# Patient Record
Sex: FEMALE | Race: WHITE | NOT HISPANIC OR LATINO | Employment: OTHER | URBAN - NONMETROPOLITAN AREA
[De-identification: names, ages, dates, MRNs, and addresses within clinical notes are randomized per-mention and may not be internally consistent; named-entity substitution may affect disease eponyms.]

---

## 2017-02-27 ENCOUNTER — OFFICE VISIT (OUTPATIENT)
Dept: PAIN MEDICINE | Facility: CLINIC | Age: 57
End: 2017-02-27

## 2017-02-27 VITALS
BODY MASS INDEX: 30.46 KG/M2 | WEIGHT: 171.9 LBS | HEIGHT: 63 IN | DIASTOLIC BLOOD PRESSURE: 92 MMHG | SYSTOLIC BLOOD PRESSURE: 162 MMHG

## 2017-02-27 DIAGNOSIS — M79.2 NEUROPATHIC PAIN: Primary | ICD-10-CM

## 2017-02-27 DIAGNOSIS — Z79.899 HIGH RISK MEDICATIONS (NOT ANTICOAGULANTS) LONG-TERM USE: ICD-10-CM

## 2017-02-27 PROCEDURE — 99213 OFFICE O/P EST LOW 20 MIN: CPT | Performed by: PAIN MEDICINE

## 2017-02-27 RX ORDER — BUPRENORPHINE 20 UG/H
1 PATCH TRANSDERMAL WEEKLY
Qty: 4 PATCH | Refills: 2 | Status: SHIPPED | OUTPATIENT
Start: 2017-02-27 | End: 2017-04-25 | Stop reason: SDUPTHER

## 2017-02-27 RX ORDER — PREGABALIN 150 MG/1
150 CAPSULE ORAL 2 TIMES DAILY
Qty: 60 CAPSULE | Refills: 2 | Status: SHIPPED | OUTPATIENT
Start: 2017-02-27 | End: 2017-05-23 | Stop reason: SDUPTHER

## 2017-02-27 NOTE — PROGRESS NOTES
Ga Mendoza is a 56 y.o. female.   1960    HPI:   Location: bilateral foot  Quality: aching and tingling  Severity: 5/10  Timing: constant  Alleviating: pain medication  Aggravating: increased activity     Missed an appt.  Has been wearing the patches longer than normal to stretch them out.   Pain is not as well controlled.      The following portions of the patient's history were reviewed by me and updated as appropriate: allergies, current medications, past family history, past medical history, past social history, past surgical history and problem list.    Past Medical History   Diagnosis Date   • Anxiety      Anxiety state      • Bipolar affective disorder, current episode depressed    • Bipolar disorder    • Drug therapy      Long-term drug therapy      • Essential hypertension    • Headache    • Hyperlipidemia    • Influenza    • Liver function test abnormality      Abnormal liver test   • Neuropathic pain    • Obesity    • Pain of breast      No evidence of cancer      • Urinary tract infectious disease        Social History     Social History   • Marital status:      Spouse name: N/A   • Number of children: N/A   • Years of education: N/A     Occupational History   • Not on file.     Social History Main Topics   • Smoking status: Current Every Day Smoker   • Smokeless tobacco: Not on file   • Alcohol use Yes      Comment: seldom   • Drug use: No   • Sexual activity: Defer     Other Topics Concern   • Not on file     Social History Narrative       Family History   Problem Relation Age of Onset   • Depression Other    • Diabetes Other    • Heart disease Other    • Hypertension Other    • Stroke Other    • Lung disease Other    • Other Other      Colon problems         Current Outpatient Prescriptions:   •  ACLIDINIUM BROMIDE IN, Inhale., Disp: , Rfl:   •  ALBUTEROL SULFATE HFA IN, Inhale., Disp: , Rfl:   •  Buprenorphine 20 MCG/HR patch weekly, Place 1 patch on the skin 1 (One) Time Per  Week., Disp: 4 patch, Rfl: 2  •  Calcium Citrate-Vitamin D (CALCIUM CITRATE + D PO), Take 1 tablet by mouth Daily., Disp: , Rfl:   •  clonazePAM (KlonoPIN) 1 MG tablet, Take 1 mg by mouth., Disp: , Rfl:   •  doxepin (SINEquan) 100 MG capsule, Take 100 mg by mouth., Disp: , Rfl:   •  Flaxseed, Linseed, (FLAXSEED OIL) 1000 MG capsule, Take  by mouth., Disp: , Rfl:   •  lamoTRIgine (LaMICtal) 200 MG tablet, Take 200 mg by mouth Daily., Disp: , Rfl:   •  magnesium oxide (MAGOX) 400 (241.3 MG) MG tablet tablet, Take 400 mg by mouth Daily., Disp: , Rfl:   •  Melatonin 10 MG capsule, Take  by mouth., Disp: , Rfl:   •  melatonin 5 MG tablet tablet, Take 5 mg by mouth., Disp: , Rfl:   •  metoprolol tartrate (LOPRESSOR) 25 MG tablet, Take 25 mg by mouth 2 (Two) Times a Day., Disp: , Rfl:   •  Milk Thistle 1000 MG capsule, Take  by mouth., Disp: , Rfl:   •  Multiple Vitamin (MULTIVITAMIN+ PO), Take 1 tablet by mouth Daily., Disp: , Rfl:   •  Omega-3 Fatty Acids (OMEGA-3 FISH OIL) 1000 MG capsule, Take 1,000 mg by mouth Daily., Disp: , Rfl:   •  pregabalin (LYRICA) 150 MG capsule, Take 1 capsule by mouth 2 (Two) Times a Day., Disp: 60 capsule, Rfl: 2  •  rosuvastatin (CRESTOR) 20 MG tablet, Take 20 mg by mouth., Disp: , Rfl:   •  topiramate (TOPAMAX) 100 MG tablet, Take 50 mg by mouth 2 (Two) Times a Day. 1/2 tablet in am and 1 tablet pm for headaches., Disp: , Rfl:   •  vitamin E 400 UNIT capsule, Take 400 Units by mouth Daily., Disp: , Rfl:     No Known Allergies      Review of Systems   Musculoskeletal:        B.foot pain       10 system review of systems was reviewed and negative except for above.    Physical Exam   Constitutional: She appears well-developed and well-nourished. No distress.   Neurological: She is alert. A sensory deficit (from toes to mid-calf) is present.   Psychiatric: She has a normal mood and affect. Her behavior is normal. Judgment normal.       Ga was seen today for pain.    Diagnoses and all  orders for this visit:    Neuropathic pain    High risk medications (not anticoagulants) long-term use    Other orders  -     Buprenorphine 20 MCG/HR patch weekly; Place 1 patch on the skin 1 (One) Time Per Week.  -     pregabalin (LYRICA) 150 MG capsule; Take 1 capsule by mouth 2 (Two) Times a Day.        Medication: Patient reports no negative side effects, Patient reports appropriate usage and storage habits, Patient's opioid provides enough reflief to be more active and perform activities of daily living with less discomfort. and Refill opioid medication as above.  Refill lyrica as above.    Interventional: none at this time    Rehab: none at this time    Behavioral: No aberrant behavior noted. JESSIKA Report #[id number]  was reviewed and is consistent with stated history    Urine drug screen Reviewed from last visit and is appropriate          This document has been electronically signed by Carlos Mchugh MD on February 27, 2017 8:54 AM

## 2017-04-25 ENCOUNTER — OFFICE VISIT (OUTPATIENT)
Dept: PAIN MEDICINE | Facility: CLINIC | Age: 57
End: 2017-04-25

## 2017-04-25 ENCOUNTER — APPOINTMENT (OUTPATIENT)
Dept: LAB | Facility: HOSPITAL | Age: 57
End: 2017-04-25

## 2017-04-25 VITALS
SYSTOLIC BLOOD PRESSURE: 120 MMHG | BODY MASS INDEX: 29.55 KG/M2 | DIASTOLIC BLOOD PRESSURE: 70 MMHG | WEIGHT: 166.8 LBS | HEIGHT: 63 IN

## 2017-04-25 DIAGNOSIS — M79.2 NEUROPATHIC PAIN: Primary | ICD-10-CM

## 2017-04-25 DIAGNOSIS — Z79.899 HIGH RISK MEDICATIONS (NOT ANTICOAGULANTS) LONG-TERM USE: ICD-10-CM

## 2017-04-25 PROCEDURE — 80307 DRUG TEST PRSMV CHEM ANLYZR: CPT | Performed by: PAIN MEDICINE

## 2017-04-25 PROCEDURE — 99214 OFFICE O/P EST MOD 30 MIN: CPT | Performed by: PAIN MEDICINE

## 2017-04-25 PROCEDURE — G0481 DRUG TEST DEF 8-14 CLASSES: HCPCS | Performed by: PAIN MEDICINE

## 2017-04-25 RX ORDER — TEMAZEPAM 7.5 MG/1
CAPSULE ORAL
Refills: 0 | COMMUNITY
Start: 2017-04-13 | End: 2018-12-07

## 2017-04-25 RX ORDER — BUPRENORPHINE 20 UG/H
1 PATCH TRANSDERMAL WEEKLY
Qty: 4 PATCH | Refills: 2 | Status: SHIPPED | OUTPATIENT
Start: 2017-04-25 | End: 2017-08-23 | Stop reason: SDUPTHER

## 2017-04-25 NOTE — PROGRESS NOTES
Ga Mendoza is a 56 y.o. female.   1960    HPI:   Location: bilateral foot  Quality: aching and tingling  Severity: 3/10  Timing: constant  Alleviating: pain medication  Aggravating: increased activity    Patient denies side effects, she reports that her opioid medication still provides significant relief and allows her to be more active.  Pt doing well on butrans patch.        The following portions of the patient's history were reviewed by me and updated as appropriate: allergies, current medications, past family history, past medical history, past social history, past surgical history and problem list.    Past Medical History:   Diagnosis Date   • Anxiety     Anxiety state      • Bipolar affective disorder, current episode depressed    • Bipolar disorder    • Drug therapy     Long-term drug therapy      • Essential hypertension    • Headache    • Hyperlipidemia    • Influenza    • Liver function test abnormality     Abnormal liver test   • Neuropathic pain    • Obesity    • Pain of breast     No evidence of cancer      • Urinary tract infectious disease        Social History     Social History   • Marital status:      Spouse name: N/A   • Number of children: N/A   • Years of education: N/A     Occupational History   • Not on file.     Social History Main Topics   • Smoking status: Current Every Day Smoker   • Smokeless tobacco: Not on file   • Alcohol use Yes      Comment: seldom   • Drug use: No   • Sexual activity: Defer     Other Topics Concern   • Not on file     Social History Narrative       Family History   Problem Relation Age of Onset   • Depression Other    • Diabetes Other    • Heart disease Other    • Hypertension Other    • Stroke Other    • Lung disease Other    • Other Other      Colon problems         Current Outpatient Prescriptions:   •  ACLIDINIUM BROMIDE IN, Inhale., Disp: , Rfl:   •  ALBUTEROL SULFATE HFA IN, Inhale., Disp: , Rfl:   •  Buprenorphine 20 MCG/HR patch  weekly, Place 1 patch on the skin 1 (One) Time Per Week., Disp: 4 patch, Rfl: 2  •  Calcium Citrate-Vitamin D (CALCIUM CITRATE + D PO), Take 1 tablet by mouth Daily., Disp: , Rfl:   •  clonazePAM (KlonoPIN) 1 MG tablet, Take 1 mg by mouth., Disp: , Rfl:   •  doxepin (SINEquan) 100 MG capsule, Take 100 mg by mouth., Disp: , Rfl:   •  Flaxseed, Linseed, (FLAXSEED OIL) 1000 MG capsule, Take  by mouth., Disp: , Rfl:   •  lamoTRIgine (LaMICtal) 200 MG tablet, Take 200 mg by mouth Daily., Disp: , Rfl:   •  magnesium oxide (MAGOX) 400 (241.3 MG) MG tablet tablet, Take 400 mg by mouth Daily., Disp: , Rfl:   •  Melatonin 10 MG capsule, Take  by mouth., Disp: , Rfl:   •  melatonin 5 MG tablet tablet, Take 5 mg by mouth., Disp: , Rfl:   •  metoprolol tartrate (LOPRESSOR) 25 MG tablet, Take 25 mg by mouth 2 (Two) Times a Day., Disp: , Rfl:   •  Milk Thistle 1000 MG capsule, Take  by mouth., Disp: , Rfl:   •  Multiple Vitamin (MULTIVITAMIN+ PO), Take 1 tablet by mouth Daily., Disp: , Rfl:   •  Omega-3 Fatty Acids (OMEGA-3 FISH OIL) 1000 MG capsule, Take 1,000 mg by mouth Daily., Disp: , Rfl:   •  pregabalin (LYRICA) 150 MG capsule, Take 1 capsule by mouth 2 (Two) Times a Day., Disp: 60 capsule, Rfl: 2  •  rosuvastatin (CRESTOR) 20 MG tablet, Take 20 mg by mouth., Disp: , Rfl:   •  topiramate (TOPAMAX) 100 MG tablet, Take 50 mg by mouth 2 (Two) Times a Day. 1/2 tablet in am and 1 tablet pm for headaches., Disp: , Rfl:   •  vitamin E 400 UNIT capsule, Take 400 Units by mouth Daily., Disp: , Rfl:   •  temazepam (RESTORIL) 7.5 MG capsule, TK 1 C PO QHS PRF SLEEP, Disp: , Rfl: 0    No Known Allergies      Review of Systems   Musculoskeletal:        B.foot pain       10 system review of systems was reviewed and negative except for above.    Physical Exam   Constitutional: She appears well-developed and well-nourished. No distress.   Neurological: She is alert. A sensory deficit (from toes to mid-calf) is present.   Psychiatric: She  has a normal mood and affect. Her behavior is normal. Judgment normal.       Ga was seen today for pain.    Diagnoses and all orders for this visit:    Neuropathic pain  -     ToxASSURE Select 13 (MW)    High risk medications (not anticoagulants) long-term use  -     ToxASSURE Select 13 (MW)    Other orders  -     Buprenorphine 20 MCG/HR patch weekly; Place 1 patch on the skin 1 (One) Time Per Week.        Medication: Patient reports no negative side effects, Patient reports appropriate usage and storage habits, Patient's opioid provides enough reflief to be more active and perform activities of daily living with less discomfort. and Refill opioid medication as above.    Interventional: none at this time    Rehab: none at this time    Behavioral: No aberrant behavior noted. JESSIKA Report #[id number]  was reviewed and is consistent with stated history    Urine drug screen Ordered today to test for drugs of abuse and prescribed medications          This document has been electronically signed by Carlos Mchugh MD on April 25, 2017 4:35 PM

## 2017-04-30 LAB
CONV REPORT SUMMARY: NORMAL
Lab: NORMAL

## 2017-05-22 ENCOUNTER — TELEPHONE (OUTPATIENT)
Dept: PAIN MEDICINE | Facility: CLINIC | Age: 57
End: 2017-05-22

## 2017-05-23 RX ORDER — PREGABALIN 150 MG/1
150 CAPSULE ORAL 2 TIMES DAILY
Qty: 60 CAPSULE | Refills: 2 | OUTPATIENT
Start: 2017-05-23 | End: 2017-08-23 | Stop reason: SDUPTHER

## 2017-08-10 NOTE — TELEPHONE ENCOUNTER
Pt called in stating that she would be out of lyrica before her next appt. MD notified and approved the calling in of enough to get her to her next appt date. Pt will run out 6 days prior to appt therefore lyrica 150mg bid qty 12 with no refilss called into [facility] pharmacy # [id number]. Pt aware.

## 2017-08-23 ENCOUNTER — OFFICE VISIT (OUTPATIENT)
Dept: PAIN MEDICINE | Facility: CLINIC | Age: 57
End: 2017-08-23

## 2017-08-23 VITALS
SYSTOLIC BLOOD PRESSURE: 124 MMHG | WEIGHT: 160.7 LBS | HEIGHT: 63 IN | BODY MASS INDEX: 28.47 KG/M2 | DIASTOLIC BLOOD PRESSURE: 78 MMHG

## 2017-08-23 DIAGNOSIS — M79.2 NEUROPATHIC PAIN: Primary | ICD-10-CM

## 2017-08-23 DIAGNOSIS — Z79.899 HIGH RISK MEDICATIONS (NOT ANTICOAGULANTS) LONG-TERM USE: ICD-10-CM

## 2017-08-23 PROCEDURE — 99214 OFFICE O/P EST MOD 30 MIN: CPT | Performed by: PAIN MEDICINE

## 2017-08-23 RX ORDER — PREGABALIN 150 MG/1
150 CAPSULE ORAL 2 TIMES DAILY
Qty: 60 CAPSULE | Refills: 4 | OUTPATIENT
Start: 2017-08-23 | End: 2017-08-23 | Stop reason: SDUPTHER

## 2017-08-23 RX ORDER — BUPRENORPHINE 20 UG/H
1 PATCH TRANSDERMAL WEEKLY
Qty: 4 PATCH | Refills: 4 | Status: SHIPPED | OUTPATIENT
Start: 2017-08-23 | End: 2022-09-30

## 2017-08-23 RX ORDER — PREGABALIN 150 MG/1
150 CAPSULE ORAL 2 TIMES DAILY
Qty: 60 CAPSULE | Refills: 4 | Status: SHIPPED | OUTPATIENT
Start: 2017-08-23 | End: 2020-04-24 | Stop reason: SDUPTHER

## 2017-08-23 NOTE — PROGRESS NOTES
Ga Mendoza is a 56 y.o. female.   1960    HPI:   Location: bilateral foot  Quality: aching and tingling  Severity: 3/10  Timing: constant  Alleviating: pain medication  Aggravating: increased activity    Patient reports that the opioid medication still provides her good relief and allows increased activity than she would have without the opioid medication.  She denies side effects.      The following portions of the patient's history were reviewed by me and updated as appropriate: allergies, current medications, past family history, past medical history, past social history, past surgical history and problem list.    Past Medical History:   Diagnosis Date   • Anxiety     Anxiety state      • Bipolar affective disorder, current episode depressed    • Bipolar disorder    • Drug therapy     Long-term drug therapy      • Essential hypertension    • Headache    • Hyperlipidemia    • Influenza    • Liver function test abnormality     Abnormal liver test   • Neuropathic pain    • Obesity    • Pain of breast     No evidence of cancer      • Urinary tract infectious disease        Social History     Social History   • Marital status:      Spouse name: N/A   • Number of children: N/A   • Years of education: N/A     Occupational History   • Not on file.     Social History Main Topics   • Smoking status: Current Every Day Smoker   • Smokeless tobacco: Never Used   • Alcohol use Yes      Comment: seldom   • Drug use: No   • Sexual activity: Defer     Other Topics Concern   • Not on file     Social History Narrative       Family History   Problem Relation Age of Onset   • Depression Other    • Diabetes Other    • Heart disease Other    • Hypertension Other    • Stroke Other    • Lung disease Other    • Other Other      Colon problems         Current Outpatient Prescriptions:   •  ACLIDINIUM BROMIDE IN, Inhale., Disp: , Rfl:   •  ALBUTEROL SULFATE HFA IN, Inhale., Disp: , Rfl:   •  Buprenorphine 20 MCG/HR  patch weekly, Place 1 patch on the skin 1 (One) Time Per Week., Disp: 4 patch, Rfl: 4  •  Calcium Citrate-Vitamin D (CALCIUM CITRATE + D PO), Take 1 tablet by mouth Daily., Disp: , Rfl:   •  clonazePAM (KlonoPIN) 1 MG tablet, Take 1 mg by mouth., Disp: , Rfl:   •  doxepin (SINEquan) 100 MG capsule, Take 100 mg by mouth., Disp: , Rfl:   •  Flaxseed, Linseed, (FLAXSEED OIL) 1000 MG capsule, Take  by mouth., Disp: , Rfl:   •  lamoTRIgine (LaMICtal) 200 MG tablet, Take 200 mg by mouth Daily., Disp: , Rfl:   •  magnesium oxide (MAGOX) 400 (241.3 MG) MG tablet tablet, Take 400 mg by mouth Daily., Disp: , Rfl:   •  Melatonin 10 MG capsule, Take  by mouth., Disp: , Rfl:   •  melatonin 5 MG tablet tablet, Take 5 mg by mouth., Disp: , Rfl:   •  metoprolol tartrate (LOPRESSOR) 25 MG tablet, Take 25 mg by mouth 2 (Two) Times a Day., Disp: , Rfl:   •  Milk Thistle 1000 MG capsule, Take  by mouth., Disp: , Rfl:   •  Multiple Vitamin (MULTIVITAMIN+ PO), Take 1 tablet by mouth Daily., Disp: , Rfl:   •  Omega-3 Fatty Acids (OMEGA-3 FISH OIL) 1000 MG capsule, Take 1,000 mg by mouth Daily., Disp: , Rfl:   •  pregabalin (LYRICA) 150 MG capsule, Take 1 capsule by mouth 2 (Two) Times a Day., Disp: 60 capsule, Rfl: 4  •  rosuvastatin (CRESTOR) 20 MG tablet, Take 20 mg by mouth., Disp: , Rfl:   •  temazepam (RESTORIL) 7.5 MG capsule, TK 1 C PO QHS PRF SLEEP, Disp: , Rfl: 0  •  topiramate (TOPAMAX) 100 MG tablet, Take 50 mg by mouth 2 (Two) Times a Day. 1/2 tablet in am and 1 tablet pm for headaches., Disp: , Rfl:   •  vitamin E 400 UNIT capsule, Take 400 Units by mouth Daily., Disp: , Rfl:     No Known Allergies    Review of Systems   Musculoskeletal:        B.foot     All other systems reviewed and are negative.    All systems reviewed and negative except for above.    Physical Exam   Constitutional: She appears well-developed and well-nourished. No distress.   Neurological: She is alert. A sensory deficit (from toes to mid-calf) is  present.   Psychiatric: She has a normal mood and affect. Her behavior is normal. Judgment normal.       Ga was seen today for pain.    Diagnoses and all orders for this visit:    Neuropathic pain    High risk medications (not anticoagulants) long-term use    Other orders  -     Buprenorphine 20 MCG/HR patch weekly; Place 1 patch on the skin 1 (One) Time Per Week.  -     pregabalin (LYRICA) 150 MG capsule; Take 1 capsule by mouth 2 (Two) Times a Day.        Medication: Patient reports no negative side effects, Patient reports appropriate usage and storage habits, Patient's opioid provides enough reflief to be more active and perform activities of daily living with less discomfort. and Refill opioid medication as above.  Discussed new cdc guidelines regarding prescription of benzo and opioids simultaneously.  Pt states she will discuss with her pcp before next visit.     Interventional: none at this time    Rehab: none at this time    Behavioral: No aberrant behavior noted. JESSIKA Report #[id number] was reviewed and is consistent with stated history    Urine drug screen Reviewed from last visit and is appropriate          This document has been electronically signed by Carlos Mchugh MD on August 23, 2017 2:23 PM

## 2018-12-07 ENCOUNTER — OFFICE VISIT (OUTPATIENT)
Dept: FAMILY MEDICINE CLINIC | Facility: CLINIC | Age: 58
End: 2018-12-07

## 2018-12-07 VITALS
HEIGHT: 63 IN | TEMPERATURE: 98.4 F | RESPIRATION RATE: 18 BRPM | BODY MASS INDEX: 25.82 KG/M2 | WEIGHT: 145.7 LBS | DIASTOLIC BLOOD PRESSURE: 74 MMHG | SYSTOLIC BLOOD PRESSURE: 110 MMHG | HEART RATE: 62 BPM | OXYGEN SATURATION: 98 %

## 2018-12-07 DIAGNOSIS — Z76.89 ENCOUNTER TO ESTABLISH CARE: Primary | ICD-10-CM

## 2018-12-07 DIAGNOSIS — R05.9 COUGH: ICD-10-CM

## 2018-12-07 DIAGNOSIS — K21.9 GASTROESOPHAGEAL REFLUX DISEASE, ESOPHAGITIS PRESENCE NOT SPECIFIED: ICD-10-CM

## 2018-12-07 DIAGNOSIS — I10 HYPERTENSION, UNSPECIFIED TYPE: ICD-10-CM

## 2018-12-07 DIAGNOSIS — G47.00 INSOMNIA, UNSPECIFIED TYPE: ICD-10-CM

## 2018-12-07 DIAGNOSIS — F17.200 SMOKER: ICD-10-CM

## 2018-12-07 DIAGNOSIS — G43.909 MIGRAINE WITHOUT STATUS MIGRAINOSUS, NOT INTRACTABLE, UNSPECIFIED MIGRAINE TYPE: ICD-10-CM

## 2018-12-07 DIAGNOSIS — Z00.00 ENCOUNTER FOR PREVENTATIVE ADULT HEALTH CARE EXAMINATION: ICD-10-CM

## 2018-12-07 DIAGNOSIS — J01.00 ACUTE NON-RECURRENT MAXILLARY SINUSITIS: ICD-10-CM

## 2018-12-07 DIAGNOSIS — F31.9 BIPOLAR DEPRESSION (HCC): ICD-10-CM

## 2018-12-07 PROCEDURE — 99214 OFFICE O/P EST MOD 30 MIN: CPT | Performed by: NURSE PRACTITIONER

## 2018-12-07 RX ORDER — FAMOTIDINE 40 MG/1
40 TABLET, FILM COATED ORAL DAILY
COMMUNITY
End: 2018-12-07 | Stop reason: SDUPTHER

## 2018-12-07 RX ORDER — PREGABALIN 150 MG/1
150 CAPSULE ORAL 2 TIMES DAILY
COMMUNITY
Start: 2018-12-24 | End: 2018-12-07 | Stop reason: SDUPTHER

## 2018-12-07 RX ORDER — CHOLECALCIFEROL (VITAMIN D3) 1250 MCG
CAPSULE ORAL
COMMUNITY
End: 2018-12-07 | Stop reason: SDUPTHER

## 2018-12-07 RX ORDER — BIOTIN 1 MG
1000 TABLET ORAL
COMMUNITY

## 2018-12-07 RX ORDER — ALBUTEROL SULFATE 1.25 MG/3ML
1 SOLUTION RESPIRATORY (INHALATION) EVERY 6 HOURS PRN
Qty: 30 VIAL | Refills: 2 | Status: SHIPPED | OUTPATIENT
Start: 2018-12-07 | End: 2020-04-06 | Stop reason: SDUPTHER

## 2018-12-07 RX ORDER — TRAZODONE HYDROCHLORIDE 150 MG/1
150 TABLET ORAL NIGHTLY PRN
Qty: 90 TABLET | Refills: 2 | Status: SHIPPED | OUTPATIENT
Start: 2018-12-07 | End: 2019-06-04 | Stop reason: ALTCHOICE

## 2018-12-07 RX ORDER — AZELASTINE 1 MG/ML
2 SPRAY, METERED NASAL AS NEEDED
COMMUNITY

## 2018-12-07 RX ORDER — TOPIRAMATE 50 MG/1
50 TABLET, FILM COATED ORAL DAILY
Qty: 90 TABLET | Refills: 2 | Status: SHIPPED | OUTPATIENT
Start: 2018-12-07 | End: 2019-06-07

## 2018-12-07 RX ORDER — CEFUROXIME AXETIL 500 MG/1
500 TABLET ORAL 2 TIMES DAILY
Qty: 20 TABLET | Refills: 0 | Status: SHIPPED | OUTPATIENT
Start: 2018-12-07 | End: 2018-12-17

## 2018-12-07 RX ORDER — PHENTERMINE HYDROCHLORIDE 37.5 MG/1
37.5 CAPSULE ORAL
COMMUNITY
End: 2018-12-07

## 2018-12-07 RX ORDER — CHOLECALCIFEROL (VITAMIN D3) 1250 MCG
50000 CAPSULE ORAL
Qty: 12 CAPSULE | Refills: 3 | Status: SHIPPED | OUTPATIENT
Start: 2018-12-07 | End: 2019-02-11

## 2018-12-07 RX ORDER — ERGOCALCIFEROL 1.25 MG/1
50000 CAPSULE ORAL
COMMUNITY
End: 2018-12-07 | Stop reason: SDUPTHER

## 2018-12-07 RX ORDER — HYDROXYZINE PAMOATE 25 MG/1
25 CAPSULE ORAL
COMMUNITY
End: 2019-01-23 | Stop reason: SDUPTHER

## 2018-12-07 RX ORDER — ALBUTEROL SULFATE 90 UG/1
2 AEROSOL, METERED RESPIRATORY (INHALATION) EVERY 4 HOURS PRN
Qty: 1 INHALER | Refills: 3 | Status: SHIPPED | OUTPATIENT
Start: 2018-12-07 | End: 2020-04-06 | Stop reason: SDUPTHER

## 2018-12-07 RX ORDER — TOPIRAMATE 50 MG/1
50 TABLET, FILM COATED ORAL DAILY
COMMUNITY
End: 2018-12-07 | Stop reason: SDUPTHER

## 2018-12-07 RX ORDER — FAMOTIDINE 40 MG/1
40 TABLET, FILM COATED ORAL DAILY
Qty: 90 TABLET | Refills: 3 | Status: SHIPPED | OUTPATIENT
Start: 2018-12-07 | End: 2019-12-14 | Stop reason: SDUPTHER

## 2018-12-07 RX ORDER — LORATADINE 10 MG/1
10 TABLET ORAL
COMMUNITY
End: 2022-04-15 | Stop reason: ALTCHOICE

## 2018-12-07 RX ORDER — BUSPIRONE HYDROCHLORIDE 7.5 MG/1
7.5 TABLET ORAL
COMMUNITY
End: 2019-07-08 | Stop reason: SDUPTHER

## 2018-12-07 RX ORDER — LISINOPRIL 40 MG/1
40 TABLET ORAL
COMMUNITY
End: 2019-06-07 | Stop reason: SDUPTHER

## 2018-12-07 RX ORDER — ESOMEPRAZOLE MAGNESIUM 40 MG/1
40 CAPSULE, DELAYED RELEASE ORAL
COMMUNITY
End: 2018-12-07

## 2018-12-07 RX ORDER — CYCLOBENZAPRINE HCL 5 MG
5 TABLET ORAL
COMMUNITY
Start: 2018-12-03 | End: 2019-02-02

## 2018-12-07 RX ORDER — NIACIN 500 MG/1
TABLET ORAL
COMMUNITY

## 2018-12-07 RX ORDER — MULTIVIT WITH MINERALS/LUTEIN
500 TABLET ORAL
COMMUNITY
End: 2019-06-05 | Stop reason: SDUPTHER

## 2018-12-07 RX ORDER — TRAZODONE HYDROCHLORIDE 150 MG/1
150 TABLET ORAL
COMMUNITY
End: 2018-12-07 | Stop reason: SDUPTHER

## 2018-12-07 NOTE — PROGRESS NOTES
Subjective   Ga Mendoza is a 58 y.o. female.     Ms Mendoza is a 58-year-old female who presents today to establish care for the management of anxiety, depression, hypertension, GERD, insomnia, hyperlipidemia, migraine. Patient sees Dr. Eliu Marquez for chronic pain management. Acutely, patient states she is having sinus congestion, cough, green nasal discharge times 1 half weeks. Symptoms started out mild with clear nasal drainage and a mild scratchy throat. However, symptoms have progressively worsened into increased sinus pressure, colored nasal discharge and increased cough. Patient denies fevers, chills, nausea, vomiting, diarrhea.         The following portions of the patient's history were reviewed and updated as appropriate: allergies, current medications, past family history, past medical history, past social history, past surgical history and problem list.    Review of Systems   Constitutional: Negative for activity change, appetite change, chills, diaphoresis, fatigue, fever, unexpected weight gain and unexpected weight loss.   HENT: Positive for congestion, ear pain, rhinorrhea, sinus pressure and sore throat. Negative for ear discharge, trouble swallowing and voice change.    Eyes: Negative for blurred vision, double vision, photophobia, pain and visual disturbance.   Respiratory: Positive for cough. Negative for chest tightness, shortness of breath and wheezing.    Cardiovascular: Negative for chest pain, palpitations and leg swelling.   Gastrointestinal: Negative for abdominal distention, abdominal pain, anal bleeding, blood in stool, constipation, diarrhea, nausea, vomiting, GERD and indigestion.   Endocrine: Negative for cold intolerance, heat intolerance, polydipsia, polyphagia and polyuria.   Genitourinary: Negative for dysuria, frequency, hematuria and urgency.   Musculoskeletal: Negative for arthralgias and myalgias.   Skin: Negative for rash.   Allergic/Immunologic: Negative.     Neurological: Negative for dizziness, syncope, weakness, light-headedness and headache.   Hematological: Negative.    Psychiatric/Behavioral: The patient is not nervous/anxious.        Objective   Physical Exam   Constitutional: She is oriented to person, place, and time. She appears well-developed and well-nourished. No distress.   HENT:   Head: Normocephalic and atraumatic.   Right Ear: External ear normal.   Left Ear: External ear normal.   Nose: Mucosal edema, rhinorrhea and congestion present. Right sinus exhibits maxillary sinus tenderness. Left sinus exhibits maxillary sinus tenderness.   Mouth/Throat: Oropharynx is clear and moist.   Eyes: Conjunctivae and EOM are normal. Pupils are equal, round, and reactive to light.   Neck: Normal range of motion. Neck supple. No tracheal deviation present. No thyromegaly present.   Cardiovascular: Normal rate, regular rhythm, normal heart sounds and intact distal pulses. Exam reveals no gallop and no friction rub.   No murmur heard.  Pulmonary/Chest: Effort normal and breath sounds normal. No stridor. No respiratory distress. She has no wheezes. She has no rales.   Abdominal: Soft. Bowel sounds are normal. She exhibits no distension and no mass. There is no tenderness. There is no rebound and no guarding. No hernia.   Musculoskeletal: Normal range of motion. She exhibits no edema or tenderness.   Lymphadenopathy:     She has no cervical adenopathy.   Neurological: She is alert and oriented to person, place, and time. No cranial nerve deficit. Coordination normal.   Skin: Skin is warm and dry. No rash noted. She is not diaphoretic. No erythema. No pallor.   Psychiatric: She has a normal mood and affect. Her behavior is normal. Judgment and thought content normal.   Nursing note and vitals reviewed.        Assessment/Plan   Ga was seen today for establish care, cough and med refill.    Diagnoses and all orders for this visit:    Encounter to establish  care    Encounter for preventative adult health care examination  -     Hemoglobin A1c; Future  -     Comprehensive Metabolic Panel; Future  -     CBC & Differential; Future  -     TSH; Future  -     Lipid Panel; Future  -     Ambulatory Referral to Gastroenterology  -     Mammo Screening Digital Tomosynthesis Bilateral With CAD    Hypertension, unspecified type    Insomnia, unspecified type    Smoker    Cough    Gastroesophageal reflux disease, esophagitis presence not specified    Bipolar depression (CMS/HCC)    Migraine without status migrainosus, not intractable, unspecified migraine type    Acute non-recurrent maxillary sinusitis    Other orders  -     traZODone (DESYREL) 150 MG tablet; Take 1 tablet by mouth At Night As Needed for Sleep.  -     Cholecalciferol (VITAMIN D3) 14340 units capsule; Take 1 capsule by mouth Every 7 (Seven) Days.  -     topiramate (TOPAMAX) 50 MG tablet; Take 1 tablet by mouth Daily.  -     albuterol (PROAIR HFA) 108 (90 Base) MCG/ACT inhaler; Inhale 2 puffs Every 4 (Four) Hours As Needed for Wheezing or Shortness of Air.  -     albuterol (ACCUNEB) 1.25 MG/3ML nebulizer solution; Take 3 mL by nebulization Every 6 (Six) Hours As Needed for Wheezing or Shortness of Air.  -     famotidine (PEPCID) 40 MG tablet; Take 1 tablet by mouth Daily.  -     cefuroxime (CEFTIN) 500 MG tablet; Take 1 tablet by mouth 2 (Two) Times a Day for 10 days.    1. Maxillary sinusitis, cough- ceftin 500 mg po twice daily x10 days.     2. Hypertension- controlled. No change in therapy.     3. Insomnia- refill trazodone 150mg po nightly.    4. GERD-Stable. pepcid 40 mg po daily.     5. Bipolar depression-stable, controlled.     6. Migraine- refill topamax 50 mg po daily.     7. Health Maintenance- routine labs ordered. Will call with results. Referral for screening mammogram and colonoscopy.     8. Follow up in 3 months or sooner if needed.             This document has been electronically signed by Nikunj MOISE  ROSEMARY Lipscomb on December 8, 2018 8:35 AM

## 2018-12-14 ENCOUNTER — OFFICE VISIT (OUTPATIENT)
Dept: GASTROENTEROLOGY | Facility: CLINIC | Age: 58
End: 2018-12-14

## 2018-12-14 VITALS
DIASTOLIC BLOOD PRESSURE: 92 MMHG | OXYGEN SATURATION: 98 % | WEIGHT: 147.2 LBS | SYSTOLIC BLOOD PRESSURE: 134 MMHG | BODY MASS INDEX: 26.08 KG/M2 | HEIGHT: 63 IN | HEART RATE: 67 BPM

## 2018-12-14 DIAGNOSIS — K59.00 CONSTIPATION, UNSPECIFIED CONSTIPATION TYPE: ICD-10-CM

## 2018-12-14 DIAGNOSIS — Z12.11 ENCOUNTER FOR SCREENING FOR MALIGNANT NEOPLASM OF COLON: Primary | ICD-10-CM

## 2018-12-14 PROCEDURE — S0260 H&P FOR SURGERY: HCPCS | Performed by: INTERNAL MEDICINE

## 2018-12-14 RX ORDER — PEG-3350, SODIUM SULFATE, SODIUM CHLORIDE, POTASSIUM CHLORIDE, SODIUM ASCORBATE AND ASCORBIC ACID 7.5-2.691G
2000 KIT ORAL ONCE
Qty: 1 EACH | Refills: 0 | Status: SHIPPED | OUTPATIENT
Start: 2018-12-14 | End: 2018-12-14

## 2018-12-14 RX ORDER — DEXTROSE AND SODIUM CHLORIDE 5; .45 G/100ML; G/100ML
30 INJECTION, SOLUTION INTRAVENOUS CONTINUOUS PRN
Status: CANCELLED | OUTPATIENT
Start: 2019-02-14

## 2018-12-14 NOTE — PATIENT INSTRUCTIONS

## 2018-12-14 NOTE — PROGRESS NOTES
"[facility]      Chief Complaint:   Chief Complaint   Patient presents with   • Colon Cancer Screening       Subjective     HPI:   Colon cancer screening.  Patient denies complaint. No abdominal pain or rectal bleeding. Patient says that she is trying to lose weight intentionally. Denies family history of colon cancer.    Constipation.  Reports history of constipation for which she takes a \"prescribed medicine\" she does not remember the name, but she says the medicine helps to keep her bowels regular.    PLAN:  Colonoscopy. Extended bowel prep.     The risks, benefits, and alternatives of this procedure have been discussed with the patient. The risks including but not limited to bleeding which may require blood transfusion, perforation which may require surgical management, infection, aspiration, or anesthesia related complication. The patient verbalized understanding. All the patient's questions were answered. The patient agrees to proceed.      Past History:   Past Medical History:   Diagnosis Date   • Anxiety     Anxiety state      • Bipolar affective disorder, current episode depressed (CMS/HCC)    • Bipolar disorder (CMS/HCC)    • Breast cyst    • Drug therapy     Long-term drug therapy      • Essential hypertension    • Headache    • Hyperlipidemia    • Influenza    • Liver function test abnormality     Abnormal liver test   • Neuropathic pain    • Obesity    • Pain of breast     No evidence of cancer      • Urinary tract infectious disease      Past Surgical History:   Procedure Laterality Date   • APPENDECTOMY      Appendectomy (1)      • BREAST BIOPSY     • DIAGNOSTIC LAPAROSCOPY  03/04/2003    Laparosc diagnostic (1)      • ENDOSCOPY W/ PEG TUBE PLACEMENT  03/21/2003    EGD w/ tube 56316 (1)      • LAPAROSCOPY ESOPHAGOGASTRIC FUNDOPLASTY HYBRID  02/10/2005    Fundoplasty, laparoscopic (1)      • PAP SMEAR  08/18/2009    PAP SMEAR (1)      • TOTAL ABDOMINAL HYSTERECTOMY WITH SALPINGO " OOPHORECTOMY  03/25/2003    JOSE/BSO (1)          Family History:  Family History   Problem Relation Age of Onset   • Depression Other    • Diabetes Other    • Heart disease Other    • Hypertension Other    • Stroke Other    • Lung disease Other    • Other Other         Colon problems   • COPD Mother    • Heart disease Father    • Lung disease Father    • Hypertension Sister    • Hypertension Brother    • Fibromyalgia Daughter    • Anxiety disorder Son    • Diabetes Brother    • No Known Problems Daughter        Social History:   reports that she has been smoking.  she has never used smokeless tobacco. She reports that she drinks alcohol. She reports that she does not use drugs.    Medications:   Prior to Admission medications    Medication Sig Start Date End Date Taking? Authorizing Provider   albuterol (ACCUNEB) 1.25 MG/3ML nebulizer solution Take 3 mL by nebulization Every 6 (Six) Hours As Needed for Wheezing or Shortness of Air. 12/7/18  Yes Nikunj Lipscomb APRN   albuterol (PROAIR HFA) 108 (90 Base) MCG/ACT inhaler Inhale 2 puffs Every 4 (Four) Hours As Needed for Wheezing or Shortness of Air. 12/7/18  Yes Nikunj Lipscomb APRN   aspirin 81 MG tablet Take 81 mg by mouth.   Yes Gilma Cruz MD   azelastine (ASTELIN) 0.1 % nasal spray 2 sprays into the nostril(s) as directed by provider As Needed for Rhinitis. Use in each nostril as directed   Yes Gilma Cruz MD   Biotin 1000 MCG tablet Take 1,000 mcg by mouth.   Yes Gilma Cruz MD   Biotin w/ Vitamins C & E (HAIR/SKIN/NAILS PO) Take  by mouth.   Yes Gilma Cruz MD   Buprenorphine 20 MCG/HR patch weekly Place 1 patch on the skin 1 (One) Time Per Week. 8/23/17  Yes Carlos Mchugh MD   busPIRone (BUSPAR) 7.5 MG tablet Take 7.5 mg by mouth.   Yes Gilma Cruz MD   Calcium Citrate-Vitamin D (CALCIUM CITRATE + D PO) Take 1 tablet by mouth Daily.   Yes Gilma Cruz MD   cefuroxime (CEFTIN) 500 MG tablet  Take 1 tablet by mouth 2 (Two) Times a Day for 10 days. 12/7/18 12/17/18 Yes Nikunj Lipscomb APRN   Cholecalciferol (VITAMIN D3) 59600 units capsule Take 1 capsule by mouth Every 7 (Seven) Days. 12/7/18  Yes Nikunj Lipscomb APRN   COLLAGEN PO Take 1,000 mg by mouth Daily.   Yes Gilma Cruz MD   cyclobenzaprine (FLEXERIL) 5 MG tablet Take 5 mg by mouth. 12/3/18 2/2/19 Yes Gilma Cruz MD   famotidine (PEPCID) 40 MG tablet Take 1 tablet by mouth Daily. 12/7/18  Yes Nikunj Lipscomb APRN   hydrOXYzine (VISTARIL) 25 MG capsule Take 25 mg by mouth.   Yes ProviderGilma MD   lamoTRIgine (LaMICtal) 200 MG tablet Take 200 mg by mouth Daily.   Yes ProviderGilma MD   lisinopril (PRINIVIL,ZESTRIL) 40 MG tablet Take 40 mg by mouth.   Yes ProviderGilma MD   loratadine (CLARITIN) 10 MG tablet Take 10 mg by mouth.   Yes Provider, MD Gilma   magnesium oxide (MAGOX) 400 (241.3 MG) MG tablet tablet Take 400 mg by mouth Daily.   Yes ProviderGilma MD   Melatonin 10 MG capsule Take  by mouth.   Yes ProviderGilma MD   melatonin 5 MG tablet tablet Take 5 mg by mouth.   Yes ProviderGilma MD   metoprolol tartrate (LOPRESSOR) 25 MG tablet Take 25 mg by mouth 2 (Two) Times a Day.   Yes ProviderGilma MD   Milk Thistle 1000 MG capsule Take  by mouth.   Yes ProviderGilma MD   Multiple Vitamin (MULTIVITAMIN+ PO) Take 1 tablet by mouth Daily.   Yes ProviderGilma MD   Naloxegol Oxalate (MOVANTIK) 25 MG tablet Take 25 mg by mouth. 8/20/18  Yes Gilma Cruz MD   Niacin, Antihyperlipidemic, 500 MG tablet Take  by mouth.   Yes ProviderGilma MD   Omega-3 Fatty Acids (OMEGA-3 FISH OIL) 1000 MG capsule Take 1,000 mg by mouth Daily.   Yes Gilma Cruz MD   pregabalin (LYRICA) 150 MG capsule Take 1 capsule by mouth 2 (Two) Times a Day. 8/23/17  Yes Newcom, Carlos C., MD   rosuvastatin (CRESTOR) 20 MG tablet Take 20 mg by mouth.    Yes Gilma Cruz MD   topiramate (TOPAMAX) 100 MG tablet Take 50 mg by mouth 2 (Two) Times a Day. 1/2 tablet in am and 1 tablet pm for headaches.   Yes Gilma Cruz MD   topiramate (TOPAMAX) 50 MG tablet Take 1 tablet by mouth Daily. 12/7/18  Yes Nikunj Lipscomb APRN   traZODone (DESYREL) 150 MG tablet Take 1 tablet by mouth At Night As Needed for Sleep. 12/7/18  Yes Nikunj Lipscomb APRN   vitamin A 51644 UNIT capsule Take 10,000 Units by mouth Daily.   Yes Gilma Cruz MD   vitamin C (ASCORBIC ACID) 250 MG tablet Take 500 mg by mouth.   Yes Gilma Cruz MD   vitamin E 400 UNIT capsule Take 400 Units by mouth Daily.   Yes Gilma Cruz MD   Vortioxetine HBr (TRINTELLIX) 20 MG tablet Take 20 mg by mouth Daily.   Yes Gilma Cruz MD   PEG-KCl-NaCl-NaSulf-Na Asc-C (MOVIPREP) 100 g reconstituted solution powder Take 2,000 mL by mouth 1 (One) Time for 1 dose. Take as per instruction sheet for colonoscopy prep. 12/14/18 12/14/18  Jaiyeola, Aderemi, MD       Allergies:  Patient has no known allergies.    ROS:    Review of Systems   Constitutional: Negative for chills, diaphoresis and fever.   HENT: Negative for ear discharge, ear pain and tinnitus.    Eyes: Negative for pain and discharge.   Respiratory: Negative for apnea and choking.    Cardiovascular: Negative for chest pain, palpitations and leg swelling.   Gastrointestinal: Positive for constipation. Negative for abdominal distention, abdominal pain, anal bleeding, blood in stool, diarrhea, nausea, rectal pain and vomiting.   Endocrine: Negative for heat intolerance, polydipsia and polyuria.   Genitourinary: Negative for dysuria and hematuria.   Musculoskeletal: Negative for joint swelling and neck stiffness.   Skin: Negative for color change and pallor.   Neurological: Negative for seizures and headaches.   Hematological: Negative for adenopathy. Does not bruise/bleed easily.   Psychiatric/Behavioral:  "Negative for hallucinations and suicidal ideas.     Objective     Blood pressure 134/92, pulse 67, height 160 cm (63\"), weight 66.8 kg (147 lb 3.2 oz), SpO2 98 %.    Physical Exam   Constitutional: She is oriented to person, place, and time. She appears well-developed.   HENT:   Head: Normocephalic.   Right Ear: External ear normal.   Left Ear: External ear normal.   Eyes: Conjunctivae are normal. Right eye exhibits no discharge. Left eye exhibits no discharge. No scleral icterus.   Neck: Neck supple. No thyromegaly present.   Cardiovascular: Normal rate and regular rhythm.   Pulmonary/Chest: Effort normal. She has no wheezes.   Abdominal: Soft. She exhibits no distension and no mass. There is no tenderness. There is no rebound and no guarding.   Musculoskeletal: She exhibits no edema or deformity.   Neurological: She is alert and oriented to person, place, and time.   Skin: Skin is warm.   Psychiatric: Her behavior is normal.        Laboratory Data:   No results for input(s): GLUCOSE, BUN, CREATININE, NA, K, CL, CO2, CALCIUM, PROTEINTOT, ALBUMIN, ALT, AST, ALKPHOS, BILITOT, EGFRIFNONA, GLOB, AGRATIO, BCR, ANIONGAP, BILIDIR, INDBILI in the last 94353 hours.  No results for input(s): WBC, RBC, HGB, HCT, MCV, MCH, MCHC, RDW, RDWSD, MPV, PLT in the last 60985 hours.  No results for input(s): INR in the last 20264 hours.  No results for input(s): CRP in the last 53203 hours.  No results for input(s): LIPASE in the last 29187 hours.  No results for input(s): AMYLASE in the last 81199 hours.    No radiology results for the last 30 days.    Assessment/Plan   Ga was seen today for colon cancer screening.    Diagnoses and all orders for this visit:    Encounter for screening for malignant neoplasm of colon  -     Case Request; Standing  -     dextrose 5 % and sodium chloride 0.45 % infusion; Infuse 30 mL/hr into a venous catheter Continuous As Needed (Start Prior to Procedure).  -     Case Request  -     " PEG-KCl-NaCl-NaSulf-Na Asc-C (MOVIPREP) 100 g reconstituted solution powder; Take 2,000 mL by mouth 1 (One) Time for 1 dose. Take as per instruction sheet for colonoscopy prep.    Other orders  -     Implement Anesthesia Orders Day of Procedure; Standing  -     Obtain Informed Consent; Standing  -     Verify Bowel Prep Was Successful; Standing  -     Give Tap Water Enema If Bowel Prep Insufficient; Standing  -     Hold Medication patient not on anticoagulation; Standing  -     POC Glucose Once; Standing        COLONOSCOPY (N/A)     Diagnosis Plan   1. Encounter for screening for malignant neoplasm of colon  Case Request    dextrose 5 % and sodium chloride 0.45 % infusion    Case Request    PEG-KCl-NaCl-NaSulf-Na Asc-C (MOVIPREP) 100 g reconstituted solution powder       Anticipated Surgical Procedure:  No orders of the defined types were placed in this encounter.          I discussed the assessment and recommendations with the patient. She verbalized understanding. All questions were answered. The patient denies any further question.    Aderemi Jaiyeola, MD  12/14/18  2:46 PM    EMR Dragon/Transcription disclaimer:   Some of this note may be an electronic transcription/translation of spoken language to printed text. The electronic translation of spoken language may permit erroneous, or at times, nonsensical words or phrases to be inadvertently transcribed; Although I have reviewed the note for such errors, some may still exist.

## 2019-01-02 ENCOUNTER — LAB (OUTPATIENT)
Dept: LAB | Facility: HOSPITAL | Age: 59
End: 2019-01-02

## 2019-01-02 DIAGNOSIS — Z00.00 ENCOUNTER FOR PREVENTATIVE ADULT HEALTH CARE EXAMINATION: ICD-10-CM

## 2019-01-02 LAB
ALBUMIN SERPL-MCNC: 4.3 G/DL (ref 3.4–4.8)
ALBUMIN/GLOB SERPL: 1.8 G/DL (ref 1.1–1.8)
ALP SERPL-CCNC: 84 U/L (ref 38–126)
ALT SERPL W P-5'-P-CCNC: 14 U/L (ref 9–52)
ANION GAP SERPL CALCULATED.3IONS-SCNC: 8 MMOL/L (ref 5–15)
ARTICHOKE IGE QN: 91 MG/DL (ref 1–129)
AST SERPL-CCNC: 22 U/L (ref 14–36)
BASOPHILS # BLD AUTO: 0.07 10*3/MM3 (ref 0–0.2)
BASOPHILS NFR BLD AUTO: 0.7 % (ref 0–2)
BILIRUB SERPL-MCNC: 0.1 MG/DL (ref 0.2–1.3)
BUN BLD-MCNC: 16 MG/DL (ref 7–21)
BUN/CREAT SERPL: 15.8 (ref 7–25)
CALCIUM SPEC-SCNC: 9 MG/DL (ref 8.4–10.2)
CHLORIDE SERPL-SCNC: 105 MMOL/L (ref 95–110)
CHOLEST SERPL-MCNC: 156 MG/DL (ref 0–199)
CO2 SERPL-SCNC: 25 MMOL/L (ref 22–31)
CREAT BLD-MCNC: 1.01 MG/DL (ref 0.5–1)
DEPRECATED RDW RBC AUTO: 47.3 FL (ref 36.4–46.3)
EOSINOPHIL # BLD AUTO: 0.42 10*3/MM3 (ref 0–0.7)
EOSINOPHIL NFR BLD AUTO: 4.1 % (ref 0–7)
ERYTHROCYTE [DISTWIDTH] IN BLOOD BY AUTOMATED COUNT: 14.7 % (ref 11.5–14.5)
GFR SERPL CREATININE-BSD FRML MDRD: 56 ML/MIN/1.73 (ref 51–120)
GLOBULIN UR ELPH-MCNC: 2.4 GM/DL (ref 2.3–3.5)
GLUCOSE BLD-MCNC: 94 MG/DL (ref 60–100)
HBA1C MFR BLD: 5.4 % (ref 4–5.6)
HCT VFR BLD AUTO: 39.2 % (ref 35–45)
HDLC SERPL-MCNC: 42 MG/DL (ref 60–200)
HGB BLD-MCNC: 13 G/DL (ref 12–15.5)
IMM GRANULOCYTES # BLD AUTO: 0.04 10*3/MM3 (ref 0–0.02)
IMM GRANULOCYTES NFR BLD AUTO: 0.4 % (ref 0–0.5)
LDLC/HDLC SERPL: 2.02 {RATIO} (ref 0–3.22)
LYMPHOCYTES # BLD AUTO: 2.96 10*3/MM3 (ref 0.6–4.2)
LYMPHOCYTES NFR BLD AUTO: 29.2 % (ref 10–50)
MCH RBC QN AUTO: 29 PG (ref 26.5–34)
MCHC RBC AUTO-ENTMCNC: 33.2 G/DL (ref 31.4–36)
MCV RBC AUTO: 87.5 FL (ref 80–98)
MONOCYTES # BLD AUTO: 0.63 10*3/MM3 (ref 0–0.9)
MONOCYTES NFR BLD AUTO: 6.2 % (ref 0–12)
NEUTROPHILS # BLD AUTO: 6.03 10*3/MM3 (ref 2–8.6)
NEUTROPHILS NFR BLD AUTO: 59.4 % (ref 37–80)
PLATELET # BLD AUTO: 331 10*3/MM3 (ref 150–450)
PMV BLD AUTO: 9.3 FL (ref 8–12)
POTASSIUM BLD-SCNC: 4.1 MMOL/L (ref 3.5–5.1)
PROT SERPL-MCNC: 6.7 G/DL (ref 6.3–8.6)
RBC # BLD AUTO: 4.48 10*6/MM3 (ref 3.77–5.16)
SODIUM BLD-SCNC: 138 MMOL/L (ref 137–145)
TRIGL SERPL-MCNC: 146 MG/DL (ref 20–199)
TSH SERPL DL<=0.05 MIU/L-ACNC: 0.55 MIU/ML (ref 0.46–4.68)
WBC NRBC COR # BLD: 10.15 10*3/MM3 (ref 3.2–9.8)

## 2019-01-02 PROCEDURE — 80053 COMPREHEN METABOLIC PANEL: CPT

## 2019-01-02 PROCEDURE — 84443 ASSAY THYROID STIM HORMONE: CPT

## 2019-01-02 PROCEDURE — 80061 LIPID PANEL: CPT

## 2019-01-02 PROCEDURE — 36415 COLL VENOUS BLD VENIPUNCTURE: CPT

## 2019-01-02 PROCEDURE — 83036 HEMOGLOBIN GLYCOSYLATED A1C: CPT

## 2019-01-02 PROCEDURE — 85025 COMPLETE CBC W/AUTO DIFF WBC: CPT

## 2019-01-03 DIAGNOSIS — R79.89 ELEVATED SERUM CREATININE: Primary | ICD-10-CM

## 2019-01-03 NOTE — PROGRESS NOTES
Slight increase in WBC count. If she having signs of infection or has she recently been on steroids?, Creatinine slightly elevated. Have her come back in one month to the lab and have her labs rechecked collected. Follow-up as scheduled or sooner if needed.

## 2019-01-23 ENCOUNTER — TELEPHONE (OUTPATIENT)
Dept: FAMILY MEDICINE CLINIC | Facility: CLINIC | Age: 59
End: 2019-01-23

## 2019-01-23 RX ORDER — HYDROXYZINE PAMOATE 25 MG/1
25 CAPSULE ORAL
Qty: 30 CAPSULE | Refills: 1 | Status: SHIPPED | OUTPATIENT
Start: 2019-01-23 | End: 2020-04-06 | Stop reason: SDUPTHER

## 2019-01-23 NOTE — TELEPHONE ENCOUNTER
Patient needs refills on her hydrOXYzine (VISTARIL) 25 MG capsule. Send to [facility]. Call her at [phone]

## 2019-02-14 ENCOUNTER — HOSPITAL ENCOUNTER (OUTPATIENT)
Facility: HOSPITAL | Age: 59
Setting detail: HOSPITAL OUTPATIENT SURGERY
Discharge: HOME | End: 2019-02-14
Attending: INTERNAL MEDICINE | Admitting: INTERNAL MEDICINE

## 2019-02-14 ENCOUNTER — ANESTHESIA EVENT (OUTPATIENT)
Dept: GASTROENTEROLOGY | Facility: HOSPITAL | Age: 59
End: 2019-02-14

## 2019-02-14 ENCOUNTER — ANESTHESIA (OUTPATIENT)
Dept: GASTROENTEROLOGY | Facility: HOSPITAL | Age: 59
End: 2019-02-14

## 2019-02-14 VITALS
OXYGEN SATURATION: 97 % | SYSTOLIC BLOOD PRESSURE: 107 MMHG | RESPIRATION RATE: 19 BRPM | WEIGHT: 142.38 LBS | BODY MASS INDEX: 25.23 KG/M2 | HEART RATE: 60 BPM | DIASTOLIC BLOOD PRESSURE: 63 MMHG | TEMPERATURE: 97.2 F | HEIGHT: 63 IN

## 2019-02-14 DIAGNOSIS — Z12.11 ENCOUNTER FOR SCREENING FOR MALIGNANT NEOPLASM OF COLON: ICD-10-CM

## 2019-02-14 PROCEDURE — 88305 TISSUE EXAM BY PATHOLOGIST: CPT | Performed by: PATHOLOGY

## 2019-02-14 PROCEDURE — 88305 TISSUE EXAM BY PATHOLOGIST: CPT | Performed by: INTERNAL MEDICINE

## 2019-02-14 PROCEDURE — 45385 COLONOSCOPY W/LESION REMOVAL: CPT | Performed by: INTERNAL MEDICINE

## 2019-02-14 PROCEDURE — 25010000002 PROPOFOL 10 MG/ML EMULSION: Performed by: NURSE ANESTHETIST, CERTIFIED REGISTERED

## 2019-02-14 RX ORDER — LIDOCAINE HYDROCHLORIDE 10 MG/ML
INJECTION, SOLUTION INFILTRATION; PERINEURAL AS NEEDED
Status: DISCONTINUED | OUTPATIENT
Start: 2019-02-14 | End: 2019-02-14 | Stop reason: SURG

## 2019-02-14 RX ORDER — PROPOFOL 10 MG/ML
VIAL (ML) INTRAVENOUS AS NEEDED
Status: DISCONTINUED | OUTPATIENT
Start: 2019-02-14 | End: 2019-02-14 | Stop reason: SURG

## 2019-02-14 RX ORDER — DEXTROSE AND SODIUM CHLORIDE 5; .45 G/100ML; G/100ML
30 INJECTION, SOLUTION INTRAVENOUS CONTINUOUS PRN
Status: DISCONTINUED | OUTPATIENT
Start: 2019-02-14 | End: 2019-02-14 | Stop reason: HOSPADM

## 2019-02-14 RX ADMIN — DEXTROSE AND SODIUM CHLORIDE 30 ML/HR: 5; 450 INJECTION, SOLUTION INTRAVENOUS at 08:36

## 2019-02-14 RX ADMIN — PROPOFOL 20 MG: 10 INJECTION, EMULSION INTRAVENOUS at 08:55

## 2019-02-14 RX ADMIN — PROPOFOL 20 MG: 10 INJECTION, EMULSION INTRAVENOUS at 09:03

## 2019-02-14 RX ADMIN — PROPOFOL 20 MG: 10 INJECTION, EMULSION INTRAVENOUS at 08:58

## 2019-02-14 RX ADMIN — PROPOFOL 20 MG: 10 INJECTION, EMULSION INTRAVENOUS at 08:49

## 2019-02-14 RX ADMIN — PROPOFOL 20 MG: 10 INJECTION, EMULSION INTRAVENOUS at 09:09

## 2019-02-14 RX ADMIN — PROPOFOL 100 MG: 10 INJECTION, EMULSION INTRAVENOUS at 08:46

## 2019-02-14 RX ADMIN — DEXTROSE AND SODIUM CHLORIDE: 5; 450 INJECTION, SOLUTION INTRAVENOUS at 08:40

## 2019-02-14 RX ADMIN — PROPOFOL 20 MG: 10 INJECTION, EMULSION INTRAVENOUS at 08:52

## 2019-02-14 RX ADMIN — PROPOFOL 20 MG: 10 INJECTION, EMULSION INTRAVENOUS at 09:01

## 2019-02-14 RX ADMIN — PROPOFOL 20 MG: 10 INJECTION, EMULSION INTRAVENOUS at 08:48

## 2019-02-14 RX ADMIN — PROPOFOL 20 MG: 10 INJECTION, EMULSION INTRAVENOUS at 08:54

## 2019-02-14 RX ADMIN — PROPOFOL 20 MG: 10 INJECTION, EMULSION INTRAVENOUS at 08:53

## 2019-02-14 RX ADMIN — PROPOFOL 10 MG: 10 INJECTION, EMULSION INTRAVENOUS at 09:05

## 2019-02-14 RX ADMIN — LIDOCAINE HYDROCHLORIDE 80 MG: 10 INJECTION, SOLUTION INFILTRATION; PERINEURAL at 08:46

## 2019-02-14 NOTE — H&P (VIEW-ONLY)
"[facility]      Chief Complaint:   No chief complaint on file.      Subjective     HPI:   Colon cancer screening.  Patient denies complaint. No abdominal pain or rectal bleeding. Patient says that she is trying to lose weight intentionally. Denies family history of colon cancer.    Constipation.  Reports history of constipation for which she takes a \"prescribed medicine\" she does not remember the name, but she says the medicine helps to keep her bowels regular.    PLAN:  Colonoscopy. Extended bowel prep.     The risks, benefits, and alternatives of this procedure have been discussed with the patient. The risks including but not limited to bleeding which may require blood transfusion, perforation which may require surgical management, infection, aspiration, or anesthesia related complication. The patient verbalized understanding. All the patient's questions were answered. The patient agrees to proceed.      Past History:   Past Medical History:   Diagnosis Date   • Anxiety     Anxiety state      • Bipolar affective disorder, current episode depressed (CMS/HCC)    • Bipolar disorder (CMS/HCC)    • Breast cyst    • Drug therapy     Long-term drug therapy      • Essential hypertension    • Headache    • Hyperlipidemia    • Influenza    • Liver function test abnormality     Abnormal liver test   • Neuropathic pain    • Obesity    • Pain of breast     No evidence of cancer      • Urinary tract infectious disease      Past Surgical History:   Procedure Laterality Date   • APPENDECTOMY      Appendectomy (1)      • BREAST BIOPSY     • DIAGNOSTIC LAPAROSCOPY  03/04/2003    Laparosc diagnostic (1)      • ENDOSCOPY W/ PEG TUBE PLACEMENT  03/21/2003    EGD w/ tube 78101 (1)      • LAPAROSCOPY ESOPHAGOGASTRIC FUNDOPLASTY HYBRID  02/10/2005    Fundoplasty, laparoscopic (1)      • PAP SMEAR  08/18/2009    PAP SMEAR (1)      • TOTAL ABDOMINAL HYSTERECTOMY WITH SALPINGO OOPHORECTOMY  03/25/2003    JOSE/BSO (1) "          Family History:  Family History   Problem Relation Age of Onset   • Depression Other    • Diabetes Other    • Heart disease Other    • Hypertension Other    • Stroke Other    • Lung disease Other    • Other Other         Colon problems   • COPD Mother    • Heart disease Father    • Lung disease Father    • Hypertension Sister    • Hypertension Brother    • Fibromyalgia Daughter    • Anxiety disorder Son    • Diabetes Brother    • No Known Problems Daughter        Social History:   reports that she has been smoking cigarettes.  She has a 25.00 pack-year smoking history. she has never used smokeless tobacco. She reports that she drinks alcohol. She reports that she does not use drugs.    Medications:   Prior to Admission medications    Medication Sig Start Date End Date Taking? Authorizing Provider   albuterol (ACCUNEB) 1.25 MG/3ML nebulizer solution Take 3 mL by nebulization Every 6 (Six) Hours As Needed for Wheezing or Shortness of Air. 12/7/18  Yes Nikunj Lipscomb APRN   albuterol (PROAIR HFA) 108 (90 Base) MCG/ACT inhaler Inhale 2 puffs Every 4 (Four) Hours As Needed for Wheezing or Shortness of Air. 12/7/18  Yes Nikunj Lipscomb APRN   aspirin 81 MG tablet Take 81 mg by mouth.   Yes Gilma Cruz MD   azelastine (ASTELIN) 0.1 % nasal spray 2 sprays into the nostril(s) as directed by provider As Needed for Rhinitis. Use in each nostril as directed   Yes Gilma Cruz MD   Biotin 1000 MCG tablet Take 1,000 mcg by mouth.   Yes Gilma Cruz MD   Biotin w/ Vitamins C & E (HAIR/SKIN/NAILS PO) Take  by mouth.   Yes Gilma Cruz MD   Buprenorphine 20 MCG/HR patch weekly Place 1 patch on the skin 1 (One) Time Per Week. 8/23/17  Yes Carlos Mchugh MD   busPIRone (BUSPAR) 7.5 MG tablet Take 7.5 mg by mouth.   Yes Gilma Cruz MD   Calcium Citrate-Vitamin D (CALCIUM CITRATE + D PO) Take 1 tablet by mouth Daily.   Yes Gilma Cruz MD   cefuroxime (CEFTIN)  500 MG tablet Take 1 tablet by mouth 2 (Two) Times a Day for 10 days. 12/7/18 12/17/18 Yes Nikunj Lipscomb APRN   Cholecalciferol (VITAMIN D3) 57148 units capsule Take 1 capsule by mouth Every 7 (Seven) Days. 12/7/18  Yes Nikunj Lipscomb APRN   COLLAGEN PO Take 1,000 mg by mouth Daily.   Yes Gilma Cruz MD   cyclobenzaprine (FLEXERIL) 5 MG tablet Take 5 mg by mouth. 12/3/18 2/2/19 Yes Gilma Cruz MD   famotidine (PEPCID) 40 MG tablet Take 1 tablet by mouth Daily. 12/7/18  Yes Nikunj Lipscomb APRN   hydrOXYzine (VISTARIL) 25 MG capsule Take 25 mg by mouth.   Yes ProviderGilma MD   lamoTRIgine (LaMICtal) 200 MG tablet Take 200 mg by mouth Daily.   Yes Gilma Cruz MD   lisinopril (PRINIVIL,ZESTRIL) 40 MG tablet Take 40 mg by mouth.   Yes ProviderGilma MD   loratadine (CLARITIN) 10 MG tablet Take 10 mg by mouth.   Yes ProviderGilma MD   magnesium oxide (MAGOX) 400 (241.3 MG) MG tablet tablet Take 400 mg by mouth Daily.   Yes ProviderGilma MD   Melatonin 10 MG capsule Take  by mouth.   Yes ProviderGilma MD   melatonin 5 MG tablet tablet Take 5 mg by mouth.   Yes ProviderGilma MD   metoprolol tartrate (LOPRESSOR) 25 MG tablet Take 25 mg by mouth 2 (Two) Times a Day.   Yes ProviderGilma MD   Milk Thistle 1000 MG capsule Take  by mouth.   Yes ProviderGilma MD   Multiple Vitamin (MULTIVITAMIN+ PO) Take 1 tablet by mouth Daily.   Yes ProviderGilma MD   Naloxegol Oxalate (MOVANTIK) 25 MG tablet Take 25 mg by mouth. 8/20/18  Yes Gilma Cruz MD   Niacin, Antihyperlipidemic, 500 MG tablet Take  by mouth.   Yes ProviderGilma MD   Omega-3 Fatty Acids (OMEGA-3 FISH OIL) 1000 MG capsule Take 1,000 mg by mouth Daily.   Yes Gilma Cruz MD   pregabalin (LYRICA) 150 MG capsule Take 1 capsule by mouth 2 (Two) Times a Day. 8/23/17  Yes Carlos Mchugh MD   rosuvastatin (CRESTOR) 20 MG tablet Take 20  mg by mouth.   Yes Gilma Cruz MD   topiramate (TOPAMAX) 100 MG tablet Take 50 mg by mouth 2 (Two) Times a Day. 1/2 tablet in am and 1 tablet pm for headaches.   Yes Gilma Cruz MD   topiramate (TOPAMAX) 50 MG tablet Take 1 tablet by mouth Daily. 12/7/18  Yes Nikunj Lipscomb APRN   traZODone (DESYREL) 150 MG tablet Take 1 tablet by mouth At Night As Needed for Sleep. 12/7/18  Yes Nikunj Lipscomb APRN   vitamin A 38845 UNIT capsule Take 10,000 Units by mouth Daily.   Yes Gilma Cruz MD   vitamin C (ASCORBIC ACID) 250 MG tablet Take 500 mg by mouth.   Yes Gilma Cruz MD   vitamin E 400 UNIT capsule Take 400 Units by mouth Daily.   Yes Gilma Cruz MD   Vortioxetine HBr (TRINTELLIX) 20 MG tablet Take 20 mg by mouth Daily.   Yes Gilma Cruz MD   PEG-KCl-NaCl-NaSulf-Na Asc-C (MOVIPREP) 100 g reconstituted solution powder Take 2,000 mL by mouth 1 (One) Time for 1 dose. Take as per instruction sheet for colonoscopy prep. 12/14/18 12/14/18  Jaiyeola, Aderemi, MD       Allergies:  Patient has no known allergies.    ROS:    Review of Systems   Constitutional: Negative for chills, diaphoresis and fever.   HENT: Negative for ear discharge, ear pain and tinnitus.    Eyes: Negative for pain and discharge.   Respiratory: Negative for apnea and choking.    Cardiovascular: Negative for chest pain, palpitations and leg swelling.   Gastrointestinal: Positive for constipation. Negative for abdominal distention, abdominal pain, anal bleeding, blood in stool, diarrhea, nausea, rectal pain and vomiting.   Endocrine: Negative for heat intolerance, polydipsia and polyuria.   Genitourinary: Negative for dysuria and hematuria.   Musculoskeletal: Negative for joint swelling and neck stiffness.   Skin: Negative for color change and pallor.   Neurological: Negative for seizures and headaches.   Hematological: Negative for adenopathy. Does not bruise/bleed easily.  "  Psychiatric/Behavioral: Negative for hallucinations and suicidal ideas.     Objective     Blood pressure 167/74, pulse 78, temperature 97.8 °F (36.6 °C), resp. rate 16, height 160 cm (62.99\"), weight 64.6 kg (142 lb 6 oz), SpO2 98 %.    Physical Exam   Constitutional: She is oriented to person, place, and time. She appears well-developed.   HENT:   Head: Normocephalic.   Right Ear: External ear normal.   Left Ear: External ear normal.   Eyes: Conjunctivae are normal. Right eye exhibits no discharge. Left eye exhibits no discharge. No scleral icterus.   Neck: Neck supple. No thyromegaly present.   Cardiovascular: Normal rate and regular rhythm.   Pulmonary/Chest: Effort normal. She has no wheezes.   Abdominal: Soft. She exhibits no distension and no mass. There is no tenderness. There is no rebound and no guarding.   Musculoskeletal: She exhibits no edema or deformity.   Neurological: She is alert and oriented to person, place, and time.   Skin: Skin is warm.   Psychiatric: Her behavior is normal.        Laboratory Data:   No results for input(s): GLUCOSE, BUN, CREATININE, NA, K, CL, CO2, CALCIUM, PROTEINTOT, ALBUMIN, ALT, AST, ALKPHOS, BILITOT, EGFRIFNONA, GLOB, AGRATIO, BCR, ANIONGAP, BILIDIR, INDBILI in the last 64313 hours.  No results for input(s): WBC, RBC, HGB, HCT, MCV, MCH, MCHC, RDW, RDWSD, MPV, PLT in the last 00823 hours.  No results for input(s): INR in the last 15903 hours.  No results for input(s): CRP in the last 92125 hours.  No results for input(s): LIPASE in the last 94046 hours.  No results for input(s): AMYLASE in the last 05688 hours.    No radiology results for the last 30 days.    Assessment/Plan   Ga was seen today for colon cancer screening.    Diagnoses and all orders for this visit:    Encounter for screening for malignant neoplasm of colon  -     Case Request; Standing  -     dextrose 5 % and sodium chloride 0.45 % infusion; Infuse 30 mL/hr into a venous catheter Continuous As " Needed (Start Prior to Procedure).  -     Case Request  -     PEG-KCl-NaCl-NaSulf-Na Asc-C (MOVIPREP) 100 g reconstituted solution powder; Take 2,000 mL by mouth 1 (One) Time for 1 dose. Take as per instruction sheet for colonoscopy prep.    Other orders  -     Implement Anesthesia Orders Day of Procedure; Standing  -     Obtain Informed Consent; Standing  -     Verify Bowel Prep Was Successful; Standing  -     Give Tap Water Enema If Bowel Prep Insufficient; Standing  -     Hold Medication patient not on anticoagulation; Standing  -     POC Glucose Once; Standing        COLONOSCOPY (N/A)    No diagnosis found.    Anticipated Surgical Procedure:  Orders Placed This Encounter   Procedures   • Vital Signs Every 5 Minutes for 15 Minutes, Every 15 Minutes Thereafter.     Standing Status:   Standing     Number of Occurrences:   1   • Notify Anesthesia of Any Acute Changes in Patient Condition     Standing Status:   Standing     Number of Occurrences:   1     Order Specific Question:   Other     Answer:   Any Acute Changes in Patient Condition   • Notify Anesthesia for Unrelieved Pain     Standing Status:   Standing     Number of Occurrences:   1     Order Specific Question:   Other     Answer:   Unrelieved Pain   • Pulse Oximetry, Continuous     Standing Status:   Standing     Number of Occurrences:   1   • Once Discharge Criteria to Floor Met, Follow Surgeon Orders     Standing Status:   Standing     Number of Occurrences:   1   • Discharge Patient From PACU When Discharge Criteria Met     Standing Status:   Standing     Number of Occurrences:   1   • Oxygen Therapy- Blow by - Humidified; Titrate for SPO2: equal to or greater than, 96%, per policy     Standing Status:   Standing     Number of Occurrences:   1     Order Specific Question:   Device     Answer:   Blow by - Humidified     Order Specific Question:   Titrate for SPO2     Answer:   equal to or greater than     Order Specific Question:   Titrate for SPO2      Answer:   96%     Order Specific Question:   Titrate for SPO2     Answer:   per policy     Order Specific Question:   Indications for Oxygen Therapy     Answer:   Immediate Post-Op Period           I discussed the assessment and recommendations with the patient. She verbalized understanding. All questions were answered. The patient denies any further question.    Aderemi Jaiyeola, MD  02/14/19  8:34 AM    EMR Dragon/Transcription disclaimer:   Some of this note may be an electronic transcription/translation of spoken language to printed text. The electronic translation of spoken language may permit erroneous, or at times, nonsensical words or phrases to be inadvertently transcribed; Although I have reviewed the note for such errors, some may still exist.

## 2019-02-14 NOTE — ANESTHESIA PREPROCEDURE EVALUATION
Anesthesia Evaluation     Patient summary reviewed and Nursing notes reviewed   NPO Solid Status: > 8 hours  NPO Liquid Status: > 2 hours           Airway   Mallampati: II  TM distance: >3 FB  Neck ROM: full  No difficulty expected  Dental    (+) implants and upper dentures        Pulmonary - normal exam   (+) a smoker Current Smoked day of surgery,   Cardiovascular - normal exam    Patient on routine beta blocker    (+) hypertension well controlled 2 medications or greater,       Neuro/Psych  (+) headaches, psychiatric history Anxiety and Depression,     GI/Hepatic/Renal/Endo - negative ROS   (-)  obesity, morbid obesity    Musculoskeletal (-) negative ROS    Abdominal  - normal exam   Substance History - negative use     OB/GYN negative ob/gyn ROS         Other                        Anesthesia Plan    ASA 2     MAC     intravenous induction   Anesthetic plan, all risks, benefits, and alternatives have been provided, discussed and informed consent has been obtained with: patient.

## 2019-02-14 NOTE — PROGRESS NOTES
"[facility]      Chief Complaint:   No chief complaint on file.      Subjective     HPI:   Colon cancer screening.  Patient denies complaint. No abdominal pain or rectal bleeding. Patient says that she is trying to lose weight intentionally. Denies family history of colon cancer.    Constipation.  Reports history of constipation for which she takes a \"prescribed medicine\" she does not remember the name, but she says the medicine helps to keep her bowels regular.    PLAN:  Colonoscopy. Extended bowel prep.     The risks, benefits, and alternatives of this procedure have been discussed with the patient. The risks including but not limited to bleeding which may require blood transfusion, perforation which may require surgical management, infection, aspiration, or anesthesia related complication. The patient verbalized understanding. All the patient's questions were answered. The patient agrees to proceed.      Past History:   Past Medical History:   Diagnosis Date   • Anxiety     Anxiety state      • Bipolar affective disorder, current episode depressed (CMS/HCC)    • Bipolar disorder (CMS/HCC)    • Breast cyst    • Drug therapy     Long-term drug therapy      • Essential hypertension    • Headache    • Hyperlipidemia    • Influenza    • Liver function test abnormality     Abnormal liver test   • Neuropathic pain    • Obesity    • Pain of breast     No evidence of cancer      • Urinary tract infectious disease      Past Surgical History:   Procedure Laterality Date   • APPENDECTOMY      Appendectomy (1)      • BREAST BIOPSY     • DIAGNOSTIC LAPAROSCOPY  03/04/2003    Laparosc diagnostic (1)      • ENDOSCOPY W/ PEG TUBE PLACEMENT  03/21/2003    EGD w/ tube 50845 (1)      • LAPAROSCOPY ESOPHAGOGASTRIC FUNDOPLASTY HYBRID  02/10/2005    Fundoplasty, laparoscopic (1)      • PAP SMEAR  08/18/2009    PAP SMEAR (1)      • TOTAL ABDOMINAL HYSTERECTOMY WITH SALPINGO OOPHORECTOMY  03/25/2003    JOSE/BSO (1) "          Family History:  Family History   Problem Relation Age of Onset   • Depression Other    • Diabetes Other    • Heart disease Other    • Hypertension Other    • Stroke Other    • Lung disease Other    • Other Other         Colon problems   • COPD Mother    • Heart disease Father    • Lung disease Father    • Hypertension Sister    • Hypertension Brother    • Fibromyalgia Daughter    • Anxiety disorder Son    • Diabetes Brother    • No Known Problems Daughter        Social History:   reports that she has been smoking cigarettes.  She has a 25.00 pack-year smoking history. she has never used smokeless tobacco. She reports that she drinks alcohol. She reports that she does not use drugs.    Medications:   Prior to Admission medications    Medication Sig Start Date End Date Taking? Authorizing Provider   albuterol (ACCUNEB) 1.25 MG/3ML nebulizer solution Take 3 mL by nebulization Every 6 (Six) Hours As Needed for Wheezing or Shortness of Air. 12/7/18  Yes Nikunj Lipscomb APRN   albuterol (PROAIR HFA) 108 (90 Base) MCG/ACT inhaler Inhale 2 puffs Every 4 (Four) Hours As Needed for Wheezing or Shortness of Air. 12/7/18  Yes Nikunj Lipscomb APRN   aspirin 81 MG tablet Take 81 mg by mouth.   Yes Gilma Cruz MD   azelastine (ASTELIN) 0.1 % nasal spray 2 sprays into the nostril(s) as directed by provider As Needed for Rhinitis. Use in each nostril as directed   Yes Gilma Cruz MD   Biotin 1000 MCG tablet Take 1,000 mcg by mouth.   Yes Gilma Cruz MD   Biotin w/ Vitamins C & E (HAIR/SKIN/NAILS PO) Take  by mouth.   Yes Gilma Cruz MD   Buprenorphine 20 MCG/HR patch weekly Place 1 patch on the skin 1 (One) Time Per Week. 8/23/17  Yes Carlos Mchugh MD   busPIRone (BUSPAR) 7.5 MG tablet Take 7.5 mg by mouth.   Yes Gilma Cruz MD   Calcium Citrate-Vitamin D (CALCIUM CITRATE + D PO) Take 1 tablet by mouth Daily.   Yes Gilma Cruz MD   cefuroxime (CEFTIN)  500 MG tablet Take 1 tablet by mouth 2 (Two) Times a Day for 10 days. 12/7/18 12/17/18 Yes Nikunj Lipscomb APRN   Cholecalciferol (VITAMIN D3) 13679 units capsule Take 1 capsule by mouth Every 7 (Seven) Days. 12/7/18  Yes Nikunj Lipscomb APRN   COLLAGEN PO Take 1,000 mg by mouth Daily.   Yes Gilma Cruz MD   cyclobenzaprine (FLEXERIL) 5 MG tablet Take 5 mg by mouth. 12/3/18 2/2/19 Yes Gilma Cruz MD   famotidine (PEPCID) 40 MG tablet Take 1 tablet by mouth Daily. 12/7/18  Yes Nikunj Lipscomb APRN   hydrOXYzine (VISTARIL) 25 MG capsule Take 25 mg by mouth.   Yes ProviderGilma MD   lamoTRIgine (LaMICtal) 200 MG tablet Take 200 mg by mouth Daily.   Yes Gilma Cruz MD   lisinopril (PRINIVIL,ZESTRIL) 40 MG tablet Take 40 mg by mouth.   Yes ProviderGilma MD   loratadine (CLARITIN) 10 MG tablet Take 10 mg by mouth.   Yes ProviderGilma MD   magnesium oxide (MAGOX) 400 (241.3 MG) MG tablet tablet Take 400 mg by mouth Daily.   Yes ProviderGilma MD   Melatonin 10 MG capsule Take  by mouth.   Yes ProviderGilma MD   melatonin 5 MG tablet tablet Take 5 mg by mouth.   Yes ProviderGilma MD   metoprolol tartrate (LOPRESSOR) 25 MG tablet Take 25 mg by mouth 2 (Two) Times a Day.   Yes ProviderGilma MD   Milk Thistle 1000 MG capsule Take  by mouth.   Yes ProviderGilma MD   Multiple Vitamin (MULTIVITAMIN+ PO) Take 1 tablet by mouth Daily.   Yes ProviderGilma MD   Naloxegol Oxalate (MOVANTIK) 25 MG tablet Take 25 mg by mouth. 8/20/18  Yes Gilma Cruz MD   Niacin, Antihyperlipidemic, 500 MG tablet Take  by mouth.   Yes ProviderGilma MD   Omega-3 Fatty Acids (OMEGA-3 FISH OIL) 1000 MG capsule Take 1,000 mg by mouth Daily.   Yes Gilma Cruz MD   pregabalin (LYRICA) 150 MG capsule Take 1 capsule by mouth 2 (Two) Times a Day. 8/23/17  Yes Carlos Mchugh MD   rosuvastatin (CRESTOR) 20 MG tablet Take 20  mg by mouth.   Yes Gilma Cruz MD   topiramate (TOPAMAX) 100 MG tablet Take 50 mg by mouth 2 (Two) Times a Day. 1/2 tablet in am and 1 tablet pm for headaches.   Yes Gilma Cruz MD   topiramate (TOPAMAX) 50 MG tablet Take 1 tablet by mouth Daily. 12/7/18  Yes Nikunj Lipscomb APRN   traZODone (DESYREL) 150 MG tablet Take 1 tablet by mouth At Night As Needed for Sleep. 12/7/18  Yes Nikunj Lipscomb APRN   vitamin A 28379 UNIT capsule Take 10,000 Units by mouth Daily.   Yes Gilma Cruz MD   vitamin C (ASCORBIC ACID) 250 MG tablet Take 500 mg by mouth.   Yes Gilma Cruz MD   vitamin E 400 UNIT capsule Take 400 Units by mouth Daily.   Yes Gilma Cruz MD   Vortioxetine HBr (TRINTELLIX) 20 MG tablet Take 20 mg by mouth Daily.   Yes Gilma Cruz MD   PEG-KCl-NaCl-NaSulf-Na Asc-C (MOVIPREP) 100 g reconstituted solution powder Take 2,000 mL by mouth 1 (One) Time for 1 dose. Take as per instruction sheet for colonoscopy prep. 12/14/18 12/14/18  Jaiyeola, Aderemi, MD       Allergies:  Patient has no known allergies.    ROS:    Review of Systems   Constitutional: Negative for chills, diaphoresis and fever.   HENT: Negative for ear discharge, ear pain and tinnitus.    Eyes: Negative for pain and discharge.   Respiratory: Negative for apnea and choking.    Cardiovascular: Negative for chest pain, palpitations and leg swelling.   Gastrointestinal: Positive for constipation. Negative for abdominal distention, abdominal pain, anal bleeding, blood in stool, diarrhea, nausea, rectal pain and vomiting.   Endocrine: Negative for heat intolerance, polydipsia and polyuria.   Genitourinary: Negative for dysuria and hematuria.   Musculoskeletal: Negative for joint swelling and neck stiffness.   Skin: Negative for color change and pallor.   Neurological: Negative for seizures and headaches.   Hematological: Negative for adenopathy. Does not bruise/bleed easily.  "  Psychiatric/Behavioral: Negative for hallucinations and suicidal ideas.     Objective     Blood pressure 167/74, pulse 78, temperature 97.8 °F (36.6 °C), resp. rate 16, height 160 cm (62.99\"), weight 64.6 kg (142 lb 6 oz), SpO2 98 %.    Physical Exam   Constitutional: She is oriented to person, place, and time. She appears well-developed.   HENT:   Head: Normocephalic.   Right Ear: External ear normal.   Left Ear: External ear normal.   Eyes: Conjunctivae are normal. Right eye exhibits no discharge. Left eye exhibits no discharge. No scleral icterus.   Neck: Neck supple. No thyromegaly present.   Cardiovascular: Normal rate and regular rhythm.   Pulmonary/Chest: Effort normal. She has no wheezes.   Abdominal: Soft. She exhibits no distension and no mass. There is no tenderness. There is no rebound and no guarding.   Musculoskeletal: She exhibits no edema or deformity.   Neurological: She is alert and oriented to person, place, and time.   Skin: Skin is warm.   Psychiatric: Her behavior is normal.        Laboratory Data:   No results for input(s): GLUCOSE, BUN, CREATININE, NA, K, CL, CO2, CALCIUM, PROTEINTOT, ALBUMIN, ALT, AST, ALKPHOS, BILITOT, EGFRIFNONA, GLOB, AGRATIO, BCR, ANIONGAP, BILIDIR, INDBILI in the last 33320 hours.  No results for input(s): WBC, RBC, HGB, HCT, MCV, MCH, MCHC, RDW, RDWSD, MPV, PLT in the last 52048 hours.  No results for input(s): INR in the last 30015 hours.  No results for input(s): CRP in the last 29752 hours.  No results for input(s): LIPASE in the last 42891 hours.  No results for input(s): AMYLASE in the last 23271 hours.    No radiology results for the last 30 days.    Assessment/Plan   Ga was seen today for colon cancer screening.    Diagnoses and all orders for this visit:    Encounter for screening for malignant neoplasm of colon  -     Case Request; Standing  -     dextrose 5 % and sodium chloride 0.45 % infusion; Infuse 30 mL/hr into a venous catheter Continuous As " Needed (Start Prior to Procedure).  -     Case Request  -     PEG-KCl-NaCl-NaSulf-Na Asc-C (MOVIPREP) 100 g reconstituted solution powder; Take 2,000 mL by mouth 1 (One) Time for 1 dose. Take as per instruction sheet for colonoscopy prep.    Other orders  -     Implement Anesthesia Orders Day of Procedure; Standing  -     Obtain Informed Consent; Standing  -     Verify Bowel Prep Was Successful; Standing  -     Give Tap Water Enema If Bowel Prep Insufficient; Standing  -     Hold Medication patient not on anticoagulation; Standing  -     POC Glucose Once; Standing        COLONOSCOPY (N/A)    No diagnosis found.    Anticipated Surgical Procedure:  Orders Placed This Encounter   Procedures   • Vital Signs Every 5 Minutes for 15 Minutes, Every 15 Minutes Thereafter.     Standing Status:   Standing     Number of Occurrences:   1   • Notify Anesthesia of Any Acute Changes in Patient Condition     Standing Status:   Standing     Number of Occurrences:   1     Order Specific Question:   Other     Answer:   Any Acute Changes in Patient Condition   • Notify Anesthesia for Unrelieved Pain     Standing Status:   Standing     Number of Occurrences:   1     Order Specific Question:   Other     Answer:   Unrelieved Pain   • Pulse Oximetry, Continuous     Standing Status:   Standing     Number of Occurrences:   1   • Once Discharge Criteria to Floor Met, Follow Surgeon Orders     Standing Status:   Standing     Number of Occurrences:   1   • Discharge Patient From PACU When Discharge Criteria Met     Standing Status:   Standing     Number of Occurrences:   1   • Oxygen Therapy- Blow by - Humidified; Titrate for SPO2: equal to or greater than, 96%, per policy     Standing Status:   Standing     Number of Occurrences:   1     Order Specific Question:   Device     Answer:   Blow by - Humidified     Order Specific Question:   Titrate for SPO2     Answer:   equal to or greater than     Order Specific Question:   Titrate for SPO2      Answer:   96%     Order Specific Question:   Titrate for SPO2     Answer:   per policy     Order Specific Question:   Indications for Oxygen Therapy     Answer:   Immediate Post-Op Period           I discussed the assessment and recommendations with the patient. She verbalized understanding. All questions were answered. The patient denies any further question.    Aderemi Jaiyeola, MD  02/14/19  8:34 AM    EMR Dragon/Transcription disclaimer:   Some of this note may be an electronic transcription/translation of spoken language to printed text. The electronic translation of spoken language may permit erroneous, or at times, nonsensical words or phrases to be inadvertently transcribed; Although I have reviewed the note for such errors, some may still exist.

## 2019-02-14 NOTE — ANESTHESIA POSTPROCEDURE EVALUATION
Patient: Ga Mendoza    Procedure Summary     Date:  02/14/19 Room / Location:  [facility] ENDOSCOPY 1 / [facility] ENDOSCOPY    Anesthesia Start:  0840 Anesthesia Stop:  0920    Procedure:  COLONOSCOPY (N/A ) Diagnosis:       Encounter for screening for malignant neoplasm of colon      (Encounter for screening for malignant neoplasm of colon [Z12.11])    Surgeon:  Jaiyeola, Aderemi, MD Provider:  Autumn Whyte CRNA    Anesthesia Type:  MAC ASA Status:  2          Anesthesia Type: MAC  Last vitals  BP   167/74 (02/14/19 0827)   Temp   97.8 °F (36.6 °C) (02/14/19 0827)   Pulse   78 (02/14/19 0827)   Resp   16 (02/14/19 0827)     SpO2   98 % (02/14/19 0827)     Post Anesthesia Care and Evaluation    Patient location during evaluation: bedside  Patient participation: complete - patient participated  Level of consciousness: awake  Pain score: 0  Pain management: adequate  Airway patency: patent  Anesthetic complications: No anesthetic complications  PONV Status: none  Cardiovascular status: acceptable  Respiratory status: acceptable  Hydration status: acceptable

## 2019-02-15 LAB
LAB AP CASE REPORT: NORMAL
PATH REPORT.FINAL DX SPEC: NORMAL
PATH REPORT.GROSS SPEC: NORMAL

## 2019-02-22 ENCOUNTER — OFFICE VISIT (OUTPATIENT)
Dept: GASTROENTEROLOGY | Facility: CLINIC | Age: 59
End: 2019-02-22

## 2019-02-22 VITALS
SYSTOLIC BLOOD PRESSURE: 110 MMHG | DIASTOLIC BLOOD PRESSURE: 78 MMHG | WEIGHT: 142 LBS | HEIGHT: 63 IN | HEART RATE: 57 BPM | BODY MASS INDEX: 25.16 KG/M2

## 2019-02-22 DIAGNOSIS — D12.5 BENIGN NEOPLASM OF SIGMOID COLON: ICD-10-CM

## 2019-02-22 DIAGNOSIS — K62.1 RECTAL POLYP: ICD-10-CM

## 2019-02-22 DIAGNOSIS — K59.03 DRUG-INDUCED CONSTIPATION: Primary | ICD-10-CM

## 2019-02-22 PROCEDURE — 99212 OFFICE O/P EST SF 10 MIN: CPT | Performed by: NURSE PRACTITIONER

## 2019-02-22 RX ORDER — CYCLOBENZAPRINE HCL 5 MG
TABLET ORAL
Refills: 0 | COMMUNITY
Start: 2019-02-12 | End: 2019-10-14

## 2019-02-22 NOTE — PROGRESS NOTES
Chief Complaint   Patient presents with   • Colon Polyps       Subjective    Ga Mendoza is a 58 y.o. female. she is here today for follow-up.    History of Present Illness  58 year old female presents for colonoscopy follow up. She denies any abdominal pain, nausea or vomiting. Reports chronic constipation and takes movantik per pain clinic. States she occasionally takes laxatives.  Colonoscopy was completed 2/14/19 and noted 2 polyps which were removed from sigmoid colon and 6 polyps from rectum.  Sigmoid colon and descending colon were mildly tortuous.  Nonbleeding hemorrhoids are seen and there was an area of melanosis in sigmoid colon.  Polyps were found to be adenomatous and hyperplastic.  Plan; continue Movantik for opioid-induced constipation.  Repeat colonoscopy in 3 years for surveillance of adenomatous colonic polyps.  Follow-up in GI office sooner if needed.       The following portions of the patient's history were reviewed and updated as appropriate:   Past Medical History:   Diagnosis Date   • Anxiety     Anxiety state      • Bipolar affective disorder, current episode depressed (CMS/HCC)    • Bipolar disorder (CMS/HCC)    • Breast cyst    • Drug therapy     Long-term drug therapy      • Essential hypertension    • Headache    • Hyperlipidemia    • Influenza    • Liver function test abnormality     Abnormal liver test   • Neuropathic pain    • Obesity    • Pain of breast     No evidence of cancer      • Urinary tract infectious disease      Past Surgical History:   Procedure Laterality Date   • APPENDECTOMY      Appendectomy (1)      • BREAST BIOPSY     • COLONOSCOPY N/A 2/14/2019    Procedure: COLONOSCOPY;  Surgeon: Jaiyeola, Aderemi, MD;  Location: [facility] ENDOSCOPY;  Service: General   • DIAGNOSTIC LAPAROSCOPY  03/04/2003    Laparosc diagnostic (1)      • ENDOSCOPY W/ PEG TUBE PLACEMENT  03/21/2003    EGD w/ tube 93282 (1)      • LAPAROSCOPY ESOPHAGOGASTRIC FUNDOPLASTY HYBRID  02/10/2005     Fundoplasty, laparoscopic (1)      • PAP SMEAR  2009    PAP SMEAR (1)      • TOTAL ABDOMINAL HYSTERECTOMY WITH SALPINGO OOPHORECTOMY  2003    JOSE/BSO (1)        Family History   Problem Relation Age of Onset   • Depression Other    • Diabetes Other    • Heart disease Other    • Hypertension Other    • Stroke Other    • Lung disease Other    • Other Other         Colon problems   • COPD Mother    • Heart disease Father    • Lung disease Father    • Hypertension Sister    • Hypertension Brother    • Fibromyalgia Daughter    • Anxiety disorder Son    • Diabetes Brother    • No Known Problems Daughter      OB History      Para Term  AB Living    3 3 3          SAB TAB Ectopic Molar Multiple Live Births                       Prior to Admission medications    Medication Sig Start Date End Date Taking? Authorizing Provider   albuterol (ACCUNEB) 1.25 MG/3ML nebulizer solution Take 3 mL by nebulization Every 6 (Six) Hours As Needed for Wheezing or Shortness of Air. 18  Yes Nikunj Lipscomb APRN   albuterol (PROAIR HFA) 108 (90 Base) MCG/ACT inhaler Inhale 2 puffs Every 4 (Four) Hours As Needed for Wheezing or Shortness of Air. 18  Yes Nikunj Lipscomb APRN   aspirin 81 MG tablet Take 81 mg by mouth.   Yes iGlma Cruz MD   azelastine (ASTELIN) 0.1 % nasal spray 2 sprays into the nostril(s) as directed by provider As Needed for Rhinitis. Use in each nostril as directed   Yes Gilma Cruz MD   Biotin 1000 MCG tablet Take 1,000 mcg by mouth.   Yes Gilma Cruz MD   Biotin w/ Vitamins C & E (HAIR/SKIN/NAILS PO) Take  by mouth.   Yes Gilma Cruz MD   Buprenorphine 20 MCG/HR patch weekly Place 1 patch on the skin 1 (One) Time Per Week. 17  Yes Carlos Mchugh MD   busPIRone (BUSPAR) 7.5 MG tablet Take 7.5 mg by mouth.   Yes Gilma Cruz MD   Calcium Citrate-Vitamin D (CALCIUM CITRATE + D PO) Take 1 tablet by mouth Daily.   Yes  Gilma Cruz MD   COLLAGEN PO Take 1,000 mg by mouth Daily.   Yes Gilma Cruz MD   famotidine (PEPCID) 40 MG tablet Take 1 tablet by mouth Daily. 12/7/18  Yes Nikunj Lipscomb APRN   hydrOXYzine pamoate (VISTARIL) 25 MG capsule Take 1 capsule by mouth every night at bedtime. 1/23/19  Yes Nikunj Lipscomb APRN   lamoTRIgine (LaMICtal) 200 MG tablet Take 200 mg by mouth Daily.   Yes Gilma Cruz MD   lisinopril (PRINIVIL,ZESTRIL) 40 MG tablet Take 40 mg by mouth.   Yes Gilma Cruz MD   loratadine (CLARITIN) 10 MG tablet Take 10 mg by mouth.   Yes Gilma Cruz MD   magnesium oxide (MAGOX) 400 (241.3 MG) MG tablet tablet Take 400 mg by mouth Daily.   Yes Gilma Cruz MD   Melatonin 10 MG capsule Take  by mouth.   Yes Gilma Cruz MD   melatonin 5 MG tablet tablet Take 5 mg by mouth.   Yes Gilma Cruz MD   metoprolol tartrate (LOPRESSOR) 25 MG tablet Take 25 mg by mouth 2 (Two) Times a Day.   Yes Gilma Cruz MD   Milk Thistle 1000 MG capsule Take  by mouth.   Yes Gilma Cruz MD   Multiple Vitamin (MULTIVITAMIN+ PO) Take 1 tablet by mouth Daily.   Yes Gilma Cruz MD   Naloxegol Oxalate (MOVANTIK) 25 MG tablet Take 25 mg by mouth. 8/20/18  Yes Gilma Cruz MD   Niacin, Antihyperlipidemic, 500 MG tablet Take  by mouth.   Yes Gilma Cruz MD   Omega-3 Fatty Acids (OMEGA-3 FISH OIL) 1000 MG capsule Take 1,000 mg by mouth Daily.   Yes Gilma Cruz MD   pregabalin (LYRICA) 150 MG capsule Take 1 capsule by mouth 2 (Two) Times a Day. 8/23/17  Yes Carlos Mchugh MD   psyllium (METAMUCIL SMOOTH TEXTURE) 28 % packet Take 1 packet by mouth 2 (Two) Times a Day. 2/14/19  Yes Jaiyeola, Aderemi, MD   rosuvastatin (CRESTOR) 20 MG tablet Take 20 mg by mouth.   Yes Gilma Cruz MD   topiramate (TOPAMAX) 100 MG tablet Take 50 mg by mouth 2 (Two) Times a Day. 1/2 tablet in am and 1 tablet pm for  headaches.   Yes Gilma Cruz MD   topiramate (TOPAMAX) 50 MG tablet Take 1 tablet by mouth Daily. 12/7/18  Yes Nikunj Lipscomb APRN   traZODone (DESYREL) 150 MG tablet Take 1 tablet by mouth At Night As Needed for Sleep. 12/7/18  Yes Nikunj Lipscomb APRN   vitamin A 01005 UNIT capsule Take 10,000 Units by mouth Daily.   Yes Gilma Cruz MD   vitamin C (ASCORBIC ACID) 250 MG tablet Take 500 mg by mouth.   Yes Gilma Cruz MD   vitamin E 400 UNIT capsule Take 400 Units by mouth Daily.   Yes Gilma Cruz MD   Vortioxetine HBr (TRINTELLIX) 20 MG tablet Take 20 mg by mouth Daily.   Yes Gilma Cruz MD   cyclobenzaprine (FLEXERIL) 5 MG tablet TK 1 T PO BID PRF MSP 2/12/19   Gilma Cruz MD     No Known Allergies  Social History     Socioeconomic History   • Marital status:      Spouse name: Not on file   • Number of children: Not on file   • Years of education: Not on file   • Highest education level: Not on file   Tobacco Use   • Smoking status: Current Every Day Smoker     Packs/day: 1.00     Years: 25.00     Pack years: 25.00     Types: Cigarettes   • Smokeless tobacco: Never Used   Substance and Sexual Activity   • Alcohol use: Yes     Comment: seldom   • Drug use: No   • Sexual activity: Defer       Review of Systems  Review of Systems   Constitutional: Negative for activity change, appetite change, chills, diaphoresis, fatigue, fever and unexpected weight change.   HENT: Negative for sore throat and trouble swallowing.    Respiratory: Negative for shortness of breath.    Gastrointestinal: Positive for abdominal pain (if several days between bowel movements ) and constipation. Negative for abdominal distention, anal bleeding, blood in stool, diarrhea, nausea, rectal pain and vomiting.   Musculoskeletal: Negative for arthralgias.   Skin: Negative for pallor.   Neurological: Negative for light-headedness.        /78 (BP Location: Left arm)    "Pulse 57   Ht 160 cm (63\")   Wt 64.4 kg (142 lb)   BMI 25.15 kg/m²     Objective    Physical Exam   Constitutional: She is oriented to person, place, and time. She appears well-developed and well-nourished. She is cooperative. No distress.   HENT:   Head: Normocephalic and atraumatic.   Neck: Normal range of motion. Neck supple. No thyromegaly present.   Cardiovascular: Normal rate, regular rhythm and normal heart sounds.   Pulmonary/Chest: Effort normal and breath sounds normal. She has no wheezes. She has no rhonchi. She has no rales.   Abdominal: Soft. Normal appearance and bowel sounds are normal. She exhibits no distension. There is no hepatosplenomegaly. There is no tenderness. There is no rigidity and no guarding. No hernia.   Lymphadenopathy:     She has no cervical adenopathy.   Neurological: She is alert and oriented to person, place, and time.   Skin: Skin is warm, dry and intact. No rash noted. No pallor.   Psychiatric: She has a normal mood and affect. Her speech is normal.     Admission on 02/14/2019, Discharged on 02/14/2019   Component Date Value Ref Range Status   • Case Report 02/14/2019    Final                    Value:Surgical Pathology Report                         Case: [id number]                                  Authorizing Provider:  Jaiyeola, Aderemi, MD      Collected:           02/14/2019 09:18 AM          Ordering Location:     [facility]             Received:            02/14/2019 10:36 AM                                 [facility]                                                     Pathologist:           Yinka Mcclelland MD                                                         Specimens:   1) - Large Intestine, Sigmoid Colon, polyps                                                         2) - Large Intestine, Rectum, polyps                                                      • Final Diagnosis 02/14/2019    Final                    Value:This result contains " rich text formatting which cannot be displayed here.   • Gross Description 02/14/2019    Final                    Value:This result contains rich text formatting which cannot be displayed here.     Assessment/Plan      1. Drug-induced constipation    2. Benign neoplasm of sigmoid colon    3. Rectal polyp    .       Orders placed during this encounter include:  No orders of the defined types were placed in this encounter.      * Surgery not found *    Review and/or summary of lab tests, radiology, procedures, medications. Review and summary of old records and obtaining of history. The risks and benefits of my recommendations, as well as other treatment options were discussed with the patient today. Questions were answered.    No orders of the defined types were placed in this encounter.      Follow-up: Return in about 4 weeks (around 3/22/2019).          This document has been electronically signed by ROSEMARY Braden on February 22, 2019 12:51 PM             Results for orders placed or performed during the hospital encounter of 02/14/19   Tissue Pathology Exam   Result Value Ref Range    Case Report       Surgical Pathology Report                         Case: [id number]                                  Authorizing Provider:  Jaiyeola, Aderemi, MD      Collected:           02/14/2019 09:18 AM          Ordering Location:     [facility]             Received:            02/14/2019 10:36 AM                                 [facility]                                                     Pathologist:           Yinka Mcclelland MD                                                         Specimens:   1) - Large Intestine, Sigmoid Colon, polyps                                                         2) - Large Intestine, Rectum, polyps                                                       Final Diagnosis       1.  POLYPS, SIGMOID COLON:   TUBULAR ADENOMA.     2.  POLYPS, RECTUM:   HYPERPLASTIC POLYP.        "Gross Description       1.  The first container is labeled \"polyps, sigmoid colon\" and has nodular bits of white soft tissue measuring 0.6 cc in aggregate.  The entire specimen is embedded as 1A.    2.  The second container is labeled \"polyps, rectum\" and has nodular bits of white soft tissue measuring 0.5 cc in aggregate.  The entire specimen is embedded as 2A.      Results for orders placed or performed in visit on 01/02/19   CBC Auto Differential   Result Value Ref Range    WBC 10.15 (H) 3.20 - 9.80 10*3/mm3    RBC 4.48 3.77 - 5.16 10*6/mm3    Hemoglobin 13.0 12.0 - 15.5 g/dL    Hematocrit 39.2 35.0 - 45.0 %    MCV 87.5 80.0 - 98.0 fL    MCH 29.0 26.5 - 34.0 pg    MCHC 33.2 31.4 - 36.0 g/dL    RDW 14.7 (H) 11.5 - 14.5 %    RDW-SD 47.3 (H) 36.4 - 46.3 fl    MPV 9.3 8.0 - 12.0 fL    Platelets 331 150 - 450 10*3/mm3    Neutrophil % 59.4 37.0 - 80.0 %    Lymphocyte % 29.2 10.0 - 50.0 %    Monocyte % 6.2 0.0 - 12.0 %    Eosinophil % 4.1 0.0 - 7.0 %    Basophil % 0.7 0.0 - 2.0 %    Immature Grans % 0.4 0.0 - 0.5 %    Neutrophils, Absolute 6.03 2.00 - 8.60 10*3/mm3    Lymphocytes, Absolute 2.96 0.60 - 4.20 10*3/mm3    Monocytes, Absolute 0.63 0.00 - 0.90 10*3/mm3    Eosinophils, Absolute 0.42 0.00 - 0.70 10*3/mm3    Basophils, Absolute 0.07 0.00 - 0.20 10*3/mm3    Immature Grans, Absolute 0.04 (H) 0.00 - 0.02 10*3/mm3   TSH   Result Value Ref Range    TSH 0.550 0.460 - 4.680 mIU/mL   Hemoglobin A1c   Result Value Ref Range    Hemoglobin A1C 5.4 4 - 5.6 %   Lipid Panel   Result Value Ref Range    Total Cholesterol 156 0 - 199 mg/dL    Triglycerides 146 20 - 199 mg/dL    HDL Cholesterol 42 (L) 60 - 200 mg/dL    LDL Cholesterol  91 1 - 129 mg/dL    LDL/HDL Ratio 2.02 0.00 - 3.22   Comprehensive Metabolic Panel   Result Value Ref Range    Glucose 94 60 - 100 mg/dL    BUN 16 7 - 21 mg/dL    Creatinine 1.01 (H) 0.50 - 1.00 mg/dL    Sodium 138 137 - 145 mmol/L    Potassium 4.1 3.5 - 5.1 mmol/L    Chloride 105 95 - 110 mmol/L "    CO2 25.0 22.0 - 31.0 mmol/L    Calcium 9.0 8.4 - 10.2 mg/dL    Total Protein 6.7 6.3 - 8.6 g/dL    Albumin 4.30 3.40 - 4.80 g/dL    ALT (SGPT) 14 9 - 52 U/L    AST (SGOT) 22 14 - 36 U/L    Alkaline Phosphatase 84 38 - 126 U/L    Total Bilirubin 0.1 (L) 0.2 - 1.3 mg/dL    eGFR Non  Amer 56 51 - 120 mL/min/1.73    Globulin 2.4 2.3 - 3.5 gm/dL    A/G Ratio 1.8 1.1 - 1.8 g/dL    BUN/Creatinine Ratio 15.8 7.0 - 25.0    Anion Gap 8.0 5.0 - 15.0 mmol/L   Results for orders placed or performed in visit on 04/25/17   ToxASSURE Select 13 (MW)   Result Value Ref Range    Report Summary FINAL     PDF Image .    Results for orders placed or performed in visit on 11/15/16   Pain Management Profile (13 Drugs) Urine   Result Value Ref Range    Pain Management Drug Panel See Below     Codeine, Urine Not Detected     Morphine, Urine Not Detected     6-acetylmorphine Not Detected     Oxycodone Screen, Urine Not Detected     Noroxycodone Not Detected     Oxymorphone UR Not Detected     Noroxymorphone Not Detected     Hydrocodone UR Not Detected     Norhydrocodone Not Detected     Hydromorphone Not Detected     Buprenorphine, Urine Not Detected     Norbuprenorphine Not Detected     Fentanyl, Urine Not Detected     Norfentanyl Not Detected     Normeperidine, Urine Not Detected     Tapentadol Not Detected     Tapentadol-o-Sulf Not Detected     Methadone Screen, Urine Not Detected     Propoxyphene Screen Not Detected     Tramadol Screen, Urine Not Detected     Amphetamine, Urine Qual Not Detected     Methamphetamine, Urine Not Detected     MDMA-Ecstasy Not Detected     MDA Not Detected     MDEA Not Detected     Phentermine Urine Present     Benzoylecgonine, Urine Not Detected     Alprazolam Urine, Conf Not Detected     alpha-Hydroxyalprazolam, Quant, Urine Not Detected     Clonazepam Not Detected     7-Aminoclonazepam Urine Not Detected     Diazepam Urine, Qualitative Not Detected     Nordiazepam, Urine Not Detected     Oxazepam,  urine Not Detected     Temazepam, urine Not Detected     Lorazepam Not Detected     Midazolam, Urine Not Detected     Zolpidem(Ambien) Urine Not Detected     Barbiturates Screen, Urine Not Detected     Ethyl Glucuronide Not Detected     Marijuana Metabolite Not Detected     Phencyclidine (PCP), Urine Not Detected     Carisoprodol, Serum Not Detected     Creatinine, Urine 51.4 20.0 - 400.0 mg/dL    Pain Management Drug Panel See Note     Methylphenidate Not Detected      *Note: Due to a large number of results and/or encounters for the requested time period, some results have not been displayed. A complete set of results can be found in Results Review.

## 2019-02-22 NOTE — PATIENT INSTRUCTIONS
Colon Polyps  Polyps are tissue growths inside the body. Polyps can grow in many places, including the large intestine (colon). A polyp may be a round bump or a mushroom-shaped growth. You could have one polyp or several.  Most colon polyps are noncancerous (benign). However, some colon polyps can become cancerous over time.  What are the causes?  The exact cause of colon polyps is not known.  What increases the risk?  This condition is more likely to develop in people who:  · Have a family history of colon cancer or colon polyps.  · Are older than 50 or older than 45 if they are .  · Have inflammatory bowel disease, such as ulcerative colitis or Crohn disease.  · Are overweight.  · Smoke cigarettes.  · Do not get enough exercise.  · Drink too much alcohol.  · Eat a diet that is:  ? High in fat and red meat.  ? Low in fiber.  · Had childhood cancer that was treated with abdominal radiation.    What are the signs or symptoms?  Most polyps do not cause symptoms. If you have symptoms, they may include:  · Blood coming from your rectum when having a bowel movement.  · Blood in your stool. The stool may look dark red or black.  · A change in bowel habits, such as constipation or diarrhea.    How is this diagnosed?  This condition is diagnosed with a colonoscopy. This is a procedure that uses a lighted, flexible scope to look at the inside of your colon.  How is this treated?  Treatment for this condition involves removing any polyps that are found. Those polyps will then be tested for cancer. If cancer is found, your health care provider will talk to you about options for colon cancer treatment.  Follow these instructions at home:  Diet  · Eat plenty of fiber, such as fruits, vegetables, and whole grains.  · Eat foods that are high in calcium and vitamin D, such as milk, cheese, yogurt, eggs, liver, fish, and broccoli.  · Limit foods high in fat, red meats, and processed meats, such as hot dogs, sausage,  watson, and lunch meats.  · Maintain a healthy weight, or lose weight if recommended by your health care provider.  General instructions  · Do not smoke cigarettes.  · Do not drink alcohol excessively.  · Keep all follow-up visits as told by your health care provider. This is important. This includes keeping regularly scheduled colonoscopies. Talk to your health care provider about when you need a colonoscopy.  · Exercise every day or as told by your health care provider.  Contact a health care provider if:  · You have new or worsening bleeding during a bowel movement.  · You have new or increased blood in your stool.  · You have a change in bowel habits.  · You unexpectedly lose weight.  This information is not intended to replace advice given to you by your health care provider. Make sure you discuss any questions you have with your health care provider.  Document Released: 09/13/2005 Document Revised: 05/25/2017 Document Reviewed: 11/07/2016  [employer] Interactive Patient Education © 2018 [employer]

## 2019-03-07 ENCOUNTER — LAB (OUTPATIENT)
Dept: LAB | Facility: HOSPITAL | Age: 59
End: 2019-03-07

## 2019-03-07 ENCOUNTER — OFFICE VISIT (OUTPATIENT)
Dept: FAMILY MEDICINE CLINIC | Facility: CLINIC | Age: 59
End: 2019-03-07

## 2019-03-07 VITALS
HEIGHT: 63 IN | RESPIRATION RATE: 18 BRPM | HEART RATE: 74 BPM | SYSTOLIC BLOOD PRESSURE: 110 MMHG | WEIGHT: 142.7 LBS | DIASTOLIC BLOOD PRESSURE: 70 MMHG | OXYGEN SATURATION: 98 % | BODY MASS INDEX: 25.29 KG/M2

## 2019-03-07 DIAGNOSIS — R53.83 OTHER FATIGUE: ICD-10-CM

## 2019-03-07 DIAGNOSIS — L65.9 HAIR LOSS: ICD-10-CM

## 2019-03-07 DIAGNOSIS — R53.83 OTHER FATIGUE: Primary | ICD-10-CM

## 2019-03-07 DIAGNOSIS — R79.89 ELEVATED SERUM CREATININE: ICD-10-CM

## 2019-03-07 LAB
25(OH)D3 SERPL-MCNC: 37.1 NG/ML (ref 30–100)
BASOPHILS # BLD AUTO: 0.1 10*3/MM3 (ref 0–0.2)
BASOPHILS NFR BLD AUTO: 0.8 % (ref 0–1.5)
CREAT BLD-MCNC: 1.07 MG/DL (ref 0.5–1)
DEPRECATED RDW RBC AUTO: 47.2 FL (ref 37–54)
EOSINOPHIL # BLD AUTO: 0.15 10*3/MM3 (ref 0–0.4)
EOSINOPHIL NFR BLD AUTO: 1.1 % (ref 0.3–6.2)
ERYTHROCYTE [DISTWIDTH] IN BLOOD BY AUTOMATED COUNT: 14.8 % (ref 12.3–15.4)
FERRITIN SERPL-MCNC: 8.1 NG/ML (ref 11.1–264)
GFR SERPL CREATININE-BSD FRML MDRD: 53 ML/MIN/1.73 (ref 51–120)
HCT VFR BLD AUTO: 41.1 % (ref 34–46.6)
HGB BLD-MCNC: 13.2 G/DL (ref 12–15.9)
IMM GRANULOCYTES # BLD AUTO: 0.06 10*3/MM3 (ref 0–0.05)
IMM GRANULOCYTES NFR BLD AUTO: 0.5 % (ref 0–0.5)
IRON 24H UR-MRATE: 36 MCG/DL (ref 37–170)
IRON SATN MFR SERPL: 9 % (ref 15–50)
LYMPHOCYTES # BLD AUTO: 2.96 10*3/MM3 (ref 0.7–3.1)
LYMPHOCYTES NFR BLD AUTO: 22.3 % (ref 19.6–45.3)
MCH RBC QN AUTO: 28.3 PG (ref 26.6–33)
MCHC RBC AUTO-ENTMCNC: 32.1 G/DL (ref 31.5–35.7)
MCV RBC AUTO: 88.2 FL (ref 79–97)
MONOCYTES # BLD AUTO: 0.75 10*3/MM3 (ref 0.1–0.9)
MONOCYTES NFR BLD AUTO: 5.7 % (ref 5–12)
NEUTROPHILS # BLD AUTO: 9.24 10*3/MM3 (ref 1.4–7)
NEUTROPHILS NFR BLD AUTO: 69.6 % (ref 42.7–76)
NRBC BLD AUTO-RTO: 0 /100 WBC (ref 0–0)
PLATELET # BLD AUTO: 332 10*3/MM3 (ref 140–450)
PMV BLD AUTO: 9.9 FL (ref 6–12)
RBC # BLD AUTO: 4.66 10*6/MM3 (ref 3.77–5.28)
TIBC SERPL-MCNC: 394 MCG/DL (ref 265–497)
VIT B12 BLD-MCNC: 356 PG/ML (ref 239–931)
WBC NRBC COR # BLD: 13.26 10*3/MM3 (ref 3.4–10.8)

## 2019-03-07 PROCEDURE — 82306 VITAMIN D 25 HYDROXY: CPT

## 2019-03-07 PROCEDURE — 99213 OFFICE O/P EST LOW 20 MIN: CPT | Performed by: NURSE PRACTITIONER

## 2019-03-07 PROCEDURE — 82607 VITAMIN B-12: CPT

## 2019-03-07 PROCEDURE — 85025 COMPLETE CBC W/AUTO DIFF WBC: CPT

## 2019-03-07 PROCEDURE — 83550 IRON BINDING TEST: CPT

## 2019-03-07 PROCEDURE — 36415 COLL VENOUS BLD VENIPUNCTURE: CPT

## 2019-03-07 PROCEDURE — 83540 ASSAY OF IRON: CPT

## 2019-03-07 PROCEDURE — 82728 ASSAY OF FERRITIN: CPT

## 2019-03-07 PROCEDURE — 82565 ASSAY OF CREATININE: CPT

## 2019-03-07 RX ORDER — CHOLECALCIFEROL (VITAMIN D3) 25 MCG
1000 CAPSULE ORAL DAILY
Qty: 30 CAPSULE | Refills: 5 | Status: SHIPPED | OUTPATIENT
Start: 2019-03-07 | End: 2019-04-06

## 2019-03-07 NOTE — PROGRESS NOTES
Subjective   Ga Mendoza is a 58 y.o. female.     Ms. Mendoza is a 58-year-old female who presents today with reports of fatigue and excessive hair loss.  She denies any new medications or recent illnesses.  She denies chest pain, shortness of breath, palpitations, edema.  She does report a history of B12 deficiency but currently takes no medication.  She does take biotin 1000 mcg p.o. daily and vitamin D3 1000 mg p.o. daily.  She denies any signs of bleeding such as dark stools or excessive bruising.         The following portions of the patient's history were reviewed and updated as appropriate: allergies, current medications, past family history, past medical history, past social history, past surgical history and problem list.    Review of Systems   Constitutional: Positive for fatigue. Negative for activity change, appetite change, unexpected weight gain and unexpected weight loss.   HENT: Negative for congestion, sore throat, trouble swallowing and voice change.    Eyes: Negative.    Respiratory: Negative for cough, chest tightness, shortness of breath and wheezing.    Cardiovascular: Negative for chest pain, palpitations and leg swelling.   Gastrointestinal: Negative for abdominal pain, constipation, diarrhea, nausea and vomiting.   Endocrine: Negative.    Genitourinary: Negative for dysuria.   Musculoskeletal: Negative for arthralgias and myalgias.   Skin: Negative for rash.        Reports excessive hair loss.   Allergic/Immunologic: Negative.    Neurological: Negative.    Hematological: Negative.    Psychiatric/Behavioral: Negative.        Objective   Physical Exam   Constitutional: She is oriented to person, place, and time. She appears well-developed and well-nourished. No distress.   HENT:   Head: Normocephalic and atraumatic.   Eyes: Conjunctivae are normal.   Neck: Normal range of motion. No thyromegaly present.   Cardiovascular: Normal rate, regular rhythm, normal heart sounds and intact  distal pulses.   Pulmonary/Chest: Effort normal and breath sounds normal. No stridor. No respiratory distress. She has no wheezes. She has no rales.   Musculoskeletal: Normal range of motion. She exhibits no edema or tenderness.   Neurological: She is alert and oriented to person, place, and time.   Skin: Skin is warm and dry. No rash noted. She is not diaphoretic. No erythema. No pallor.   No excessive hair loss or balding spots noted.   Psychiatric: She has a normal mood and affect. Her behavior is normal. Judgment and thought content normal.   Nursing note and vitals reviewed.        Assessment/Plan   Ga was seen today for follow-up, fatigue and other.    Diagnoses and all orders for this visit:    Other fatigue  -     Vitamin B12; Future  -     Ferritin; Future  -     Vitamin D 25 Hydroxy; Future  -     CBC & Differential; Future  -     Iron Profile; Future    Hair loss  -     Vitamin B12; Future  -     Ferritin; Future  -     Vitamin D 25 Hydroxy; Future  -     CBC & Differential; Future  -     Iron Profile; Future    Other orders  -     Cholecalciferol (VITAMIN D-3) 1000 units capsule; Take 1,000 Units by mouth Daily for 30 days.    1.  Fatigue-vitamin B12, ferritin, vitamin D, CBC, iron profile.  Will call with results.  Refill vitamin D3 1000 units 1 capsule p.o. daily.    2.  Hair loss- routine labs as stated above.  Will call with results.    3.  Follow-up in 3 months or sooner if needed.            This document has been electronically signed by ROSEMARY Mccabe on March 7, 2019 4:19 PM

## 2019-03-08 RX ORDER — FERROUS SULFATE 325(65) MG
325 TABLET ORAL 2 TIMES DAILY
Qty: 60 TABLET | Refills: 5 | Status: SHIPPED | OUTPATIENT
Start: 2019-03-08 | End: 2019-06-05 | Stop reason: SDUPTHER

## 2019-03-08 RX ORDER — ASCORBIC ACID 500 MG
500 TABLET ORAL DAILY
Qty: 30 TABLET | Refills: 3 | Status: SHIPPED | OUTPATIENT
Start: 2019-03-08 | End: 2019-04-07

## 2019-03-08 RX ORDER — LANOLIN ALCOHOL/MO/W.PET/CERES
1000 CREAM (GRAM) TOPICAL DAILY
Qty: 30 TABLET | Refills: 3 | Status: SHIPPED | OUTPATIENT
Start: 2019-03-08 | End: 2019-04-07

## 2019-03-08 NOTE — PROGRESS NOTES
Then, iron and iron saturation low.  I am starting her on an iron supplement twice a day along with daily vitamin C.  Her vitamin B12 was within normal limits but at the low end.  So I started her on a daily vitamin B12 supplement as well. Follow up 3 months.

## 2019-06-04 ENCOUNTER — LAB (OUTPATIENT)
Dept: LAB | Facility: HOSPITAL | Age: 59
End: 2019-06-04

## 2019-06-04 ENCOUNTER — OFFICE VISIT (OUTPATIENT)
Dept: FAMILY MEDICINE CLINIC | Facility: CLINIC | Age: 59
End: 2019-06-04

## 2019-06-04 VITALS
DIASTOLIC BLOOD PRESSURE: 74 MMHG | OXYGEN SATURATION: 98 % | HEIGHT: 63 IN | WEIGHT: 144.6 LBS | SYSTOLIC BLOOD PRESSURE: 120 MMHG | BODY MASS INDEX: 25.62 KG/M2 | HEART RATE: 68 BPM | RESPIRATION RATE: 18 BRPM

## 2019-06-04 DIAGNOSIS — Z87.898 HISTORY OF BRAIN TUMOR: ICD-10-CM

## 2019-06-04 DIAGNOSIS — G62.9 NEUROPATHY: ICD-10-CM

## 2019-06-04 DIAGNOSIS — E61.1 IRON DEFICIENCY: Primary | ICD-10-CM

## 2019-06-04 DIAGNOSIS — E61.1 IRON DEFICIENCY: ICD-10-CM

## 2019-06-04 DIAGNOSIS — R53.83 OTHER FATIGUE: ICD-10-CM

## 2019-06-04 DIAGNOSIS — R68.89 FORGETFULNESS: ICD-10-CM

## 2019-06-04 DIAGNOSIS — R26.89 BALANCE PROBLEM: ICD-10-CM

## 2019-06-04 PROCEDURE — 36415 COLL VENOUS BLD VENIPUNCTURE: CPT

## 2019-06-04 PROCEDURE — 83540 ASSAY OF IRON: CPT

## 2019-06-04 PROCEDURE — 82607 VITAMIN B-12: CPT

## 2019-06-04 PROCEDURE — 85025 COMPLETE CBC W/AUTO DIFF WBC: CPT

## 2019-06-04 PROCEDURE — 99214 OFFICE O/P EST MOD 30 MIN: CPT | Performed by: NURSE PRACTITIONER

## 2019-06-04 PROCEDURE — 84466 ASSAY OF TRANSFERRIN: CPT

## 2019-06-04 PROCEDURE — 82728 ASSAY OF FERRITIN: CPT

## 2019-06-04 RX ORDER — PREGABALIN 200 MG/1
200 CAPSULE ORAL 2 TIMES DAILY
COMMUNITY
Start: 2019-03-25

## 2019-06-04 RX ORDER — DOXEPIN HYDROCHLORIDE 50 MG/G
CREAM TOPICAL
Refills: 12 | COMMUNITY
Start: 2019-05-17 | End: 2020-04-24

## 2019-06-04 RX ORDER — AMITRIPTYLINE HYDROCHLORIDE 50 MG/1
50 TABLET, FILM COATED ORAL NIGHTLY
Qty: 30 TABLET | Refills: 1 | Status: SHIPPED | OUTPATIENT
Start: 2019-06-04 | End: 2019-07-08

## 2019-06-04 RX ORDER — LANOLIN ALCOHOL/MO/W.PET/CERES
CREAM (GRAM) TOPICAL
Refills: 3 | COMMUNITY
Start: 2019-04-10

## 2019-06-04 NOTE — PROGRESS NOTES
Subjective   Ga Mendoza is a 58 y.o. female.     Ms. Mcmillan 58-year-old female presents today for follow-up related to iron deficiency, neuropathy and fatigue.  She has been taking iron supplementation p.o. twice daily for 3 months now.  She is reported no major change in her fatigue symptoms.  She is also reported within the last month and onset of balance issues forgetfulness.  She reports having a benign brain tumor.  Furthermore she states is been years since she seen a neurologist or specialist.  She has no symptoms at present time.         The following portions of the patient's history were reviewed and updated as appropriate: allergies, current medications, past family history, past medical history, past social history, past surgical history and problem list.    Review of Systems   Constitutional: Positive for fatigue. Negative for activity change, appetite change, unexpected weight gain and unexpected weight loss.   HENT: Negative for congestion, sore throat, trouble swallowing and voice change.    Eyes: Negative.    Respiratory: Negative for cough, chest tightness, shortness of breath and wheezing.    Cardiovascular: Negative for chest pain, palpitations and leg swelling.   Gastrointestinal: Negative for abdominal pain, constipation, diarrhea, nausea and vomiting.   Endocrine: Negative.    Genitourinary: Negative for dysuria.   Musculoskeletal: Negative for arthralgias and myalgias.        Reports current neuropathy pain to her bilateral feet is not well controlled.   Skin: Negative for rash.   Allergic/Immunologic: Negative.    Neurological: Positive for memory problem.        Reported balance issues.   Hematological: Negative.    Psychiatric/Behavioral: Negative.        Objective   Physical Exam   Constitutional: She is oriented to person, place, and time. She appears well-developed and well-nourished. No distress.   HENT:   Head: Normocephalic and atraumatic.   Right Ear: External ear normal.    Left Ear: External ear normal.   Nose: Nose normal.   Mouth/Throat: Oropharynx is clear and moist.   Eyes: Conjunctivae and EOM are normal. Pupils are equal, round, and reactive to light.   Neck: Normal range of motion.   Cardiovascular: Normal rate, regular rhythm and normal heart sounds.   Pulmonary/Chest: Effort normal and breath sounds normal. No respiratory distress. She has no wheezes. She has no rales.   Musculoskeletal: Normal range of motion. She exhibits no edema or tenderness.   Neurological: She is alert and oriented to person, place, and time. She is not disoriented. She displays no atrophy and no tremor. No cranial nerve deficit or sensory deficit. She exhibits normal muscle tone. She displays no seizure activity. Coordination and gait normal.   Skin: Skin is warm and dry. No rash noted. She is not diaphoretic. No erythema. No pallor.   Psychiatric: She has a normal mood and affect. Her behavior is normal. Judgment and thought content normal.   Nursing note and vitals reviewed.        Assessment/Plan   Ga was seen today for follow-up.    Diagnoses and all orders for this visit:    Iron deficiency  -     Iron Profile; Future  -     Vitamin B12; Future  -     Ferritin; Future  -     CBC & Differential; Future    Other fatigue  -     Iron Profile; Future  -     Vitamin B12; Future  -     Ferritin; Future  -     CBC & Differential; Future    Balance problem  -     CT Head Without Contrast    Forgetfulness  -     CT Head Without Contrast    History of brain tumor  -     CT Head Without Contrast    Neuropathy    Other orders  -     amitriptyline (ELAVIL) 50 MG tablet; Take 1 tablet by mouth Every Night.      1.  Iron deficiency-iron profile, vitamin B12, ferritin, CBC.  Will call with results.    2.  Fatigue- labs as stated above.  Will call with results.    3.  Balance problem-CT of the head without contrast.  Will call with results.    4.  Forgetfulness-CT the head without contrast.  Will call with  results.    5.  Neuropathy-discontinue trazodone.  Patient agrees to trial of Elavil 50 mg 1 tablet p.o. nightly.  Patient to stop if anxiety or depression worsen.  Patient to presents the emergency room if she develop suicidal homicidal ideations.  Patient verbalized understanding of instruction.    6.  Follow-up in 1 month or sooner if needed.            This document has been electronically signed by ROSEMARY Mccabe on June 4, 2019 3:58 PM

## 2019-06-05 LAB
BASOPHILS # BLD AUTO: 0.11 10*3/MM3 (ref 0–0.2)
BASOPHILS NFR BLD AUTO: 1 % (ref 0–1.5)
DEPRECATED RDW RBC AUTO: 47.8 FL (ref 37–54)
EOSINOPHIL # BLD AUTO: 0.19 10*3/MM3 (ref 0–0.4)
EOSINOPHIL NFR BLD AUTO: 1.7 % (ref 0.3–6.2)
ERYTHROCYTE [DISTWIDTH] IN BLOOD BY AUTOMATED COUNT: 14.3 % (ref 12.3–15.4)
FERRITIN SERPL-MCNC: 11.3 NG/ML (ref 13–150)
HCT VFR BLD AUTO: 42.9 % (ref 34–46.6)
HGB BLD-MCNC: 13.5 G/DL (ref 12–15.9)
IMM GRANULOCYTES # BLD AUTO: 0.04 10*3/MM3 (ref 0–0.05)
IMM GRANULOCYTES NFR BLD AUTO: 0.4 % (ref 0–0.5)
IRON 24H UR-MRATE: 37 MCG/DL (ref 37–145)
IRON SATN MFR SERPL: 8 % (ref 20–50)
LYMPHOCYTES # BLD AUTO: 3.15 10*3/MM3 (ref 0.7–3.1)
LYMPHOCYTES NFR BLD AUTO: 27.8 % (ref 19.6–45.3)
MCH RBC QN AUTO: 28.5 PG (ref 26.6–33)
MCHC RBC AUTO-ENTMCNC: 31.5 G/DL (ref 31.5–35.7)
MCV RBC AUTO: 90.7 FL (ref 79–97)
MONOCYTES # BLD AUTO: 0.65 10*3/MM3 (ref 0.1–0.9)
MONOCYTES NFR BLD AUTO: 5.7 % (ref 5–12)
NEUTROPHILS # BLD AUTO: 7.21 10*3/MM3 (ref 1.7–7)
NEUTROPHILS NFR BLD AUTO: 63.4 % (ref 42.7–76)
NRBC BLD AUTO-RTO: 0 /100 WBC (ref 0–0.2)
PLATELET # BLD AUTO: 387 10*3/MM3 (ref 140–450)
PMV BLD AUTO: 9.9 FL (ref 6–12)
RBC # BLD AUTO: 4.73 10*6/MM3 (ref 3.77–5.28)
TIBC SERPL-MCNC: 483 MCG/DL (ref 298–536)
TRANSFERRIN SERPL-MCNC: 324 MG/DL (ref 200–360)
VIT B12 BLD-MCNC: 353 PG/ML (ref 211–946)
WBC NRBC COR # BLD: 11.35 10*3/MM3 (ref 3.4–10.8)

## 2019-06-05 RX ORDER — MULTIVIT WITH MINERALS/LUTEIN
250 TABLET ORAL 2 TIMES DAILY
Qty: 60 TABLET | Refills: 3 | Status: SHIPPED | OUTPATIENT
Start: 2019-06-05 | End: 2019-07-05

## 2019-06-05 RX ORDER — FERROUS SULFATE 325(65) MG
325 TABLET ORAL
Qty: 90 TABLET | Refills: 3 | Status: SHIPPED | OUTPATIENT
Start: 2019-06-05 | End: 2019-07-05

## 2019-06-05 NOTE — PROGRESS NOTES
Very little change in iron levels.  I sent in a new prescription for ferrous sulfate and increased it to 3 times daily.  I am not sure if she still taking vitamin C but I sent in a new prescription that I want her to take twice daily with the iron.  Instruct her not to take any antacids or reflux medicine such as her Pepcid within 2 hours of taking the iron.  We will reevaluate in 3 months.

## 2019-06-07 ENCOUNTER — TELEPHONE (OUTPATIENT)
Dept: FAMILY MEDICINE CLINIC | Facility: CLINIC | Age: 59
End: 2019-06-07

## 2019-06-07 RX ORDER — TOPIRAMATE 50 MG/1
50 TABLET, FILM COATED ORAL DAILY
Qty: 90 TABLET | Refills: 2 | Status: SHIPPED | OUTPATIENT
Start: 2019-06-07 | End: 2019-06-25 | Stop reason: SDUPTHER

## 2019-06-07 RX ORDER — LISINOPRIL 40 MG/1
40 TABLET ORAL DAILY
Qty: 90 TABLET | Refills: 3 | Status: SHIPPED | OUTPATIENT
Start: 2019-06-07 | End: 2020-04-06 | Stop reason: SDUPTHER

## 2019-06-07 RX ORDER — ROSUVASTATIN CALCIUM 20 MG/1
20 TABLET, COATED ORAL DAILY
Qty: 90 TABLET | Refills: 3 | Status: SHIPPED | OUTPATIENT
Start: 2019-06-07 | End: 2020-04-06 | Stop reason: SDUPTHER

## 2019-06-07 NOTE — TELEPHONE ENCOUNTER
Mikhail,    I refilled all except the topiramate. Can you take a look at that one?    Thanks so much

## 2019-06-07 NOTE — TELEPHONE ENCOUNTER
MOISÉS BUTLER IS NEEDING 90DAY PRESPS TO BE SENT TO [facility] FOR:  METOPROLOL 25MG  ROSUVASTATIN 20MG  LISINOPRIL 40MG  TOPIRAMATE 50MG  TOPIRAMATE 100MG  BUSPAR 7.5MG  TRINTELLIX 20MG  CALLBACK# [phone]

## 2019-06-13 ENCOUNTER — HOSPITAL ENCOUNTER (OUTPATIENT)
Dept: CT IMAGING | Facility: HOSPITAL | Age: 59
Discharge: HOME | End: 2019-06-13
Admitting: NURSE PRACTITIONER

## 2019-06-13 PROCEDURE — 70450 CT HEAD/BRAIN W/O DYE: CPT

## 2019-06-25 ENCOUNTER — TELEPHONE (OUTPATIENT)
Dept: FAMILY MEDICINE CLINIC | Facility: CLINIC | Age: 59
End: 2019-06-25

## 2019-06-25 RX ORDER — TOPIRAMATE 50 MG/1
50 TABLET, FILM COATED ORAL DAILY
Qty: 90 TABLET | Refills: 2 | Status: SHIPPED | OUTPATIENT
Start: 2019-06-25 | End: 2019-12-26

## 2019-06-25 RX ORDER — TOPIRAMATE 100 MG/1
100 TABLET, FILM COATED ORAL EVERY EVENING
Qty: 90 TABLET | Refills: 2 | Status: SHIPPED | OUTPATIENT
Start: 2019-06-25 | End: 2020-05-21

## 2019-06-25 NOTE — TELEPHONE ENCOUNTER
I found an old prescription from another provider dosed this way however ever since she has see me it has been 50 mg.  We can call in a prescription for the 100 mg p.o. nightly in addition to her 50 mg p.o. daily.

## 2019-06-25 NOTE — TELEPHONE ENCOUNTER
Mikhail,    I refilled her topamax 50 mg.  I did not find note on her taking 100 mg nightly.     Please advise,  Thanks so much.

## 2019-06-25 NOTE — TELEPHONE ENCOUNTER
MOISÉS BUTLER HAD ASKED FOR A 90DAY PRESP FOR HER TOPAMAX 50MG TO BE SENT TO [facility]  SHE ALSO TAKES A 100MG TOPAMAX IN PM..PLEASE SEND THAT ALSO  HER # [phone] IF QUESTIONS

## 2019-07-08 ENCOUNTER — OFFICE VISIT (OUTPATIENT)
Dept: FAMILY MEDICINE CLINIC | Facility: CLINIC | Age: 59
End: 2019-07-08

## 2019-07-08 VITALS
BODY MASS INDEX: 24.82 KG/M2 | OXYGEN SATURATION: 98 % | HEART RATE: 76 BPM | SYSTOLIC BLOOD PRESSURE: 100 MMHG | DIASTOLIC BLOOD PRESSURE: 72 MMHG | HEIGHT: 63 IN | WEIGHT: 140.1 LBS | RESPIRATION RATE: 18 BRPM

## 2019-07-08 DIAGNOSIS — G62.9 NEUROPATHY: Primary | ICD-10-CM

## 2019-07-08 DIAGNOSIS — H92.01 OTALGIA OF RIGHT EAR: ICD-10-CM

## 2019-07-08 DIAGNOSIS — G47.00 INSOMNIA, UNSPECIFIED TYPE: ICD-10-CM

## 2019-07-08 DIAGNOSIS — R21 RASH OF HANDS: ICD-10-CM

## 2019-07-08 PROCEDURE — 99214 OFFICE O/P EST MOD 30 MIN: CPT | Performed by: NURSE PRACTITIONER

## 2019-07-08 RX ORDER — AMITRIPTYLINE HYDROCHLORIDE 75 MG/1
75 TABLET, FILM COATED ORAL NIGHTLY
Qty: 90 TABLET | Refills: 2 | Status: SHIPPED | OUTPATIENT
Start: 2019-07-08 | End: 2020-04-06 | Stop reason: SDUPTHER

## 2019-07-08 RX ORDER — PREDNISONE 20 MG/1
20 TABLET ORAL 2 TIMES DAILY
Qty: 10 TABLET | Refills: 0 | Status: SHIPPED | OUTPATIENT
Start: 2019-07-08 | End: 2019-07-13

## 2019-07-08 RX ORDER — FLUTICASONE PROPIONATE 50 MCG
2 SPRAY, SUSPENSION (ML) NASAL DAILY
Qty: 18.2 ML | Refills: 0 | Status: SHIPPED | OUTPATIENT
Start: 2019-07-08 | End: 2020-04-06 | Stop reason: SDUPTHER

## 2019-07-08 RX ORDER — TRIAMCINOLONE ACETONIDE 1 MG/G
CREAM TOPICAL 2 TIMES DAILY
Qty: 60 G | Refills: 0 | Status: SHIPPED | OUTPATIENT
Start: 2019-07-08 | End: 2021-06-11

## 2019-07-08 RX ORDER — BUSPIRONE HYDROCHLORIDE 7.5 MG/1
7.5 TABLET ORAL 3 TIMES DAILY
Qty: 270 TABLET | Refills: 2 | Status: SHIPPED | OUTPATIENT
Start: 2019-07-08 | End: 2019-10-06

## 2019-07-08 NOTE — PROGRESS NOTES
Subjective   Ga Mendoza is a 58 y.o. female.     Ms. Mcmillan 58-year-old female who presents today for follow-up related to bilateral lower extremity neuropathy, insomnia.  Patient was started on amitriptyline 50 mg p.o. nightly for the management of insomnia and neuropathy.  Her insomnia has slightly improved but she does not sleep all night.  She has no side effects to amitriptyline.  She denies suicidal homicidal ideations.  She has noticed no decrease in her neuropathy pain since starting amitriptyline.    Ms. Mendoza also reports right ear pain and rash to her right hand.  She states she has seasonal allergies and is believes her ear pain is related to this.  She denies fever, chills or URI symptoms.  She denies any discharge from her ear.  She reports she gets intermittent rash to her right hand specifically to her fourth and fifth digits at the base of her fingers.  She has tried antibiotic ointments which provided no relief.  Rashes intermittent but recurrent in nature.  She denies any pain.  She states rash is itching in nature.         The following portions of the patient's history were reviewed and updated as appropriate: allergies, current medications, past family history, past medical history, past social history, past surgical history and problem list.    Review of Systems   Constitutional: Negative for activity change, appetite change, fatigue, unexpected weight gain and unexpected weight loss.   HENT: Positive for ear pain (right ear). Negative for congestion, ear discharge, sore throat, trouble swallowing and voice change.    Eyes: Negative.    Respiratory: Negative for cough, chest tightness, shortness of breath and wheezing.    Cardiovascular: Negative for chest pain, palpitations and leg swelling.   Gastrointestinal: Negative for abdominal pain, constipation, diarrhea, nausea and vomiting.   Endocrine: Negative.    Genitourinary: Negative for dysuria.   Musculoskeletal: Negative for  arthralgias and myalgias.        BLE neuropathy   Skin: Positive for rash (right hand at base of 4th and 5th fingers).   Allergic/Immunologic: Negative.    Neurological: Negative.    Hematological: Negative.    Psychiatric/Behavioral: Positive for sleep disturbance (improving but still has difficultly sleeping all night).       Objective   Physical Exam   Constitutional: She is oriented to person, place, and time. She appears well-developed and well-nourished. No distress.   HENT:   Head: Normocephalic and atraumatic.   Right Ear: External ear normal.   Left Ear: External ear normal.   Nose: Nose normal.   Mouth/Throat: Oropharynx is clear and moist.   Eyes: Conjunctivae are normal.   Neck: Normal range of motion.   Cardiovascular: Normal rate, regular rhythm and normal heart sounds.   Pulmonary/Chest: Effort normal and breath sounds normal. No stridor. No respiratory distress. She has no wheezes. She has no rales.   Musculoskeletal: Normal range of motion. She exhibits no edema or tenderness.   Neurological: She is alert and oriented to person, place, and time.   Skin: Skin is warm and dry. No rash noted. She is not diaphoretic. There is erythema. No pallor.        Psychiatric: She has a normal mood and affect. Her speech is normal and behavior is normal. Judgment and thought content normal. She is not actively hallucinating. Thought content is not paranoid and not delusional. Cognition and memory are normal. She expresses no homicidal and no suicidal ideation. She expresses no suicidal plans and no homicidal plans. She is attentive.   Nursing note and vitals reviewed.        Assessment/Plan   Ga was seen today for follow-up and earache.    Diagnoses and all orders for this visit:    Neuropathy    Otalgia of right ear    Insomnia, unspecified type    Rash of hands    Other orders  -     fluticasone (FLONASE) 50 MCG/ACT nasal spray; 2 sprays into the nostril(s) as directed by provider Daily. Administer 2 sprays  in each nostril for each dose.  -     predniSONE (DELTASONE) 20 MG tablet; Take 1 tablet by mouth 2 (Two) Times a Day for 5 days.  -     triamcinolone (KENALOG) 0.1 % cream; Apply  topically to the appropriate area as directed 2 (Two) Times a Day.  -     amitriptyline (ELAVIL) 75 MG tablet; Take 1 tablet by mouth Every Night.  -     busPIRone (BUSPAR) 7.5 MG tablet; Take 1 tablet by mouth 3 (Three) Times a Day for 90 days.  -     Iron, Ferrous Gluconate, 256 (28 Fe) MG tablet; Take 1 tablet by mouth 2 (Two) Times a Day for 90 days.    1.  Neuropathy- increase Elavil to 75 mg 1 tablet p.o. nightly.  Patient instructed to stop medication if she develops increased anxiety or depression.  Patient instructed to present to emergency room she develop suicidal or homicidal ideations.  Patient verbalized understanding of instruction.    2.  Insomnia- increase Elavil to 75 mg 1 tablet p.o. nightly.  Instructions as stated above.    3.  Otalgia right ear- restart Zyrtec 10 mg 1 tablet p.o. daily.  Prednisone 20 mg 1 tablet twice a day for 5 days.  Flonase 50 MCG/ACT 2 sprays each nostril daily for 7 days.    4.  Rash of right hand- Kenalog 0.1% cream apply topically twice daily as needed.    5.  Follow-up in 3 months or sooner if needed.            This document has been electronically signed by ROSEMARY Mccabe on July 8, 2019 4:23 PM

## 2019-07-19 ENCOUNTER — TELEPHONE (OUTPATIENT)
Dept: FAMILY MEDICINE CLINIC | Facility: CLINIC | Age: 59
End: 2019-07-19

## 2019-07-22 RX ORDER — DOXYCYCLINE HYCLATE 50 MG/1
324 CAPSULE, GELATIN COATED ORAL 2 TIMES DAILY
Qty: 90 TABLET | Refills: 0 | Status: SHIPPED | OUTPATIENT
Start: 2019-07-22 | End: 2019-08-21

## 2019-08-07 RX ORDER — AMITRIPTYLINE HYDROCHLORIDE 50 MG/1
TABLET, FILM COATED ORAL
Qty: 30 TABLET | Refills: 1 | OUTPATIENT
Start: 2019-08-07

## 2019-09-25 RX ORDER — TRAZODONE HYDROCHLORIDE 150 MG/1
TABLET ORAL
Qty: 90 TABLET | Refills: 2 | OUTPATIENT
Start: 2019-09-25

## 2019-10-14 ENCOUNTER — LAB (OUTPATIENT)
Dept: LAB | Facility: HOSPITAL | Age: 59
End: 2019-10-14

## 2019-10-14 ENCOUNTER — OFFICE VISIT (OUTPATIENT)
Dept: FAMILY MEDICINE CLINIC | Facility: CLINIC | Age: 59
End: 2019-10-14

## 2019-10-14 VITALS
HEART RATE: 61 BPM | DIASTOLIC BLOOD PRESSURE: 80 MMHG | RESPIRATION RATE: 18 BRPM | OXYGEN SATURATION: 98 % | BODY MASS INDEX: 25.99 KG/M2 | WEIGHT: 146.7 LBS | HEIGHT: 63 IN | SYSTOLIC BLOOD PRESSURE: 122 MMHG

## 2019-10-14 DIAGNOSIS — E61.1 IRON DEFICIENCY: ICD-10-CM

## 2019-10-14 DIAGNOSIS — I10 ESSENTIAL HYPERTENSION: Primary | ICD-10-CM

## 2019-10-14 DIAGNOSIS — M54.2 NECK PAIN, MUSCULOSKELETAL: ICD-10-CM

## 2019-10-14 DIAGNOSIS — G43.909 MIGRAINE WITHOUT STATUS MIGRAINOSUS, NOT INTRACTABLE, UNSPECIFIED MIGRAINE TYPE: ICD-10-CM

## 2019-10-14 PROCEDURE — 83540 ASSAY OF IRON: CPT

## 2019-10-14 PROCEDURE — 99214 OFFICE O/P EST MOD 30 MIN: CPT | Performed by: NURSE PRACTITIONER

## 2019-10-14 PROCEDURE — 84466 ASSAY OF TRANSFERRIN: CPT

## 2019-10-14 PROCEDURE — 85025 COMPLETE CBC W/AUTO DIFF WBC: CPT

## 2019-10-14 PROCEDURE — 82728 ASSAY OF FERRITIN: CPT

## 2019-10-14 PROCEDURE — 36415 COLL VENOUS BLD VENIPUNCTURE: CPT

## 2019-10-14 PROCEDURE — 82607 VITAMIN B-12: CPT

## 2019-10-14 RX ORDER — PNV NO.95/FERROUS FUM/FOLIC AC 28MG-0.8MG
1 TABLET ORAL
Refills: 3 | COMMUNITY
Start: 2019-08-26 | End: 2020-04-06 | Stop reason: ALTCHOICE

## 2019-10-14 RX ORDER — CYCLOBENZAPRINE HCL 10 MG
10 TABLET ORAL 3 TIMES DAILY PRN
Qty: 90 TABLET | Refills: 2 | Status: SHIPPED | OUTPATIENT
Start: 2019-10-14 | End: 2020-01-30

## 2019-10-14 NOTE — PROGRESS NOTES
Subjective   Ga Mendoza is a 58 y.o. female.     Ms. Mendoza is a 58-year-old female who presents today for follow-up related to hypertension, migraine headache, iron deficiency anemia and for evaluation of musculoskeletal neck pain.  Blood pressure today is 122/80.  She denies chest pain, shortness of breath or palpitations.  She denies any increased frequency or worsening of headache symptoms.  She denies neurologic deficits.  Patient continues to take oral iron for the management of iron deficiency.  She denies weakness fatigue or signs of bleeding.  Patient also reports within the last week she has had neck pain near the base of her skull.  She does report being slightly more active than normal as she is cleaning out her house preparing for it to be sold.  She denies any pain shooting down her arms or neurologic deficits.         The following portions of the patient's history were reviewed and updated as appropriate: allergies, current medications, past family history, past medical history, past social history, past surgical history and problem list.    Review of Systems   Constitutional: Negative for activity change, appetite change, fatigue, unexpected weight gain and unexpected weight loss.   HENT: Negative for congestion, sore throat, trouble swallowing and voice change.    Eyes: Negative.    Respiratory: Negative for cough, chest tightness, shortness of breath and wheezing.    Cardiovascular: Negative for chest pain, palpitations and leg swelling.   Gastrointestinal: Negative for abdominal pain, constipation, diarrhea, nausea and vomiting.   Endocrine: Negative.    Genitourinary: Negative for dysuria.   Musculoskeletal: Positive for neck pain. Negative for arthralgias, myalgias and neck stiffness.   Skin: Negative for rash.   Allergic/Immunologic: Negative.    Neurological: Positive for headache.   Hematological: Negative.    Psychiatric/Behavioral: Negative.        Objective   Physical Exam    Constitutional: She is oriented to person, place, and time. She appears well-developed and well-nourished. No distress.   HENT:   Head: Normocephalic and atraumatic.   Eyes: Conjunctivae are normal.   Neck: Neck supple. Muscular tenderness present. No spinous process tenderness present. No neck rigidity. Decreased range of motion present. No edema and no erythema present. No thyromegaly present.       Cardiovascular: Normal rate, regular rhythm and normal heart sounds.   Pulmonary/Chest: Effort normal and breath sounds normal. No respiratory distress. She has no wheezes. She has no rales.   Musculoskeletal: She exhibits no edema or tenderness.   Lymphadenopathy:     She has no cervical adenopathy.   Neurological: She is alert and oriented to person, place, and time. She is not disoriented. No cranial nerve deficit or sensory deficit. Coordination and gait normal.   Skin: Skin is warm and dry. No rash noted. She is not diaphoretic. No erythema. No pallor.   Psychiatric: She has a normal mood and affect. Her behavior is normal. Judgment and thought content normal.   Nursing note and vitals reviewed.        Assessment/Plan   Ga was seen today for follow-up and headache.    Diagnoses and all orders for this visit:    Essential hypertension    Migraine without status migrainosus, not intractable, unspecified migraine type    Iron deficiency  -     Iron Profile; Future  -     Vitamin B12; Future  -     CBC & Differential; Future  -     Ferritin; Future    Neck pain, musculoskeletal    Other orders  -     cyclobenzaprine (FLEXERIL) 10 MG tablet; Take 1 tablet by mouth 3 (Three) Times a Day As Needed for Muscle Spasms for up to 90 days.    1.  Essential hypertension-controlled.  No change in therapy at this time.    2.  Migraine without status migrainosus- controlled.  No change in therapy at this time.    3.  Iron deficiency- iron profile, vitamin B12, CBC, ferritin.  Will call with results.    4.  Musculoskeletal  neck pain- increase Flexeril to 10 mg 1 tablet 3 times daily as needed.  We will continue to monitor.    5.  Follow-up in 6 months or sooner if needed.            This document has been electronically signed by ROSEMARY Mccabe on October 15, 2019 5:43 PM

## 2019-10-15 LAB
BASOPHILS # BLD AUTO: 0.12 10*3/MM3 (ref 0–0.2)
BASOPHILS NFR BLD AUTO: 1.1 % (ref 0–1.5)
DEPRECATED RDW RBC AUTO: 43.7 FL (ref 37–54)
EOSINOPHIL # BLD AUTO: 0.21 10*3/MM3 (ref 0–0.4)
EOSINOPHIL NFR BLD AUTO: 1.9 % (ref 0.3–6.2)
ERYTHROCYTE [DISTWIDTH] IN BLOOD BY AUTOMATED COUNT: 13.2 % (ref 12.3–15.4)
FERRITIN SERPL-MCNC: 22.3 NG/ML (ref 13–150)
HCT VFR BLD AUTO: 41.5 % (ref 34–46.6)
HGB BLD-MCNC: 13.9 G/DL (ref 12–15.9)
IMM GRANULOCYTES # BLD AUTO: 0.07 10*3/MM3 (ref 0–0.05)
IMM GRANULOCYTES NFR BLD AUTO: 0.6 % (ref 0–0.5)
IRON 24H UR-MRATE: 113 MCG/DL (ref 37–145)
IRON SATN MFR SERPL: 24 % (ref 20–50)
LYMPHOCYTES # BLD AUTO: 3.91 10*3/MM3 (ref 0.7–3.1)
LYMPHOCYTES NFR BLD AUTO: 35.6 % (ref 19.6–45.3)
MCH RBC QN AUTO: 30.3 PG (ref 26.6–33)
MCHC RBC AUTO-ENTMCNC: 33.5 G/DL (ref 31.5–35.7)
MCV RBC AUTO: 90.6 FL (ref 79–97)
MONOCYTES # BLD AUTO: 0.81 10*3/MM3 (ref 0.1–0.9)
MONOCYTES NFR BLD AUTO: 7.4 % (ref 5–12)
NEUTROPHILS # BLD AUTO: 5.85 10*3/MM3 (ref 1.7–7)
NEUTROPHILS NFR BLD AUTO: 53.4 % (ref 42.7–76)
NRBC BLD AUTO-RTO: 0 /100 WBC (ref 0–0.2)
PLATELET # BLD AUTO: 377 10*3/MM3 (ref 140–450)
PMV BLD AUTO: 10.1 FL (ref 6–12)
RBC # BLD AUTO: 4.58 10*6/MM3 (ref 3.77–5.28)
TIBC SERPL-MCNC: 465 MCG/DL (ref 298–536)
TRANSFERRIN SERPL-MCNC: 312 MG/DL (ref 200–360)
VIT B12 BLD-MCNC: 303 PG/ML (ref 211–946)
WBC NRBC COR # BLD: 10.97 10*3/MM3 (ref 3.4–10.8)

## 2019-10-22 RX ORDER — DOXYCYCLINE HYCLATE 50 MG/1
CAPSULE, GELATIN COATED ORAL
Qty: 30 TABLET | Refills: 0 | Status: SHIPPED | OUTPATIENT
Start: 2019-10-22 | End: 2020-04-06 | Stop reason: SDUPTHER

## 2019-11-25 ENCOUNTER — HOSPITAL ENCOUNTER (EMERGENCY)
Facility: HOSPITAL | Age: 59
Discharge: HOME | End: 2019-11-25
Attending: FAMILY MEDICINE | Admitting: FAMILY MEDICINE

## 2019-11-25 ENCOUNTER — APPOINTMENT (OUTPATIENT)
Dept: CT IMAGING | Facility: HOSPITAL | Age: 59
End: 2019-11-25

## 2019-11-25 ENCOUNTER — APPOINTMENT (OUTPATIENT)
Dept: GENERAL RADIOLOGY | Facility: HOSPITAL | Age: 59
End: 2019-11-25

## 2019-11-25 VITALS
TEMPERATURE: 98.2 F | DIASTOLIC BLOOD PRESSURE: 71 MMHG | SYSTOLIC BLOOD PRESSURE: 153 MMHG | HEIGHT: 63 IN | RESPIRATION RATE: 18 BRPM | BODY MASS INDEX: 25.16 KG/M2 | WEIGHT: 142 LBS | HEART RATE: 66 BPM | OXYGEN SATURATION: 96 %

## 2019-11-25 DIAGNOSIS — G51.0 BELL'S PALSY: Primary | ICD-10-CM

## 2019-11-25 LAB
ABO GROUP BLD: NORMAL
ALBUMIN SERPL-MCNC: 4.6 G/DL (ref 3.5–5.2)
ALBUMIN/GLOB SERPL: 1.6 G/DL
ALP SERPL-CCNC: 110 U/L (ref 39–117)
ALT SERPL W P-5'-P-CCNC: 12 U/L (ref 1–33)
ANION GAP SERPL CALCULATED.3IONS-SCNC: 10 MMOL/L (ref 5–15)
AST SERPL-CCNC: 18 U/L (ref 1–32)
BASOPHILS # BLD AUTO: 0.08 10*3/MM3 (ref 0–0.2)
BASOPHILS NFR BLD AUTO: 0.8 % (ref 0–1.5)
BILIRUB SERPL-MCNC: 0.3 MG/DL (ref 0.2–1.2)
BLD GP AB SCN SERPL QL: NEGATIVE
BUN BLD-MCNC: 19 MG/DL (ref 6–20)
BUN/CREAT SERPL: 24.1 (ref 7–25)
CALCIUM SPEC-SCNC: 9.8 MG/DL (ref 8.6–10.5)
CHLORIDE SERPL-SCNC: 103 MMOL/L (ref 98–107)
CO2 SERPL-SCNC: 27 MMOL/L (ref 22–29)
CREAT BLD-MCNC: 0.79 MG/DL (ref 0.57–1)
DEPRECATED RDW RBC AUTO: 44.5 FL (ref 37–54)
EOSINOPHIL # BLD AUTO: 0.14 10*3/MM3 (ref 0–0.4)
EOSINOPHIL NFR BLD AUTO: 1.4 % (ref 0.3–6.2)
ERYTHROCYTE [DISTWIDTH] IN BLOOD BY AUTOMATED COUNT: 13.6 % (ref 12.3–15.4)
GFR SERPL CREATININE-BSD FRML MDRD: 75 ML/MIN/1.73
GLOBULIN UR ELPH-MCNC: 2.9 GM/DL
GLUCOSE BLD-MCNC: 97 MG/DL (ref 65–99)
HCT VFR BLD AUTO: 44.3 % (ref 34–46.6)
HGB BLD-MCNC: 14.5 G/DL (ref 12–15.9)
HOLD SPECIMEN: NORMAL
HOLD SPECIMEN: NORMAL
IMM GRANULOCYTES # BLD AUTO: 0.05 10*3/MM3 (ref 0–0.05)
IMM GRANULOCYTES NFR BLD AUTO: 0.5 % (ref 0–0.5)
INR PPP: 1.02 (ref 0.8–1.2)
LYMPHOCYTES # BLD AUTO: 3.57 10*3/MM3 (ref 0.7–3.1)
LYMPHOCYTES NFR BLD AUTO: 35.6 % (ref 19.6–45.3)
Lab: NORMAL
MCH RBC QN AUTO: 29.4 PG (ref 26.6–33)
MCHC RBC AUTO-ENTMCNC: 32.7 G/DL (ref 31.5–35.7)
MCV RBC AUTO: 89.9 FL (ref 79–97)
MONOCYTES # BLD AUTO: 0.64 10*3/MM3 (ref 0.1–0.9)
MONOCYTES NFR BLD AUTO: 6.4 % (ref 5–12)
NEUTROPHILS # BLD AUTO: 5.54 10*3/MM3 (ref 1.7–7)
NEUTROPHILS NFR BLD AUTO: 55.3 % (ref 42.7–76)
NRBC BLD AUTO-RTO: 0 /100 WBC (ref 0–0.2)
PLATELET # BLD AUTO: 327 10*3/MM3 (ref 140–450)
PMV BLD AUTO: 9.5 FL (ref 6–12)
POTASSIUM BLD-SCNC: 4.6 MMOL/L (ref 3.5–5.2)
PROT SERPL-MCNC: 7.5 G/DL (ref 6–8.5)
PROTHROMBIN TIME: 13.2 SECONDS (ref 11.1–15.3)
RBC # BLD AUTO: 4.93 10*6/MM3 (ref 3.77–5.28)
RH BLD: NEGATIVE
SODIUM BLD-SCNC: 140 MMOL/L (ref 136–145)
T&S EXPIRATION DATE: NORMAL
WBC NRBC COR # BLD: 10.02 10*3/MM3 (ref 3.4–10.8)
WHOLE BLOOD HOLD SPECIMEN: NORMAL
WHOLE BLOOD HOLD SPECIMEN: NORMAL

## 2019-11-25 PROCEDURE — 86900 BLOOD TYPING SEROLOGIC ABO: CPT | Performed by: FAMILY MEDICINE

## 2019-11-25 PROCEDURE — 93010 ELECTROCARDIOGRAM REPORT: CPT | Performed by: INTERNAL MEDICINE

## 2019-11-25 PROCEDURE — 85610 PROTHROMBIN TIME: CPT | Performed by: FAMILY MEDICINE

## 2019-11-25 PROCEDURE — 93005 ELECTROCARDIOGRAM TRACING: CPT | Performed by: FAMILY MEDICINE

## 2019-11-25 PROCEDURE — 71045 X-RAY EXAM CHEST 1 VIEW: CPT

## 2019-11-25 PROCEDURE — 85025 COMPLETE CBC W/AUTO DIFF WBC: CPT | Performed by: FAMILY MEDICINE

## 2019-11-25 PROCEDURE — 86901 BLOOD TYPING SEROLOGIC RH(D): CPT | Performed by: FAMILY MEDICINE

## 2019-11-25 PROCEDURE — 86850 RBC ANTIBODY SCREEN: CPT | Performed by: FAMILY MEDICINE

## 2019-11-25 PROCEDURE — 99283 EMERGENCY DEPT VISIT LOW MDM: CPT

## 2019-11-25 PROCEDURE — 80053 COMPREHEN METABOLIC PANEL: CPT | Performed by: FAMILY MEDICINE

## 2019-11-25 PROCEDURE — 70450 CT HEAD/BRAIN W/O DYE: CPT

## 2019-11-25 RX ORDER — ACYCLOVIR 400 MG/1
400 TABLET ORAL
Qty: 35 TABLET | Refills: 0 | Status: SHIPPED | OUTPATIENT
Start: 2019-11-25 | End: 2019-12-02

## 2019-11-25 RX ORDER — SODIUM CHLORIDE 0.9 % (FLUSH) 0.9 %
10 SYRINGE (ML) INJECTION AS NEEDED
Status: DISCONTINUED | OUTPATIENT
Start: 2019-11-25 | End: 2019-11-25 | Stop reason: HOSPADM

## 2019-11-25 RX ORDER — METHYLPREDNISOLONE 4 MG/1
TABLET ORAL
Qty: 21 TABLET | Refills: 0 | Status: SHIPPED | OUTPATIENT
Start: 2019-11-25 | End: 2020-04-24

## 2019-11-25 NOTE — ED PROVIDER NOTES
Subjective   Left facial paralysis since Friday (4 days).  Patient states her father had a history of Bell's palsy.  Patient is a smoker, who denies any past medical history of stroke.        Facial Droop   Location:  Left  Severity:  Moderate  Onset quality:  Sudden  Duration:  4 days  Timing:  Constant  Progression:  Unchanged  Chronicity:  New  Relieved by:  Noting  Worsened by:  Nothing  Ineffective treatments:  None tried  Associated symptoms: ear pain (left)    Associated symptoms: no abdominal pain, no chest pain, no congestion, no cough, no diarrhea, no fatigue, no fever, no headaches, no myalgias, no nausea, no rash, no rhinorrhea, no shortness of breath, no sore throat, no vomiting and no wheezing        Review of Systems   Constitutional: Negative for appetite change, chills, diaphoresis, fatigue and fever.   HENT: Positive for ear pain (left). Negative for congestion, ear discharge, nosebleeds, rhinorrhea, sinus pressure, sore throat and trouble swallowing.    Eyes: Negative for discharge and redness.   Respiratory: Negative for apnea, cough, chest tightness, shortness of breath and wheezing.    Cardiovascular: Negative for chest pain.   Gastrointestinal: Negative for abdominal pain, diarrhea, nausea and vomiting.   Endocrine: Negative for polyuria.   Genitourinary: Negative for dysuria, frequency and urgency.   Musculoskeletal: Negative for myalgias and neck pain.   Skin: Negative for color change and rash.   Allergic/Immunologic: Negative for immunocompromised state.   Neurological: Negative for dizziness, seizures, syncope, weakness, light-headedness and headaches.   Hematological: Negative for adenopathy. Does not bruise/bleed easily.   Psychiatric/Behavioral: Negative for behavioral problems and confusion.   All other systems reviewed and are negative.      Past Medical History:   Diagnosis Date   • Anxiety     Anxiety state      • Bipolar affective disorder, current episode depressed (CMS/[facility])    •  Bipolar disorder (CMS/HCC)    • Breast cyst    • Drug therapy     Long-term drug therapy      • Essential hypertension    • Headache    • Hyperlipidemia    • Influenza    • Liver function test abnormality     Abnormal liver test   • Neuropathic pain    • Obesity    • Pain of breast     No evidence of cancer      • Urinary tract infectious disease        No Known Allergies    Past Surgical History:   Procedure Laterality Date   • APPENDECTOMY      Appendectomy (1)      • BREAST BIOPSY     • COLONOSCOPY N/A 2/14/2019    Procedure: COLONOSCOPY;  Surgeon: Jaiyeola, Aderemi, MD;  Location: [facility] ENDOSCOPY;  Service: General   • DIAGNOSTIC LAPAROSCOPY  03/04/2003    Laparosc diagnostic (1)      • ENDOSCOPY W/ PEG TUBE PLACEMENT  03/21/2003    EGD w/ tube [id number] (1)      • LAPAROSCOPY ESOPHAGOGASTRIC FUNDOPLASTY HYBRID  02/10/2005    Fundoplasty, laparoscopic (1)      • PAP SMEAR  08/18/2009    PAP SMEAR (1)      • TOTAL ABDOMINAL HYSTERECTOMY WITH SALPINGO OOPHORECTOMY  03/25/2003    JOSE/BSO (1)          Family History   Problem Relation Age of Onset   • Depression Other    • Diabetes Other    • Heart disease Other    • Hypertension Other    • Stroke Other    • Lung disease Other    • Other Other         Colon problems   • COPD Mother    • Heart disease Father    • Lung disease Father    • Hypertension Sister    • Hypertension Brother    • Fibromyalgia Daughter    • Anxiety disorder Son    • Diabetes Brother    • No Known Problems Daughter        Social History     Socioeconomic History   • Marital status:      Spouse name: Not on file   • Number of children: Not on file   • Years of education: Not on file   • Highest education level: Not on file   Tobacco Use   • Smoking status: Current Every Day Smoker     Packs/day: 1.00     Years: 25.00     Pack years: 25.00     Types: Cigarettes   • Smokeless tobacco: Never Used   Substance and Sexual Activity   • Alcohol use: No     Frequency: Never   • Drug use: No   •  Sexual activity: Defer           Objective   Physical Exam   Constitutional: She is oriented to person, place, and time. She appears well-developed and well-nourished.   HENT:   Head: Normocephalic and atraumatic.   Nose: Nose normal.   Mouth/Throat: Oropharynx is clear and moist.   Eyes: Conjunctivae and EOM are normal. Pupils are equal, round, and reactive to light. Right eye exhibits no discharge. Left eye exhibits no discharge. No scleral icterus.   Neck: Normal range of motion. Neck supple. No tracheal deviation present.   Cardiovascular: Normal rate, regular rhythm and normal heart sounds.   No murmur heard.  Pulmonary/Chest: Effort normal and breath sounds normal. No stridor. No respiratory distress. She has no wheezes. She has no rales.   Abdominal: Soft. Bowel sounds are normal. She exhibits no distension and no mass. There is no tenderness. There is no rebound and no guarding.   Musculoskeletal: She exhibits no edema.   Neurological: She is alert and oriented to person, place, and time. Coordination normal. GCS eye subscore is 4. GCS verbal subscore is 5. GCS motor subscore is 6.   Paralysis of the patient's left base is present which includes the patient's forehead.   Skin: Skin is warm and dry. No rash noted. No erythema.   Psychiatric: She has a normal mood and affect. Her behavior is normal. Thought content normal.   Nursing note and vitals reviewed.      ECG 12 Lead    Date/Time: 11/25/2019 5:16 PM  Performed by: Javier Mcgregor MD  Authorized by: Javier Mcgregor MD   Interpreted by physician  Rhythm: sinus rhythm  Rate: normal  BPM: 63  Conduction: complete RBBB  ST Segments: ST segments normal                   ED Course          Labs Reviewed   CBC WITH AUTO DIFFERENTIAL - Abnormal; Notable for the following components:       Result Value    Lymphocytes, Absolute 3.57 (*)     All other components within normal limits   PROTIME-INR - Normal    Narrative:     Therapeutic range for most  indications is 2.0-3.0 INR,  or 2.5-3.5 for mechanical heart valves.   COMPREHENSIVE METABOLIC PANEL    Narrative:     GFR Normal >60  Chronic Kidney Disease <60  Kidney Failure <15   RAINBOW DRAW    Narrative:     The following orders were created for panel order [place] Draw.  Procedure                               Abnormality         Status                     ---------                               -----------         ------                     Light Blue Top[[id number]]                                   Final result               Green Top (Gel)[[id number]]                                  Final result               Lavender Top[[id number]]                                     Final result               Gold Top - SST[[id number]]                                   Final result                 Please view results for these tests on the individual orders.   POCT GLUCOSE FINGERSTICK   TYPE AND SCREEN   PREVIOUS HISTORY   CBC AND DIFFERENTIAL    Narrative:     The following orders were created for panel order CBC & Differential.  Procedure                               Abnormality         Status                     ---------                               -----------         ------                     CBC Auto Differential[[id number]]        Abnormal            Final result                 Please view results for these tests on the individual orders.   LIGHT BLUE TOP   GREEN TOP   LAVENDER TOP   GOLD TOP - SST       CT Head Without Contrast Stroke Protocol   Final Result   CONCLUSION:   1.  Negative examination for acute intracranial pathology.      If signs or symptoms persist beyond reasonable expectations, a   MRI examination is suggested as is deemed clinically appropriate.      Electronically signed by:  JONO Gustafson MD  11/25/2019 3:55   PM [facility] [[email or web address]] Workstation: [id number]      XR Chest 1 View   Final Result   CONCLUSION:      1. No evidence of an active cardiopulmonary process.      Electronically signed by:  JONO  Geo Gustafson MD  11/25/2019 3:16   PM CST Workstation: [id number]                    [facility]    Final diagnoses:   Bell's palsy              Javier Mcgregor MD  11/25/19 [id number]

## 2019-12-16 RX ORDER — FAMOTIDINE 40 MG/1
TABLET, FILM COATED ORAL
Qty: 90 TABLET | Refills: 3 | Status: SHIPPED | OUTPATIENT
Start: 2019-12-16 | End: 2020-04-06 | Stop reason: SDUPTHER

## 2019-12-26 RX ORDER — TOPIRAMATE 50 MG/1
TABLET, FILM COATED ORAL
Qty: 90 TABLET | Refills: 2 | Status: SHIPPED | OUTPATIENT
Start: 2019-12-26 | End: 2020-04-06 | Stop reason: SDUPTHER

## 2020-01-30 RX ORDER — CYCLOBENZAPRINE HCL 10 MG
TABLET ORAL
Qty: 90 TABLET | Refills: 2 | Status: SHIPPED | OUTPATIENT
Start: 2020-01-30 | End: 2020-04-06 | Stop reason: SDUPTHER

## 2020-04-06 ENCOUNTER — OFFICE VISIT (OUTPATIENT)
Dept: FAMILY MEDICINE CLINIC | Facility: CLINIC | Age: 60
End: 2020-04-06

## 2020-04-06 VITALS — WEIGHT: 142 LBS | BODY MASS INDEX: 25.16 KG/M2 | HEIGHT: 63 IN

## 2020-04-06 DIAGNOSIS — I10 ESSENTIAL HYPERTENSION: Primary | ICD-10-CM

## 2020-04-06 DIAGNOSIS — G43.009 MIGRAINE WITHOUT AURA AND WITHOUT STATUS MIGRAINOSUS, NOT INTRACTABLE: ICD-10-CM

## 2020-04-06 DIAGNOSIS — E61.1 IRON DEFICIENCY: ICD-10-CM

## 2020-04-06 PROCEDURE — 99213 OFFICE O/P EST LOW 20 MIN: CPT | Performed by: NURSE PRACTITIONER

## 2020-04-06 RX ORDER — ALBUTEROL SULFATE 1.25 MG/3ML
1 SOLUTION RESPIRATORY (INHALATION) EVERY 6 HOURS PRN
Qty: 30 VIAL | Refills: 2 | Status: SHIPPED | OUTPATIENT
Start: 2020-04-06

## 2020-04-06 RX ORDER — CYCLOBENZAPRINE HCL 10 MG
10 TABLET ORAL 3 TIMES DAILY PRN
Qty: 90 TABLET | Refills: 2 | Status: SHIPPED | OUTPATIENT
Start: 2020-04-06 | End: 2020-07-06

## 2020-04-06 RX ORDER — FLUTICASONE PROPIONATE 50 MCG
2 SPRAY, SUSPENSION (ML) NASAL DAILY
Qty: 18.2 ML | Refills: 0 | Status: SHIPPED | OUTPATIENT
Start: 2020-04-06 | End: 2021-06-11

## 2020-04-06 RX ORDER — ALBUTEROL SULFATE 90 UG/1
2 AEROSOL, METERED RESPIRATORY (INHALATION) EVERY 4 HOURS PRN
Qty: 1 INHALER | Refills: 3 | Status: SHIPPED | OUTPATIENT
Start: 2020-04-06 | End: 2021-06-11

## 2020-04-06 RX ORDER — DOXYCYCLINE HYCLATE 50 MG/1
1 CAPSULE, GELATIN COATED ORAL 2 TIMES DAILY WITH MEALS
Qty: 60 TABLET | Refills: 3 | Status: SHIPPED | OUTPATIENT
Start: 2020-04-06 | End: 2020-12-10 | Stop reason: SDUPTHER

## 2020-04-06 RX ORDER — BUSPIRONE HYDROCHLORIDE 7.5 MG/1
TABLET ORAL
COMMUNITY
Start: 2020-03-03 | End: 2020-05-21

## 2020-04-06 RX ORDER — LISINOPRIL 40 MG/1
40 TABLET ORAL DAILY
Qty: 90 TABLET | Refills: 3 | Status: SHIPPED | OUTPATIENT
Start: 2020-04-06 | End: 2021-05-03

## 2020-04-06 RX ORDER — FAMOTIDINE 40 MG/1
40 TABLET, FILM COATED ORAL DAILY
Qty: 90 TABLET | Refills: 3 | Status: SHIPPED | OUTPATIENT
Start: 2020-04-06 | End: 2021-04-16

## 2020-04-06 RX ORDER — HYDROXYZINE PAMOATE 25 MG/1
25 CAPSULE ORAL
Qty: 30 CAPSULE | Refills: 1 | Status: SHIPPED | OUTPATIENT
Start: 2020-04-06 | End: 2021-01-04

## 2020-04-06 RX ORDER — TOPIRAMATE 50 MG/1
50 TABLET, FILM COATED ORAL DAILY
Qty: 90 TABLET | Refills: 2 | Status: SHIPPED | OUTPATIENT
Start: 2020-04-06 | End: 2021-06-01

## 2020-04-06 RX ORDER — LAMOTRIGINE 200 MG/1
200 TABLET ORAL DAILY
Qty: 90 TABLET | Refills: 1 | Status: SHIPPED | OUTPATIENT
Start: 2020-04-06 | End: 2020-09-02

## 2020-04-06 RX ORDER — ROSUVASTATIN CALCIUM 20 MG/1
20 TABLET, COATED ORAL DAILY
Qty: 90 TABLET | Refills: 3 | Status: SHIPPED | OUTPATIENT
Start: 2020-04-06 | End: 2021-05-03

## 2020-04-06 RX ORDER — AMITRIPTYLINE HYDROCHLORIDE 75 MG/1
75 TABLET, FILM COATED ORAL NIGHTLY
Qty: 90 TABLET | Refills: 2 | Status: SHIPPED | OUTPATIENT
Start: 2020-04-06 | End: 2021-05-17

## 2020-04-06 NOTE — PROGRESS NOTES
You have chosen to receive care through a telephone visit today. Do you consent to use a telephone visit for your medical care today? YesSubjective   Ga Mendoza is a 59 y.o. female.     Ms. Mendoza 59-year-old female who presents today via telephone visit for follow-up related to hypertension, chronic migraines and iron deficiency anemia.  She states her last checked blood pressure was 130/90.  She denies chest pain, shortness of breath, palpitations, edema.  She is taking her medication as prescribed.  She reports her chronic migraines are well controlled with very infrequent headache episodes.  She is taking her iron as prescribed and denies any increased fatigue.       The following portions of the patient's history were reviewed and updated as appropriate: allergies, current medications, past family history, past medical history, past social history, past surgical history and problem list.    Review of Systems   Constitutional: Negative for activity change, appetite change, fatigue, unexpected weight gain and unexpected weight loss.   HENT: Negative for congestion, sore throat, trouble swallowing and voice change.    Eyes: Negative.    Respiratory: Negative for cough, chest tightness, shortness of breath and wheezing.    Cardiovascular: Negative for chest pain, palpitations and leg swelling.   Gastrointestinal: Negative for abdominal pain, constipation, diarrhea, nausea and vomiting.   Endocrine: Negative.  Negative for cold intolerance, heat intolerance, polydipsia, polyphagia and polyuria.   Genitourinary: Negative for dysuria.   Musculoskeletal: Negative for arthralgias and myalgias.   Skin: Negative for rash.   Allergic/Immunologic: Negative.    Neurological: Negative.  Negative for headache.   Hematological: Negative.    Psychiatric/Behavioral: Negative.        Objective   Physical Exam   Constitutional:   Physical assessment deferred due to telephone visit.         Assessment/Plan   Ga was  seen today for follow-up.    Diagnoses and all orders for this visit:    Essential hypertension  -     Lipid Panel; Future  -     Comprehensive Metabolic Panel; Future    Iron deficiency  -     Iron Profile; Future  -     Vitamin B12; Future  -     Ferritin; Future  -     CBC & Differential; Future    Migraine without aura and without status migrainosus, not intractable    Other orders  -     lamoTRIgine (LaMICtal) 200 MG tablet; Take 1 tablet by mouth Daily.  -     topiramate (TOPAMAX) 50 MG tablet; Take 1 tablet by mouth Daily.  -     albuterol (ACCUNEB) 1.25 MG/3ML nebulizer solution; Take 3 mL by nebulization Every 6 (Six) Hours As Needed for Wheezing or Shortness of Air.  -     albuterol sulfate HFA (ProAir HFA) 108 (90 Base) MCG/ACT inhaler; Inhale 2 puffs Every 4 (Four) Hours As Needed for Wheezing or Shortness of Air.  -     amitriptyline (ELAVIL) 75 MG tablet; Take 1 tablet by mouth Every Night.  -     cyclobenzaprine (FLEXERIL) 10 MG tablet; Take 1 tablet by mouth 3 (Three) Times a Day As Needed for Muscle Spasms.  -     famotidine (PEPCID) 40 MG tablet; Take 1 tablet by mouth Daily.  -     ferrous gluconate (FERGON) 324 MG tablet; Take 1 tablet by mouth 2 (Two) Times a Day With Meals.  -     fluticasone (FLONASE) 50 MCG/ACT nasal spray; 2 sprays into the nostril(s) as directed by provider Daily. Administer 2 sprays in each nostril for each dose.  -     hydrOXYzine pamoate (VISTARIL) 25 MG capsule; Take 1 capsule by mouth every night at bedtime.  -     lisinopril (PRINIVIL,ZESTRIL) 40 MG tablet; Take 1 tablet by mouth Daily.  -     metoprolol tartrate (LOPRESSOR) 25 MG tablet; Take 1 tablet by mouth 2 (Two) Times a Day.  -     rosuvastatin (CRESTOR) 20 MG tablet; Take 1 tablet by mouth Daily.  -     Vortioxetine HBr (Trintellix) 20 MG tablet; Take 20 mg by mouth Daily.    1.  Essential hypertension- controlled.  No change in therapy at this time.  Lipid panel and CMP when traveling restrictions lifted.   Will call with results.    2.  Iron deficiency-iron profile, vitamin B12, ferritin, CBC.  Will call with results.    3.  Migraine without aura without status migrainosus, not intractable- controlled.  No change in therapy at this time.    4.  Follow-up in 6 months or sooner if needed.            This document has been electronically signed by ROSEMARY Mccabe on April 6, 2020 14:39    This visit has been rescheduled as a phone visit to comply with patient safety concerns in accordance with CDC recommendations. The time that was spent in reviewing the patient's chart and addressing the patient's symptoms, diagnosis and treatment was 21  mins.

## 2020-04-24 ENCOUNTER — OFFICE VISIT (OUTPATIENT)
Dept: FAMILY MEDICINE CLINIC | Facility: CLINIC | Age: 60
End: 2020-04-24

## 2020-04-24 VITALS — HEIGHT: 63 IN | WEIGHT: 142 LBS | BODY MASS INDEX: 25.16 KG/M2

## 2020-04-24 DIAGNOSIS — G25.81 RLS (RESTLESS LEGS SYNDROME): Primary | ICD-10-CM

## 2020-04-24 PROCEDURE — 99442 PR PHYS/QHP TELEPHONE EVALUATION 11-20 MIN: CPT | Performed by: NURSE PRACTITIONER

## 2020-04-24 RX ORDER — ROPINIROLE 0.25 MG/1
0.25 TABLET, FILM COATED ORAL NIGHTLY PRN
Qty: 30 TABLET | Refills: 1 | Status: SHIPPED | OUTPATIENT
Start: 2020-04-24 | End: 2020-10-12 | Stop reason: SDUPTHER

## 2020-04-24 NOTE — PROGRESS NOTES
You have chosen to receive care through a telephone visit. Do you consent to use a telephone visit for your medical care today? YesSubjective   Ga Mendoza is a 59 y.o. female.     Ms. Mendoza is a 59-year-old female who presents today via telephone visit for evaluation of restless leg symptoms.  This is a chronic problem that is intermittent in nature.  She states it is flared up again over the last 4 to 5 days.  Her current pain medication and muscle relaxers do not alleviate symptoms.  She is unable to get up and walk to relieve the pain because it causes her neuropathy symptoms to worse.  She has no calf pain, swelling in her legs, color changes to the skin of her legs.       The following portions of the patient's history were reviewed and updated as appropriate: allergies, current medications, past family history, past medical history, past social history, past surgical history and problem list.    Review of Systems   Constitutional: Negative for activity change, appetite change, fatigue, unexpected weight gain and unexpected weight loss.   HENT: Negative for congestion, sore throat, trouble swallowing and voice change.    Eyes: Negative.    Respiratory: Negative for cough, chest tightness, shortness of breath and wheezing.    Cardiovascular: Negative for chest pain, palpitations and leg swelling.   Gastrointestinal: Negative for abdominal pain, constipation, diarrhea, nausea and vomiting.   Endocrine: Negative.    Genitourinary: Negative for dysuria.   Musculoskeletal: Positive for myalgias (Restless leg symptoms primarily at night.  Recent exacerbation started 4 to 5 days ago.). Negative for arthralgias.   Skin: Negative for rash.   Allergic/Immunologic: Negative.    Neurological: Negative.    Hematological: Negative.    Psychiatric/Behavioral: Negative.        Objective   Physical Exam   Constitutional: No distress.   Physical assessment deferred due to limitations of telephone visit.    Psychiatric: She has a normal mood and affect. Her speech is normal and behavior is normal. Judgment and thought content normal. Cognition and memory are normal. She is attentive.         Assessment/Plan   Ga was seen today for leg pain.    Diagnoses and all orders for this visit:    RLS (restless legs syndrome)    Other orders  -     rOPINIRole (Requip) 0.25 MG tablet; Take 1 tablet by mouth At Night As Needed (Restless leg symptoms). Take 1 hour before bedtime.      1.  Restless leg syndrome-Requip 0.25 mg 1 tablet p.o. nightly as needed for restless leg symptoms.  Patient instructed to take with caution as it may cause drowsiness.  Patient instructed not to work, drive or operate machinery while taking this medication.  Instructed not to mix with alcohol or other medications.  Patient verbalized understanding of instruction and agrees with plan of care.    2.  Follow-up as scheduled or sooner if symptoms persist or for any other acute needs.            This document has been electronically signed by ROSEMARY Mccabe on April 24, 2020 10:50    This visit has been rescheduled as a phone visit to comply with patient safety concerns in accordance with CDC recommendations. The time that was spent in reviewing the patient's chart and addressing the patient's symptoms, diagnosis and treatment was  14 mins.

## 2020-05-21 RX ORDER — BUSPIRONE HYDROCHLORIDE 7.5 MG/1
TABLET ORAL
Qty: 270 TABLET | Refills: 2 | Status: SHIPPED | OUTPATIENT
Start: 2020-05-21 | End: 2021-02-17

## 2020-05-21 RX ORDER — TOPIRAMATE 100 MG/1
TABLET, FILM COATED ORAL
Qty: 90 TABLET | Refills: 2 | Status: SHIPPED | OUTPATIENT
Start: 2020-05-21 | End: 2021-02-17

## 2020-07-06 RX ORDER — CYCLOBENZAPRINE HCL 10 MG
TABLET ORAL
Qty: 90 TABLET | Refills: 2 | Status: SHIPPED | OUTPATIENT
Start: 2020-07-06 | End: 2020-10-02

## 2020-09-02 RX ORDER — LAMOTRIGINE 200 MG/1
TABLET ORAL
Qty: 90 TABLET | Refills: 1 | Status: SHIPPED | OUTPATIENT
Start: 2020-09-02 | End: 2021-03-15

## 2020-10-02 RX ORDER — CYCLOBENZAPRINE HCL 10 MG
TABLET ORAL
Qty: 90 TABLET | Refills: 2 | Status: SHIPPED | OUTPATIENT
Start: 2020-10-02 | End: 2021-01-22

## 2020-10-12 ENCOUNTER — TELEMEDICINE (OUTPATIENT)
Dept: FAMILY MEDICINE CLINIC | Facility: CLINIC | Age: 60
End: 2020-10-12

## 2020-10-12 DIAGNOSIS — G43.009 MIGRAINE WITHOUT AURA AND WITHOUT STATUS MIGRAINOSUS, NOT INTRACTABLE: ICD-10-CM

## 2020-10-12 DIAGNOSIS — I10 ESSENTIAL HYPERTENSION: Primary | ICD-10-CM

## 2020-10-12 DIAGNOSIS — G25.81 RLS (RESTLESS LEGS SYNDROME): ICD-10-CM

## 2020-10-12 DIAGNOSIS — J44.9 CHRONIC OBSTRUCTIVE PULMONARY DISEASE, UNSPECIFIED COPD TYPE (HCC): ICD-10-CM

## 2020-10-12 DIAGNOSIS — E61.1 IRON DEFICIENCY: ICD-10-CM

## 2020-10-12 PROCEDURE — 99214 OFFICE O/P EST MOD 30 MIN: CPT | Performed by: NURSE PRACTITIONER

## 2020-10-12 RX ORDER — BUDESONIDE AND FORMOTEROL FUMARATE DIHYDRATE 80; 4.5 UG/1; UG/1
2 AEROSOL RESPIRATORY (INHALATION)
Qty: 10.2 G | Refills: 12 | Status: SHIPPED | OUTPATIENT
Start: 2020-10-12 | End: 2021-04-16

## 2020-10-12 RX ORDER — ROPINIROLE 0.25 MG/1
0.25 TABLET, FILM COATED ORAL NIGHTLY PRN
Qty: 90 TABLET | Refills: 1 | Status: SHIPPED | OUTPATIENT
Start: 2020-10-12 | End: 2021-03-25

## 2020-10-12 NOTE — PROGRESS NOTES
Subjective   Ga Mendoza is a 59 y.o. female.     Hypertension  This is a chronic problem. The current episode started more than 1 year ago. The problem is controlled. Associated symptoms include shortness of breath. Pertinent negatives include no chest pain, palpitations or PND. Risk factors for coronary artery disease include family history, smoking/tobacco exposure, sedentary lifestyle and dyslipidemia. Current antihypertension treatment includes ACE inhibitors and beta blockers. The current treatment provides significant improvement. Compliance problems include exercise.  There is no history of angina or kidney disease.   Migraine   This is a chronic problem. The current episode started more than 1 year ago. The problem occurs intermittently. The problem has been waxing and waning. The pain quality is similar to prior headaches. The quality of the pain is described as aching. The patient is experiencing no pain. Pertinent negatives include no abdominal pain, coughing, dizziness, fever, nausea, numbness, photophobia, rhinorrhea, seizures, sore throat, tingling, vomiting or weight loss. The symptoms are aggravated by bright light. She has tried beta blockers, antidepressants, NSAIDs and darkened room for the symptoms. The treatment provided moderate relief. Her past medical history is significant for hypertension, migraine headaches and migraines in the family.   COPD  She complains of chest tightness and shortness of breath. There is no cough or wheezing. This is a chronic problem. The current episode started more than 1 year ago. The problem occurs intermittently. The problem has been waxing and waning. Pertinent negatives include no appetite change, chest pain, dyspnea on exertion, fever, heartburn, myalgias, PND, postnasal drip, rhinorrhea, sore throat, trouble swallowing or weight loss. Her symptoms are aggravated by change in weather, strenuous activity and exposure to smoke. Her symptoms are  alleviated by steroid inhaler, rest and ipratropium. She reports significant improvement on treatment. Risk factors for lung disease include smoking/tobacco exposure.   Leg Pain   The incident occurred more than 1 week ago (RLS). There was no injury mechanism. The pain is present in the right leg and left leg. The pain has been intermittent (night time, controlled with requip) since onset. Pertinent negatives include no loss of motion, muscle weakness, numbness or tingling. Nothing aggravates the symptoms. She has tried rest (movement and requip) for the symptoms. The treatment provided significant relief.        The following portions of the patient's history were reviewed and updated as appropriate: allergies, current medications, past family history, past medical history, past social history, past surgical history and problem list.    Review of Systems   Constitutional: Negative for activity change, appetite change, chills, fatigue, fever, unexpected weight gain and unexpected weight loss.   HENT: Negative for congestion, postnasal drip, rhinorrhea, sore throat, trouble swallowing and voice change.    Eyes: Negative.  Negative for photophobia.   Respiratory: Positive for chest tightness and shortness of breath. Negative for cough and wheezing.    Cardiovascular: Negative for chest pain, dyspnea on exertion, palpitations, leg swelling and PND.   Gastrointestinal: Negative for abdominal pain, constipation, diarrhea, nausea and vomiting.   Endocrine: Negative.    Genitourinary: Negative for dysuria.   Musculoskeletal: Negative for arthralgias and myalgias.   Skin: Negative for rash.   Allergic/Immunologic: Negative.    Neurological: Negative.  Negative for dizziness, tingling, seizures and numbness.   Hematological: Negative.    Psychiatric/Behavioral: Negative.        Objective   Physical Exam  Constitutional:       General: She is not in acute distress.     Appearance: Normal appearance. She is well-developed. She  is not ill-appearing, toxic-appearing or diaphoretic.      Comments: Physical exam limited due to limitations of telehealth.   HENT:      Head: Normocephalic and atraumatic.   Eyes:      Conjunctiva/sclera: Conjunctivae normal.   Neck:      Musculoskeletal: Normal range of motion.   Pulmonary:      Effort: Pulmonary effort is normal. No respiratory distress.   Musculoskeletal: Normal range of motion.   Skin:     General: Skin is warm and dry.      Coloration: Skin is not pale.      Findings: No erythema or rash.   Neurological:      Mental Status: She is alert and oriented to person, place, and time.   Psychiatric:         Mood and Affect: Mood normal.         Behavior: Behavior normal.         Thought Content: Thought content normal.         Judgment: Judgment normal.           Assessment/Plan   Diagnoses and all orders for this visit:    Essential hypertension    Migraine without aura and without status migrainosus, not intractable    Iron deficiency    Chronic obstructive pulmonary disease, unspecified COPD type (CMS/HCC)    RLS (restless legs syndrome)    Other orders  -     rOPINIRole (Requip) 0.25 MG tablet; Take 1 tablet by mouth At Night As Needed (Restless leg symptoms). Take 1 hour before bedtime.  -     budesonide-formoterol (Symbicort) 80-4.5 MCG/ACT inhaler; Inhale 2 puffs 2 (Two) Times a Day.    1.  Essential hypertension-blood pressure today 116/76.  No chest pain, shortness breath palpitations or edema.  We will continue to monitor.    2.  Migraine without aura and without status migrainosus, not intractable-controlled.  No change in therapy.  Will continue to monitor.    3.  Iron deficiency-anemia panel previously ordered.  Instructed patient to present to lab.  Will call with results.    4.  COPD-we will add Symbicort 80-4.5 mcg per ACT 2 puffs twice daily.  We will continue to monitor.  Encourage smoking cessation.  Patient not ready to quit at this time.    5.  Restless leg syndrome-controlled.   Refill Requip 0.25 mg 1 tablet nightly.  Instructed not to take with other sedating medications or alcohol.  Patient verbalized understanding of instruction.    6.  Follow-up in 6 months or sooner for any acute needs.            This document has been electronically signed by ROSEMARY Mccabe on October 12, 2020 17:06 CDT    This was an audio and video enabled telemedicine encounter. The time that was spent in reviewing the patient's chart and addressing their symptoms, diagnosis and treatment was 22 mins.

## 2020-12-09 ENCOUNTER — LAB (OUTPATIENT)
Dept: LAB | Facility: HOSPITAL | Age: 60
End: 2020-12-09

## 2020-12-09 DIAGNOSIS — I10 ESSENTIAL HYPERTENSION: ICD-10-CM

## 2020-12-09 DIAGNOSIS — E61.1 IRON DEFICIENCY: ICD-10-CM

## 2020-12-09 LAB
ALBUMIN SERPL-MCNC: 4.3 G/DL (ref 3.5–5.2)
ALBUMIN/GLOB SERPL: 1.5 G/DL
ALP SERPL-CCNC: 132 U/L (ref 39–117)
ALT SERPL W P-5'-P-CCNC: 7 U/L (ref 1–33)
ANION GAP SERPL CALCULATED.3IONS-SCNC: 10 MMOL/L (ref 5–15)
AST SERPL-CCNC: 13 U/L (ref 1–32)
BASOPHILS # BLD AUTO: 0.13 10*3/MM3 (ref 0–0.2)
BASOPHILS NFR BLD AUTO: 1.2 % (ref 0–1.5)
BILIRUB SERPL-MCNC: 0.2 MG/DL (ref 0–1.2)
BUN SERPL-MCNC: 11 MG/DL (ref 6–20)
BUN/CREAT SERPL: 12.2 (ref 7–25)
CALCIUM SPEC-SCNC: 9.2 MG/DL (ref 8.6–10.5)
CHLORIDE SERPL-SCNC: 106 MMOL/L (ref 98–107)
CHOLEST SERPL-MCNC: 141 MG/DL (ref 0–200)
CO2 SERPL-SCNC: 23 MMOL/L (ref 22–29)
CREAT SERPL-MCNC: 0.9 MG/DL (ref 0.57–1)
DEPRECATED RDW RBC AUTO: 44.2 FL (ref 37–54)
EOSINOPHIL # BLD AUTO: 0.14 10*3/MM3 (ref 0–0.4)
EOSINOPHIL NFR BLD AUTO: 1.3 % (ref 0.3–6.2)
ERYTHROCYTE [DISTWIDTH] IN BLOOD BY AUTOMATED COUNT: 13.9 % (ref 12.3–15.4)
FERRITIN SERPL-MCNC: 18.1 NG/ML (ref 13–150)
GFR SERPL CREATININE-BSD FRML MDRD: 64 ML/MIN/1.73
GLOBULIN UR ELPH-MCNC: 2.8 GM/DL
GLUCOSE SERPL-MCNC: 87 MG/DL (ref 65–99)
HCT VFR BLD AUTO: 40.8 % (ref 34–46.6)
HDLC SERPL-MCNC: 43 MG/DL (ref 40–60)
HGB BLD-MCNC: 13.2 G/DL (ref 12–15.9)
IMM GRANULOCYTES # BLD AUTO: 0.07 10*3/MM3 (ref 0–0.05)
IMM GRANULOCYTES NFR BLD AUTO: 0.6 % (ref 0–0.5)
IRON 24H UR-MRATE: 81 MCG/DL (ref 37–145)
IRON SATN MFR SERPL: 16 % (ref 20–50)
LDLC SERPL CALC-MCNC: 73 MG/DL (ref 0–100)
LDLC/HDLC SERPL: 1.6 {RATIO}
LYMPHOCYTES # BLD AUTO: 3.26 10*3/MM3 (ref 0.7–3.1)
LYMPHOCYTES NFR BLD AUTO: 29.1 % (ref 19.6–45.3)
MCH RBC QN AUTO: 28 PG (ref 26.6–33)
MCHC RBC AUTO-ENTMCNC: 32.4 G/DL (ref 31.5–35.7)
MCV RBC AUTO: 86.6 FL (ref 79–97)
MONOCYTES # BLD AUTO: 0.8 10*3/MM3 (ref 0.1–0.9)
MONOCYTES NFR BLD AUTO: 7.1 % (ref 5–12)
NEUTROPHILS NFR BLD AUTO: 6.8 10*3/MM3 (ref 1.7–7)
NEUTROPHILS NFR BLD AUTO: 60.7 % (ref 42.7–76)
NRBC BLD AUTO-RTO: 0 /100 WBC (ref 0–0.2)
PLATELET # BLD AUTO: 386 10*3/MM3 (ref 140–450)
PMV BLD AUTO: 9.7 FL (ref 6–12)
POTASSIUM SERPL-SCNC: 4.7 MMOL/L (ref 3.5–5.2)
PROT SERPL-MCNC: 7.1 G/DL (ref 6–8.5)
RBC # BLD AUTO: 4.71 10*6/MM3 (ref 3.77–5.28)
SODIUM SERPL-SCNC: 139 MMOL/L (ref 136–145)
TIBC SERPL-MCNC: 508 MCG/DL (ref 298–536)
TRANSFERRIN SERPL-MCNC: 341 MG/DL (ref 200–360)
TRIGL SERPL-MCNC: 145 MG/DL (ref 0–150)
VIT B12 BLD-MCNC: 259 PG/ML (ref 211–946)
VLDLC SERPL-MCNC: 25 MG/DL (ref 5–40)
WBC # BLD AUTO: 11.2 10*3/MM3 (ref 3.4–10.8)

## 2020-12-09 PROCEDURE — 36415 COLL VENOUS BLD VENIPUNCTURE: CPT

## 2020-12-09 PROCEDURE — 80053 COMPREHEN METABOLIC PANEL: CPT

## 2020-12-09 PROCEDURE — 82728 ASSAY OF FERRITIN: CPT

## 2020-12-09 PROCEDURE — 80061 LIPID PANEL: CPT

## 2020-12-09 PROCEDURE — 84466 ASSAY OF TRANSFERRIN: CPT

## 2020-12-09 PROCEDURE — 85025 COMPLETE CBC W/AUTO DIFF WBC: CPT

## 2020-12-09 PROCEDURE — 82607 VITAMIN B-12: CPT

## 2020-12-09 PROCEDURE — 83540 ASSAY OF IRON: CPT

## 2020-12-10 RX ORDER — DOXYCYCLINE HYCLATE 50 MG/1
1 CAPSULE, GELATIN COATED ORAL 2 TIMES DAILY WITH MEALS
Qty: 60 TABLET | Refills: 3 | Status: SHIPPED | OUTPATIENT
Start: 2020-12-10 | End: 2021-06-11

## 2020-12-10 NOTE — PROGRESS NOTES
Iron saturation slightly low.  Continue oral iron as prescribed.  Other labs remain within normal limits or stable.  Follow-up as scheduled.

## 2021-01-04 RX ORDER — HYDROXYZINE PAMOATE 25 MG/1
CAPSULE ORAL
Qty: 30 CAPSULE | Refills: 1 | Status: SHIPPED | OUTPATIENT
Start: 2021-01-04 | End: 2021-02-24

## 2021-01-22 RX ORDER — CYCLOBENZAPRINE HCL 10 MG
TABLET ORAL
Qty: 90 TABLET | Refills: 5 | Status: SHIPPED | OUTPATIENT
Start: 2021-01-22 | End: 2021-06-28

## 2021-02-17 RX ORDER — BUSPIRONE HYDROCHLORIDE 7.5 MG/1
TABLET ORAL
Qty: 270 TABLET | Refills: 2 | Status: SHIPPED | OUTPATIENT
Start: 2021-02-17

## 2021-02-17 RX ORDER — TOPIRAMATE 100 MG/1
TABLET, FILM COATED ORAL
Qty: 90 TABLET | Refills: 2 | Status: SHIPPED | OUTPATIENT
Start: 2021-02-17

## 2021-02-24 RX ORDER — HYDROXYZINE PAMOATE 25 MG/1
CAPSULE ORAL
Qty: 30 CAPSULE | Refills: 1 | Status: SHIPPED | OUTPATIENT
Start: 2021-02-24 | End: 2021-05-03

## 2021-03-15 RX ORDER — LAMOTRIGINE 200 MG/1
TABLET ORAL
Qty: 90 TABLET | Refills: 1 | Status: SHIPPED | OUTPATIENT
Start: 2021-03-15 | End: 2021-09-02

## 2021-03-25 RX ORDER — ROPINIROLE 0.25 MG/1
TABLET, FILM COATED ORAL
Qty: 90 TABLET | Refills: 3 | Status: SHIPPED | OUTPATIENT
Start: 2021-03-25 | End: 2022-03-07

## 2021-04-16 ENCOUNTER — OFFICE VISIT (OUTPATIENT)
Dept: FAMILY MEDICINE CLINIC | Facility: CLINIC | Age: 61
End: 2021-04-16

## 2021-04-16 ENCOUNTER — LAB (OUTPATIENT)
Dept: LAB | Facility: HOSPITAL | Age: 61
End: 2021-04-16

## 2021-04-16 VITALS
TEMPERATURE: 98.2 F | HEART RATE: 93 BPM | OXYGEN SATURATION: 98 % | RESPIRATION RATE: 18 BRPM | BODY MASS INDEX: 27.57 KG/M2 | DIASTOLIC BLOOD PRESSURE: 72 MMHG | SYSTOLIC BLOOD PRESSURE: 122 MMHG | WEIGHT: 155.6 LBS | HEIGHT: 63 IN

## 2021-04-16 DIAGNOSIS — I10 ESSENTIAL HYPERTENSION: Primary | ICD-10-CM

## 2021-04-16 DIAGNOSIS — Z00.00 ANNUAL PHYSICAL EXAM: ICD-10-CM

## 2021-04-16 DIAGNOSIS — J44.9 CHRONIC OBSTRUCTIVE PULMONARY DISEASE, UNSPECIFIED COPD TYPE (HCC): ICD-10-CM

## 2021-04-16 DIAGNOSIS — Z11.59 NEED FOR HEPATITIS C SCREENING TEST: ICD-10-CM

## 2021-04-16 DIAGNOSIS — K21.9 GASTROESOPHAGEAL REFLUX DISEASE WITHOUT ESOPHAGITIS: ICD-10-CM

## 2021-04-16 DIAGNOSIS — E61.1 IRON DEFICIENCY: ICD-10-CM

## 2021-04-16 DIAGNOSIS — D22.9 ATYPICAL MOLE: ICD-10-CM

## 2021-04-16 PROBLEM — J45.909 ASTHMA: Status: ACTIVE | Noted: 2021-04-16

## 2021-04-16 PROBLEM — H54.7 VISUAL IMPAIRMENT: Status: ACTIVE | Noted: 2021-04-16

## 2021-04-16 PROBLEM — R12 HEARTBURN: Status: ACTIVE | Noted: 2021-04-16

## 2021-04-16 PROBLEM — F32.A DEPRESSION: Status: ACTIVE | Noted: 2021-04-16

## 2021-04-16 PROBLEM — M19.90 ARTHRITIS: Status: ACTIVE | Noted: 2021-04-16

## 2021-04-16 PROCEDURE — 85025 COMPLETE CBC W/AUTO DIFF WBC: CPT

## 2021-04-16 PROCEDURE — 82607 VITAMIN B-12: CPT

## 2021-04-16 PROCEDURE — 99214 OFFICE O/P EST MOD 30 MIN: CPT | Performed by: NURSE PRACTITIONER

## 2021-04-16 PROCEDURE — 84466 ASSAY OF TRANSFERRIN: CPT

## 2021-04-16 PROCEDURE — 83036 HEMOGLOBIN GLYCOSYLATED A1C: CPT

## 2021-04-16 PROCEDURE — 82728 ASSAY OF FERRITIN: CPT

## 2021-04-16 PROCEDURE — 83540 ASSAY OF IRON: CPT

## 2021-04-16 PROCEDURE — 84443 ASSAY THYROID STIM HORMONE: CPT

## 2021-04-16 PROCEDURE — 86803 HEPATITIS C AB TEST: CPT

## 2021-04-16 PROCEDURE — 80053 COMPREHEN METABOLIC PANEL: CPT

## 2021-04-16 PROCEDURE — 80061 LIPID PANEL: CPT

## 2021-04-16 PROCEDURE — 84439 ASSAY OF FREE THYROXINE: CPT

## 2021-04-16 PROCEDURE — 36415 COLL VENOUS BLD VENIPUNCTURE: CPT

## 2021-04-16 RX ORDER — ASCORBIC ACID 250 MG
500 TABLET ORAL DAILY
COMMUNITY

## 2021-04-16 RX ORDER — BUDESONIDE AND FORMOTEROL FUMARATE DIHYDRATE 160; 4.5 UG/1; UG/1
2 AEROSOL RESPIRATORY (INHALATION)
Qty: 10.2 G | Refills: 12 | Status: SHIPPED | OUTPATIENT
Start: 2021-04-16 | End: 2022-03-30

## 2021-04-16 RX ORDER — ERGOCALCIFEROL 1.25 MG/1
50000 CAPSULE ORAL
COMMUNITY
End: 2021-06-11

## 2021-04-16 RX ORDER — DESOXIMETASONE 0.5 MG/G
OINTMENT TOPICAL
COMMUNITY
Start: 2021-01-30 | End: 2021-06-11

## 2021-04-16 RX ORDER — FERROUS SULFATE 325(65) MG
325 TABLET ORAL
COMMUNITY
End: 2021-04-16 | Stop reason: HOSPADM

## 2021-04-16 RX ORDER — DEXLANSOPRAZOLE 60 MG/1
60 CAPSULE, DELAYED RELEASE ORAL DAILY
Qty: 30 CAPSULE | Refills: 0 | Status: SHIPPED | OUTPATIENT
Start: 2021-04-16 | End: 2021-05-11

## 2021-04-16 NOTE — PROGRESS NOTES
Subjective   Ga Mendoza is a 60 y.o. female.     CC: Annual follow-up-hypertension, iron deficiency, COPD, GERD, atypical mole    Hypertension  This is a chronic problem. The current episode started more than 1 year ago. The problem is controlled. Associated symptoms include shortness of breath (mild increased in SOA reported, no chest pain). Pertinent negatives include no chest pain, headaches, malaise/fatigue or palpitations. Risk factors for coronary artery disease include family history, dyslipidemia, smoking/tobacco exposure and sedentary lifestyle. Current antihypertension treatment includes beta blockers and ACE inhibitors. The current treatment provides significant improvement. Compliance problems include exercise and diet.  There is no history of angina, kidney disease or CAD/MI.   Anemia  Presents for follow-up visit. There has been no abdominal pain, anorexia, bruising/bleeding easily, confusion, fever, leg swelling, light-headedness, malaise/fatigue, pallor, palpitations, paresthesias, pica or weight loss. Signs of blood loss that are not present include hematemesis, hematochezia, melena, menorrhagia and vaginal bleeding. Compliance problems include medication side effects.  Compliance with medications is 0-25%. Side effects of medications include GI discomfort.   COPD  She complains of shortness of breath (mild increased in SOA reported, no chest pain). There is no chest tightness, cough or wheezing. This is a chronic problem. The current episode started more than 1 year ago. The problem occurs intermittently. The problem has been waxing and waning. Associated symptoms include dyspnea on exertion and heartburn. Pertinent negatives include no appetite change, chest pain, fever, headaches, malaise/fatigue, myalgias, orthopnea, sore throat, trouble swallowing or weight loss. Her symptoms are aggravated by minimal activity, URI, change in weather and exposure to smoke. Her symptoms are alleviated  by rest and beta-agonist. She reports moderate improvement on treatment. Risk factors for lung disease include smoking/tobacco exposure. Her past medical history is significant for COPD.   Heartburn  She complains of heartburn. She reports no abdominal pain, no chest pain, no coughing, no nausea, no sore throat or no wheezing. This is a chronic problem. The current episode started more than 1 year ago. The problem occurs frequently. The problem has been waxing and waning. The heartburn duration is several minutes. The heartburn is located in the substernum. The heartburn is of moderate intensity. The heartburn does not wake her from sleep. The heartburn does not limit her activity. The heartburn changes with position. The symptoms are aggravated by certain foods and lying down. Pertinent negatives include no anemia, fatigue, melena, muscle weakness, orthopnea or weight loss. Risk factors include smoking/tobacco exposure. She has tried a PPI and a histamine-2 antagonist for the symptoms. The treatment provided mild relief.        The following portions of the patient's history were reviewed and updated as appropriate: allergies, current medications, past family history, past medical history, past social history, past surgical history and problem list.    Review of Systems   Constitutional: Negative for activity change, appetite change, fatigue, fever, malaise/fatigue, unexpected weight gain and unexpected weight loss.   HENT: Negative for congestion, sore throat, trouble swallowing and voice change.    Eyes: Negative.    Respiratory: Positive for shortness of breath (mild increased in SOA reported, no chest pain). Negative for cough, chest tightness and wheezing.    Cardiovascular: Positive for dyspnea on exertion. Negative for chest pain, palpitations and leg swelling.   Gastrointestinal: Positive for GERD. Negative for abdominal pain, anorexia, constipation, diarrhea, hematemesis, hematochezia, melena, nausea and  vomiting.   Endocrine: Negative.    Genitourinary: Negative for dysuria, menorrhagia and vaginal bleeding.   Musculoskeletal: Negative for arthralgias, myalgias and muscle weakness.   Skin: Positive for skin lesions (reports mole on left torso that has become rough and englarged in size). Negative for pallor and rash.   Allergic/Immunologic: Negative.    Neurological: Negative.  Negative for light-headedness, paresthesias and confusion.   Hematological: Negative.  Does not bruise/bleed easily.   Psychiatric/Behavioral: Negative.        Objective   Physical Exam  Vitals and nursing note reviewed.   Constitutional:       General: She is not in acute distress.     Appearance: Normal appearance. She is well-developed. She is not ill-appearing, toxic-appearing or diaphoretic.   HENT:      Head: Normocephalic and atraumatic.   Eyes:      Conjunctiva/sclera: Conjunctivae normal.   Cardiovascular:      Rate and Rhythm: Normal rate and regular rhythm.      Heart sounds: Normal heart sounds.   Pulmonary:      Effort: Pulmonary effort is normal. No respiratory distress.      Breath sounds: Normal breath sounds. No stridor. No wheezing, rhonchi or rales.   Abdominal:      General: Bowel sounds are normal. There is no distension.      Palpations: There is no mass.      Tenderness: There is no abdominal tenderness. There is no guarding or rebound.      Hernia: No hernia is present.   Musculoskeletal:         General: No tenderness. Normal range of motion.      Cervical back: Normal range of motion.   Skin:     General: Skin is warm and dry.      Coloration: Skin is not pale.      Findings: No erythema or rash.          Neurological:      Mental Status: She is alert and oriented to person, place, and time.   Psychiatric:         Mood and Affect: Mood normal.         Behavior: Behavior normal.         Thought Content: Thought content normal.         Judgment: Judgment normal.           Assessment/Plan   Diagnoses and all orders for  this visit:    1. Essential hypertension (Primary)   -Controlled.  Continue lisinopril, Lopressor as prescribed.  We will continue to monitor.    2. Iron deficiency  -     CBC & Differential; Future  -     Ferritin; Future  -     Vitamin B12; Future  -     Iron Profile; Future, will call with results.    3. Need for hepatitis C screening test  -     Hepatitis C Antibody; Future, will call with results.    4. Annual physical exam  -     CBC & Differential; Future  -     Ferritin; Future  -     Vitamin B12; Future  -     Iron Profile; Future  -     Hepatitis C Antibody; Future  -     Comprehensive Metabolic Panel; Future  -     Hemoglobin A1c; Future  -     Lipid Panel; Future  -     TSH; Future  -     T4, Free; Future, will call with results.    5. Chronic obstructive pulmonary disease, unspecified COPD type (CMS/HCC)  -    Patient reports slight increase in shortness of breath with activity.  Will increase Symbicort to 160-4.5 mcg per ACT 2 puffs twice daily.  Smoking cessation strongly encouraged.  We will continue to monitor.  -Budesonide-formoterol (Symbicort) 160-4.5 MCG/ACT inhaler; Inhale 2 puffs 2 (Two) Times a Day.  Dispense: 10.2 g; Refill: 12    6. Atypical mole  -     Ambulatory Referral to Dermatology, follow-up as scheduled.    7. Gastroesophageal reflux disease without esophagitis  -   Patient has tried multiple other medications including Protonix, Nexium and famotidine.  These have not been efficacious.  We will start a trial of Dexilant 60 mg 1 capsule daily.  Avoid GERD trigger foods.  We will continue to monitor.  -Dexlansoprazole (Dexilant) 60 MG capsule; Take 1 capsule by mouth Daily for 30 days.  Dispense: 30 capsule; Refill: 0    8.  Follow-up in 6 months or sooner for any acute needs.            This document has been electronically signed by ROSEMARY Mccabe on April 16, 2021 16:42 CDT

## 2021-04-17 LAB
ALBUMIN SERPL-MCNC: 4.6 G/DL (ref 3.5–5.2)
ALBUMIN/GLOB SERPL: 2 G/DL
ALP SERPL-CCNC: 125 U/L (ref 39–117)
ALT SERPL W P-5'-P-CCNC: 9 U/L (ref 1–33)
ANION GAP SERPL CALCULATED.3IONS-SCNC: 10.2 MMOL/L (ref 5–15)
AST SERPL-CCNC: 12 U/L (ref 1–32)
BASOPHILS # BLD AUTO: 0.1 10*3/MM3 (ref 0–0.2)
BASOPHILS NFR BLD AUTO: 0.8 % (ref 0–1.5)
BILIRUB SERPL-MCNC: 0.2 MG/DL (ref 0–1.2)
BUN SERPL-MCNC: 13 MG/DL (ref 6–20)
BUN/CREAT SERPL: 13.7 (ref 7–25)
CALCIUM SPEC-SCNC: 9.9 MG/DL (ref 8.6–10.5)
CHLORIDE SERPL-SCNC: 103 MMOL/L (ref 98–107)
CHOLEST SERPL-MCNC: 217 MG/DL (ref 0–200)
CO2 SERPL-SCNC: 25.8 MMOL/L (ref 22–29)
CREAT SERPL-MCNC: 0.95 MG/DL (ref 0.57–1)
DEPRECATED RDW RBC AUTO: 43.9 FL (ref 37–54)
EOSINOPHIL # BLD AUTO: 0.14 10*3/MM3 (ref 0–0.4)
EOSINOPHIL NFR BLD AUTO: 1.2 % (ref 0.3–6.2)
ERYTHROCYTE [DISTWIDTH] IN BLOOD BY AUTOMATED COUNT: 13.7 % (ref 12.3–15.4)
FERRITIN SERPL-MCNC: 14.7 NG/ML (ref 13–150)
GFR SERPL CREATININE-BSD FRML MDRD: 60 ML/MIN/1.73
GLOBULIN UR ELPH-MCNC: 2.3 GM/DL
GLUCOSE SERPL-MCNC: 83 MG/DL (ref 65–99)
HBA1C MFR BLD: 4.7 % (ref 4.8–5.6)
HCT VFR BLD AUTO: 41.9 % (ref 34–46.6)
HCV AB SER DONR QL: NORMAL
HDLC SERPL-MCNC: 47 MG/DL (ref 40–60)
HGB BLD-MCNC: 13.9 G/DL (ref 12–15.9)
IMM GRANULOCYTES # BLD AUTO: 0.06 10*3/MM3 (ref 0–0.05)
IMM GRANULOCYTES NFR BLD AUTO: 0.5 % (ref 0–0.5)
IRON 24H UR-MRATE: 42 MCG/DL (ref 37–145)
IRON SATN MFR SERPL: 8 % (ref 20–50)
LDLC SERPL CALC-MCNC: 151 MG/DL (ref 0–100)
LDLC/HDLC SERPL: 3.17 {RATIO}
LYMPHOCYTES # BLD AUTO: 3.37 10*3/MM3 (ref 0.7–3.1)
LYMPHOCYTES NFR BLD AUTO: 28.4 % (ref 19.6–45.3)
MCH RBC QN AUTO: 29 PG (ref 26.6–33)
MCHC RBC AUTO-ENTMCNC: 33.2 G/DL (ref 31.5–35.7)
MCV RBC AUTO: 87.5 FL (ref 79–97)
MONOCYTES # BLD AUTO: 0.74 10*3/MM3 (ref 0.1–0.9)
MONOCYTES NFR BLD AUTO: 6.2 % (ref 5–12)
NEUTROPHILS NFR BLD AUTO: 62.9 % (ref 42.7–76)
NEUTROPHILS NFR BLD AUTO: 7.47 10*3/MM3 (ref 1.7–7)
NRBC BLD AUTO-RTO: 0.1 /100 WBC (ref 0–0.2)
PLATELET # BLD AUTO: 396 10*3/MM3 (ref 140–450)
PMV BLD AUTO: 9.6 FL (ref 6–12)
POTASSIUM SERPL-SCNC: 4.2 MMOL/L (ref 3.5–5.2)
PROT SERPL-MCNC: 6.9 G/DL (ref 6–8.5)
RBC # BLD AUTO: 4.79 10*6/MM3 (ref 3.77–5.28)
SODIUM SERPL-SCNC: 139 MMOL/L (ref 136–145)
T4 FREE SERPL-MCNC: 1.34 NG/DL (ref 0.93–1.7)
TIBC SERPL-MCNC: 504 MCG/DL (ref 298–536)
TRANSFERRIN SERPL-MCNC: 338 MG/DL (ref 200–360)
TRIGL SERPL-MCNC: 105 MG/DL (ref 0–150)
TSH SERPL DL<=0.05 MIU/L-ACNC: 1.31 UIU/ML (ref 0.27–4.2)
VIT B12 BLD-MCNC: 283 PG/ML (ref 211–946)
VLDLC SERPL-MCNC: 19 MG/DL (ref 5–40)
WBC # BLD AUTO: 11.88 10*3/MM3 (ref 3.4–10.8)

## 2021-04-19 NOTE — PROGRESS NOTES
Iron is decreased.  Ask if she is taking it as prescribed.  Slight elevation in cholesterol.  Low-fat diet.  Slightly low hemoglobin A1c.  Make sure she is eating on a routine basis.  Follow-up as scheduled.

## 2021-04-23 ENCOUNTER — TELEPHONE (OUTPATIENT)
Dept: FAMILY MEDICINE CLINIC | Facility: CLINIC | Age: 61
End: 2021-04-23

## 2021-04-23 DIAGNOSIS — E61.1 IRON DEFICIENCY: Primary | ICD-10-CM

## 2021-04-23 NOTE — TELEPHONE ENCOUNTER
Patient was called to let her know a referral was placed to Hematology for further evaluation/treatment.         ----- Message from ROSEMARY Mccabe sent at 4/23/2021 11:09 AM CDT -----  Regarding: RE: lab  Since she has intolerance to oral iron have placed a referral to hematology to evaluate her to see if she is a candidate for iron infusions.  ----- Message -----  From: Gisele Vance MA  Sent: 4/21/2021   1:49 PM CDT  To: ROSEMARY Mccabe  Subject: lab                                              Patient states she does not take the iron supplements; makes her sick.

## 2021-05-03 RX ORDER — HYDROXYZINE PAMOATE 25 MG/1
CAPSULE ORAL
Qty: 30 CAPSULE | Refills: 1 | Status: SHIPPED | OUTPATIENT
Start: 2021-05-03 | End: 2021-07-02

## 2021-05-03 RX ORDER — LISINOPRIL 40 MG/1
TABLET ORAL
Qty: 90 TABLET | Refills: 3 | Status: SHIPPED | OUTPATIENT
Start: 2021-05-03 | End: 2022-04-28

## 2021-05-03 RX ORDER — VORTIOXETINE 20 MG/1
TABLET, FILM COATED ORAL
Qty: 90 TABLET | Refills: 3 | Status: SHIPPED | OUTPATIENT
Start: 2021-05-03 | End: 2022-04-28

## 2021-05-03 RX ORDER — ROSUVASTATIN CALCIUM 20 MG/1
TABLET, COATED ORAL
Qty: 90 TABLET | Refills: 3 | Status: SHIPPED | OUTPATIENT
Start: 2021-05-03 | End: 2022-04-20

## 2021-05-07 ENCOUNTER — CONSULT (OUTPATIENT)
Dept: ONCOLOGY | Facility: CLINIC | Age: 61
End: 2021-05-07

## 2021-05-07 ENCOUNTER — LAB (OUTPATIENT)
Dept: ONCOLOGY | Facility: HOSPITAL | Age: 61
End: 2021-05-07

## 2021-05-07 VITALS
WEIGHT: 157 LBS | BODY MASS INDEX: 27.82 KG/M2 | HEIGHT: 63 IN | TEMPERATURE: 98.3 F | DIASTOLIC BLOOD PRESSURE: 78 MMHG | SYSTOLIC BLOOD PRESSURE: 167 MMHG | RESPIRATION RATE: 16 BRPM | HEART RATE: 86 BPM

## 2021-05-07 DIAGNOSIS — Z12.2 ENCOUNTER FOR SCREENING FOR LUNG CANCER: ICD-10-CM

## 2021-05-07 DIAGNOSIS — E61.1 IRON DEFICIENCY: Primary | ICD-10-CM

## 2021-05-07 DIAGNOSIS — R53.83 OTHER FATIGUE: ICD-10-CM

## 2021-05-07 DIAGNOSIS — E53.8 FOLATE DEFICIENCY: ICD-10-CM

## 2021-05-07 DIAGNOSIS — E61.1 IRON DEFICIENCY: ICD-10-CM

## 2021-05-07 PROBLEM — T45.4X5A ADVERSE EFFECT OF IRON: Status: ACTIVE | Noted: 2021-05-07

## 2021-05-07 LAB
BASOPHILS # BLD AUTO: 0.1 10*3/MM3 (ref 0–0.2)
BASOPHILS NFR BLD AUTO: 1 % (ref 0–1.5)
DEPRECATED RDW RBC AUTO: 46.5 FL (ref 37–54)
EOSINOPHIL # BLD AUTO: 0.15 10*3/MM3 (ref 0–0.4)
EOSINOPHIL NFR BLD AUTO: 1.4 % (ref 0.3–6.2)
ERYTHROCYTE [DISTWIDTH] IN BLOOD BY AUTOMATED COUNT: 14.4 % (ref 12.3–15.4)
FERRITIN SERPL-MCNC: 12.4 NG/ML (ref 13–150)
FOLATE SERPL-MCNC: 3.58 NG/ML (ref 4.78–24.2)
HCT VFR BLD AUTO: 44.8 % (ref 34–46.6)
HGB BLD-MCNC: 14.1 G/DL (ref 12–15.9)
HOLD SPECIMEN: NORMAL
IMM GRANULOCYTES # BLD AUTO: 0.04 10*3/MM3 (ref 0–0.05)
IMM GRANULOCYTES NFR BLD AUTO: 0.4 % (ref 0–0.5)
IRON 24H UR-MRATE: 27 MCG/DL (ref 37–145)
IRON SATN MFR SERPL: 5 % (ref 20–50)
LYMPHOCYTES # BLD AUTO: 2.43 10*3/MM3 (ref 0.7–3.1)
LYMPHOCYTES NFR BLD AUTO: 23.5 % (ref 19.6–45.3)
MCH RBC QN AUTO: 27.6 PG (ref 26.6–33)
MCHC RBC AUTO-ENTMCNC: 31.5 G/DL (ref 31.5–35.7)
MCV RBC AUTO: 87.7 FL (ref 79–97)
MONOCYTES # BLD AUTO: 0.71 10*3/MM3 (ref 0.1–0.9)
MONOCYTES NFR BLD AUTO: 6.9 % (ref 5–12)
NEUTROPHILS NFR BLD AUTO: 6.93 10*3/MM3 (ref 1.7–7)
NEUTROPHILS NFR BLD AUTO: 66.8 % (ref 42.7–76)
NRBC BLD AUTO-RTO: 0 /100 WBC (ref 0–0.2)
PLATELET # BLD AUTO: 356 10*3/MM3 (ref 140–450)
PMV BLD AUTO: 9 FL (ref 6–12)
RBC # BLD AUTO: 5.11 10*6/MM3 (ref 3.77–5.28)
TIBC SERPL-MCNC: 519 MCG/DL (ref 298–536)
TRANSFERRIN SERPL-MCNC: 348 MG/DL (ref 200–360)
VIT B12 BLD-MCNC: 258 PG/ML (ref 211–946)
WBC # BLD AUTO: 10.36 10*3/MM3 (ref 3.4–10.8)

## 2021-05-07 PROCEDURE — 99204 OFFICE O/P NEW MOD 45 MIN: CPT | Performed by: INTERNAL MEDICINE

## 2021-05-07 PROCEDURE — 82728 ASSAY OF FERRITIN: CPT | Performed by: INTERNAL MEDICINE

## 2021-05-07 PROCEDURE — 82607 VITAMIN B-12: CPT | Performed by: INTERNAL MEDICINE

## 2021-05-07 PROCEDURE — G0463 HOSPITAL OUTPT CLINIC VISIT: HCPCS | Performed by: INTERNAL MEDICINE

## 2021-05-07 PROCEDURE — 82746 ASSAY OF FOLIC ACID SERUM: CPT | Performed by: INTERNAL MEDICINE

## 2021-05-07 PROCEDURE — 36415 COLL VENOUS BLD VENIPUNCTURE: CPT | Performed by: INTERNAL MEDICINE

## 2021-05-07 PROCEDURE — 83540 ASSAY OF IRON: CPT | Performed by: INTERNAL MEDICINE

## 2021-05-07 PROCEDURE — 84466 ASSAY OF TRANSFERRIN: CPT | Performed by: INTERNAL MEDICINE

## 2021-05-07 PROCEDURE — 85025 COMPLETE CBC W/AUTO DIFF WBC: CPT | Performed by: INTERNAL MEDICINE

## 2021-05-07 RX ORDER — ACETAMINOPHEN 325 MG/1
650 TABLET ORAL ONCE
Status: CANCELLED | OUTPATIENT
Start: 2021-05-17

## 2021-05-07 RX ORDER — SODIUM CHLORIDE 9 MG/ML
250 INJECTION, SOLUTION INTRAVENOUS ONCE
Status: CANCELLED | OUTPATIENT
Start: 2021-05-17

## 2021-05-07 RX ORDER — DIPHENHYDRAMINE HYDROCHLORIDE 50 MG/ML
25 INJECTION INTRAMUSCULAR; INTRAVENOUS ONCE
Status: CANCELLED | OUTPATIENT
Start: 2021-05-17

## 2021-05-08 PROBLEM — E53.8 FOLATE DEFICIENCY: Status: ACTIVE | Noted: 2021-05-08

## 2021-05-08 PROBLEM — Z12.2 ENCOUNTER FOR SCREENING FOR LUNG CANCER: Status: ACTIVE | Noted: 2021-05-08

## 2021-05-08 RX ORDER — FOLIC ACID 1 MG/1
1 TABLET ORAL DAILY
Qty: 90 TABLET | Refills: 1 | Status: SHIPPED | OUTPATIENT
Start: 2021-05-08 | End: 2021-11-12 | Stop reason: SDUPTHER

## 2021-05-08 RX ORDER — LANOLIN ALCOHOL/MO/W.PET/CERES
1000 CREAM (GRAM) TOPICAL DAILY
Qty: 90 TABLET | Refills: 1 | Status: SHIPPED | OUTPATIENT
Start: 2021-05-08 | End: 2021-05-10 | Stop reason: SDUPTHER

## 2021-05-10 ENCOUNTER — TELEPHONE (OUTPATIENT)
Dept: ONCOLOGY | Facility: HOSPITAL | Age: 61
End: 2021-05-10

## 2021-05-10 ENCOUNTER — TELEPHONE (OUTPATIENT)
Dept: ONCOLOGY | Facility: CLINIC | Age: 61
End: 2021-05-10

## 2021-05-10 RX ORDER — LANOLIN ALCOHOL/MO/W.PET/CERES
1000 CREAM (GRAM) TOPICAL DAILY
Qty: 90 TABLET | Refills: 1 | Status: SHIPPED | OUTPATIENT
Start: 2021-05-10 | End: 2021-06-11

## 2021-05-10 NOTE — TELEPHONE ENCOUNTER
----- Message from Po Park MD sent at 5/8/2021  9:48 AM CDT -----  Please let patient know, her iron level is no worse than normal.  I have ordered intravenous iron to be started next week.  Her B12 level is borderline at 258.  Her folate level is lower than normal at 3.58.  I have sent prescription for B12 and folic acid to her pharmacy.  Thank you

## 2021-05-11 ENCOUNTER — OFFICE VISIT (OUTPATIENT)
Dept: GASTROENTEROLOGY | Facility: CLINIC | Age: 61
End: 2021-05-11

## 2021-05-11 VITALS
HEART RATE: 101 BPM | SYSTOLIC BLOOD PRESSURE: 166 MMHG | WEIGHT: 157.8 LBS | DIASTOLIC BLOOD PRESSURE: 90 MMHG | BODY MASS INDEX: 27.96 KG/M2 | HEIGHT: 63 IN

## 2021-05-11 DIAGNOSIS — D50.9 IRON DEFICIENCY ANEMIA, UNSPECIFIED IRON DEFICIENCY ANEMIA TYPE: ICD-10-CM

## 2021-05-11 DIAGNOSIS — Z86.010 PERSONAL HISTORY OF COLONIC POLYPS: ICD-10-CM

## 2021-05-11 DIAGNOSIS — K21.9 GASTROESOPHAGEAL REFLUX DISEASE, UNSPECIFIED WHETHER ESOPHAGITIS PRESENT: Primary | ICD-10-CM

## 2021-05-11 PROCEDURE — 99213 OFFICE O/P EST LOW 20 MIN: CPT | Performed by: NURSE PRACTITIONER

## 2021-05-11 RX ORDER — DEXTROSE AND SODIUM CHLORIDE 5; .45 G/100ML; G/100ML
30 INJECTION, SOLUTION INTRAVENOUS CONTINUOUS PRN
Status: CANCELLED | OUTPATIENT
Start: 2021-06-15

## 2021-05-11 NOTE — PROGRESS NOTES
Chief Complaint   Patient presents with   • Anemia       Subjective    Ga Mendoza is a 60 y.o. female. she is here today  60-year-old female presents to discuss anemia.  She is being followed by hematologist and undergoing iron infusion due to decreased iron saturation of 8% and inability to tolerate oral iron.  Reports chronic reflux she had Nissen fundoplication around 2005 and states she was doing very well until she was tried on a new antidepressant and it flared up symptoms and have not been able to get back under control she was on Nexium which her insurance stopped covering and has not been able to get a PPI covered she is now on Pepcid without much relief in symptoms    Anemia  Symptoms include abdominal pain. There has been no fever, light-headedness or pallor. Procedure history includes EGD. hiatal hernia repair 2005.   Heartburn  She complains of abdominal pain and nausea. She reports no sore throat. Associated symptoms include fatigue. She has tried a PPI and a histamine-2 antagonist (tried dexilant-not approved by ins, nexium and prilosec) for the symptoms. The treatment provided moderate relief. Past procedures include an EGD. hiatal hernia repair 2005.   Previous colonoscopy completed 2/14/2019 and noted 8 polyps which were removed from sigmoid colon and rectum they were adenomatous and hyperplastic.    Plan; schedule patient for EGD and colonoscopy due to iron deficiency personal history of colonic polyps and chronic reflux.       The following portions of the patient's history were reviewed and updated as appropriate:   Past Medical History:   Diagnosis Date   • Anemia    • Anxiety     Anxiety state      • Bipolar affective disorder, current episode depressed (CMS/HCC)    • Bipolar disorder (CMS/HCC)    • Breast cyst    • Drug therapy     Long-term drug therapy      • Essential hypertension    • Headache    • Hyperlipidemia    • Influenza    • Liver function test abnormality     Abnormal  liver test   • Neuropathic pain    • Obesity    • Pain of breast     No evidence of cancer      • Urinary tract infectious disease      Past Surgical History:   Procedure Laterality Date   • APPENDECTOMY      Appendectomy (1)      • BREAST BIOPSY     • COLONOSCOPY N/A 2019    Procedure: COLONOSCOPY;  Surgeon: Jaiyeola, Aderemi, MD;  Location: [facility] ENDOSCOPY;  Service: General   • DIAGNOSTIC LAPAROSCOPY  2003    Laparosc diagnostic (1)      • ENDOSCOPY W/ PEG TUBE PLACEMENT  2003    EGD w/ tube 97254 (1)      • LAPAROSCOPY ESOPHAGOGASTRIC FUNDOPLASTY HYBRID  02/10/2005    Fundoplasty, laparoscopic (1)      • PAP SMEAR  2009    PAP SMEAR (1)      • TOTAL ABDOMINAL HYSTERECTOMY WITH SALPINGO OOPHORECTOMY  2003    JOSE/BSO (1)        Family History   Problem Relation Age of Onset   • Depression Other    • Diabetes Other    • Heart disease Other    • Hypertension Other    • Stroke Other    • Lung disease Other    • Other Other         Colon problems   • COPD Mother    • Heart disease Father    • Lung disease Father    • Hypertension Sister    • Hypertension Brother    • Fibromyalgia Daughter    • Anxiety disorder Son    • Diabetes Brother    • No Known Problems Daughter      OB History        3    Para   3    Term   3            AB        Living           SAB        TAB        Ectopic        Molar        Multiple        Live Births                  Prior to Admission medications    Medication Sig Start Date End Date Taking? Authorizing Provider   albuterol (ACCUNEB) 1.25 MG/3ML nebulizer solution Take 3 mL by nebulization Every 6 (Six) Hours As Needed for Wheezing or Shortness of Air. 20  Yes Nikunj Lipscomb APRNATHALIE   albuterol sulfate HFA (ProAir HFA) 108 (90 Base) MCG/ACT inhaler Inhale 2 puffs Every 4 (Four) Hours As Needed for Wheezing or Shortness of Air. 20  Yes Nikunj Lipscomb APRNATHALIE   amitriptyline (ELAVIL) 75 MG tablet Take 1 tablet by mouth Every Night.  4/6/20  Yes Nikunj Lipscomb APRN   ascorbic acid (VITAMIN C) 250 MG tablet Take 500 mg by mouth Daily.   Yes Gilma Cruz MD   aspirin 81 MG tablet Take 81 mg by mouth.   Yes Gilma Cruz MD   azelastine (ASTELIN) 0.1 % nasal spray 2 sprays into the nostril(s) as directed by provider As Needed for Rhinitis. Use in each nostril as directed   Yes Gilma Cruz MD   B Complex Vitamins (VITAMIN B COMPLEX PO) Take 1 tablet by mouth Daily.   Yes Gilma Cruz MD   Biotin 1000 MCG tablet Take 1,000 mcg by mouth.   Yes Gilma Cruz MD   budesonide-formoterol (Symbicort) 160-4.5 MCG/ACT inhaler Inhale 2 puffs 2 (Two) Times a Day. 4/16/21  Yes Nikunj Lipscomb APRN   Buprenorphine 20 MCG/HR patch weekly Place 1 patch on the skin 1 (One) Time Per Week. 8/23/17  Yes Carlos Mchugh MD   busPIRone (BUSPAR) 7.5 MG tablet TAKE 1 TABLET 3 TIMES A DAY 2/17/21  Yes Nikunj Lipscomb APRN   Calcium Citrate-Vitamin D (CALCIUM CITRATE + D PO) Take 1 tablet by mouth Daily.   Yes Gilma Cruz MD   Cholecalciferol (VITAMIN D3) 1000 units capsule  4/10/19  Yes Gilma Cruz MD   COLLAGEN PO Take 1,000 mg by mouth Daily.   Yes Gilma Cruz MD   cyclobenzaprine (FLEXERIL) 10 MG tablet TAKE 1 TABLET 3 TIMES A DAYAS NEEDED FOR MUSCLE       SPASMS 1/22/21  Yes Nikunj Lipscomb APRN   Desoximetasone 0.05 % ointment  1/30/21  Yes Gilma Cruz MD   dexlansoprazole (Dexilant) 60 MG capsule Take 1 capsule by mouth Daily for 30 days. 4/16/21 5/16/21 Yes Nikunj Lipscomb APRN   ergocalciferol (ERGOCALCIFEROL) 1.25 MG (10845 UT) capsule Take 50,000 Units by mouth.   Yes Gilma Cruz MD   ferrous gluconate (FERGON) 324 MG tablet Take 1 tablet by mouth 2 (Two) Times a Day With Meals. 12/10/20  Yes Nikunj Lipscomb APRN   fluticasone (FLONASE) 50 MCG/ACT nasal spray 2 sprays into the nostril(s) as directed by provider Daily. Administer 2 sprays in each  nostril for each dose. 4/6/20  Yes Nikunj Lipscomb APRN   folic acid (FOLVITE) 1 MG tablet Take 1 tablet by mouth Daily. 5/8/21  Yes Po Park MD   hydrOXYzine pamoate (VISTARIL) 25 MG capsule TAKE 1 CAPSULE EVERY NIGHT AT BEDTIME 5/3/21  Yes Nikunj Lipscomb APRN   lamoTRIgine (LaMICtal) 200 MG tablet TAKE 1 TABLET DAILY. 3/15/21  Yes Nikunj Lipscomb APRN   lisinopril (PRINIVIL,ZESTRIL) 40 MG tablet TAKE 1 TABLET DAILY 5/3/21  Yes Nikunj Lipscomb APRN   loratadine (CLARITIN) 10 MG tablet Take 10 mg by mouth.   Yes Gilma Cruz MD   magnesium oxide (MAGOX) 400 (241.3 MG) MG tablet tablet Take 400 mg by mouth Daily.   Yes Gilma Cruz MD   metoprolol tartrate (LOPRESSOR) 25 MG tablet TAKE 1 TABLET TWICE A DAY 5/3/21  Yes Nikunj Lipscomb APRN   Milk Thistle 1000 MG capsule Take  by mouth.   Yes Gilma Cruz MD   Multiple Vitamin (MULTIVITAMIN+ PO) Take 1 tablet by mouth Daily.   Yes Gilma Cruz MD   Naloxegol Oxalate (MOVANTIK) 25 MG tablet Take 25 mg by mouth. 8/20/18  Yes Gilma Cruz MD   Niacin, Antihyperlipidemic, 500 MG tablet Take  by mouth.   Yes Glima Cruz MD   Omega-3 Fatty Acids (OMEGA-3 FISH OIL) 1000 MG capsule Take 1,000 mg by mouth Daily.   Yes Gilma Cruz MD   pregabalin (LYRICA) 200 MG capsule Take 200 mg by mouth 2 (Two) Times a Day. 3/25/19  Yes Gilma Cruz MD   psyllium (METAMUCIL SMOOTH TEXTURE) 28 % packet Take 1 packet by mouth 2 (Two) Times a Day. 2/14/19  Yes Jaiyeola, Aderemi, MD   rOPINIRole (REQUIP) 0.25 MG tablet TAKE 1 TABLET NIGHTLY 1    HOUR BEFORE BEDTIME AS     NEEDED FOR RESTLESS LEG    SYMPTOMS 3/25/21  Yes Nikunj Lipscomb APRN   rosuvastatin (CRESTOR) 20 MG tablet TAKE 1 TABLET DAILY 5/3/21  Yes Nikunj Lipscomb APRN   topiramate (TOPAMAX) 100 MG tablet TAKE 1 TABLET EVERY EVENING 2/17/21  Yes Nikunj Lipscomb APRN   topiramate (TOPAMAX) 50 MG tablet Take 1 tablet by mouth Daily.  "4/6/20  Yes Nikunj Lipscomb APRN   triamcinolone (KENALOG) 0.1 % cream Apply  topically to the appropriate area as directed 2 (Two) Times a Day. 7/8/19  Yes Nikunj Lipscomb APRN   Trintellix 20 MG tablet TAKE 1 TABLET DAILY 5/3/21  Yes Nikunj Lipscomb APRN   vitamin A 39222 UNIT capsule Take 10,000 Units by mouth Daily.   Yes Gilma Cruz MD   vitamin B-12 (CYANOCOBALAMIN) 1000 MCG tablet  4/10/19  Yes Gilma Cruz MD   vitamin B-12 (CYANOCOBALAMIN) 1000 MCG tablet Take 1 tablet by mouth Daily. 5/10/21  Yes Po Park MD   vitamin E 400 UNIT capsule Take 400 Units by mouth Daily.   Yes ProviderGilma MD     No Known Allergies  Social History     Socioeconomic History   • Marital status:      Spouse name: Not on file   • Number of children: Not on file   • Years of education: Not on file   • Highest education level: Not on file   Tobacco Use   • Smoking status: Current Every Day Smoker     Packs/day: 1.00     Years: 25.00     Pack years: 25.00     Types: Cigarettes   • Smokeless tobacco: Never Used   Substance and Sexual Activity   • Alcohol use: No   • Drug use: No   • Sexual activity: Defer       Review of Systems  Review of Systems   Constitutional: Positive for fatigue. Negative for activity change, appetite change, chills, diaphoresis, fever and unexpected weight change.   HENT: Negative for sore throat and trouble swallowing.    Respiratory: Negative for shortness of breath.    Gastrointestinal: Positive for abdominal pain and nausea. Negative for abdominal distention, anal bleeding, blood in stool, constipation, diarrhea, rectal pain and vomiting.   Musculoskeletal: Negative for arthralgias.   Skin: Negative for pallor.   Neurological: Negative for light-headedness.        /90 (BP Location: Left arm)   Pulse 101   Ht 160 cm (63\")   Wt 71.6 kg (157 lb 12.8 oz)   BMI 27.95 kg/m²     Objective    Physical Exam  Constitutional:       General: She is not in " acute distress.     Appearance: Normal appearance. She is well-developed.   HENT:      Head: Normocephalic and atraumatic.   Neck:      Thyroid: No thyromegaly.   Cardiovascular:      Rate and Rhythm: Normal rate and regular rhythm.      Heart sounds: Normal heart sounds.   Pulmonary:      Effort: Pulmonary effort is normal.      Breath sounds: Normal breath sounds. No wheezing, rhonchi or rales.   Abdominal:      General: Bowel sounds are normal. There is no distension.      Palpations: Abdomen is soft. Abdomen is not rigid.      Tenderness: There is abdominal tenderness in the epigastric area. There is no guarding.      Hernia: No hernia is present.   Musculoskeletal:      Cervical back: Normal range of motion and neck supple.   Lymphadenopathy:      Cervical: No cervical adenopathy.   Skin:     General: Skin is warm and dry.      Coloration: Skin is not pale.      Findings: No rash.   Neurological:      Mental Status: She is alert and oriented to person, place, and time.   Psychiatric:         Speech: Speech normal.         Behavior: Behavior is cooperative.       Consult on 05/07/2021   Component Date Value Ref Range Status   • Iron 05/07/2021 27* 37 - 145 mcg/dL Final   • Iron Saturation 05/07/2021 5* 20 - 50 % Final   • Transferrin 05/07/2021 348  200 - 360 mg/dL Final   • TIBC 05/07/2021 519  298 - 536 mcg/dL Final   • Ferritin 05/07/2021 12.40* 13.00 - 150.00 ng/mL Final   • Folate 05/07/2021 3.58* 4.78 - 24.20 ng/mL Final   • Vitamin B-12 05/07/2021 258  211 - 946 pg/mL Final   • WBC 05/07/2021 10.36  3.40 - 10.80 10*3/mm3 Final   • RBC 05/07/2021 5.11  3.77 - 5.28 10*6/mm3 Final   • Hemoglobin 05/07/2021 14.1  12.0 - 15.9 g/dL Final   • Hematocrit 05/07/2021 44.8  34.0 - 46.6 % Final   • MCV 05/07/2021 87.7  79.0 - 97.0 fL Final   • MCH 05/07/2021 27.6  26.6 - 33.0 pg Final   • MCHC 05/07/2021 31.5  31.5 - 35.7 g/dL Final   • RDW 05/07/2021 14.4  12.3 - 15.4 % Final   • RDW-SD 05/07/2021 46.5  37.0 - 54.0  fl Final   • MPV 05/07/2021 9.0  6.0 - 12.0 fL Final   • Platelets 05/07/2021 356  140 - 450 10*3/mm3 Final   • Neutrophil % 05/07/2021 66.8  42.7 - 76.0 % Final   • Lymphocyte % 05/07/2021 23.5  19.6 - 45.3 % Final   • Monocyte % 05/07/2021 6.9  5.0 - 12.0 % Final   • Eosinophil % 05/07/2021 1.4  0.3 - 6.2 % Final   • Basophil % 05/07/2021 1.0  0.0 - 1.5 % Final   • Immature Grans % 05/07/2021 0.4  0.0 - 0.5 % Final   • Neutrophils, Absolute 05/07/2021 6.93  1.70 - 7.00 10*3/mm3 Final   • Lymphocytes, Absolute 05/07/2021 2.43  0.70 - 3.10 10*3/mm3 Final   • Monocytes, Absolute 05/07/2021 0.71  0.10 - 0.90 10*3/mm3 Final   • Eosinophils, Absolute 05/07/2021 0.15  0.00 - 0.40 10*3/mm3 Final   • Basophils, Absolute 05/07/2021 0.10  0.00 - 0.20 10*3/mm3 Final   • Immature Grans, Absolute 05/07/2021 0.04  0.00 - 0.05 10*3/mm3 Final   • nRBC 05/07/2021 0.0  0.0 - 0.2 /100 WBC Final   • Extra Tube 05/07/2021 Hold for add-ons.   Final    Auto resulted.     Assessment/Plan      1. Gastroesophageal reflux disease, unspecified whether esophagitis present    2. Iron deficiency anemia, unspecified iron deficiency anemia type    3. Personal history of colonic polyps    .       Orders placed during this encounter include:  Orders Placed This Encounter   Procedures   • Follow Anesthesia Guidelines / Standing Orders     Standing Status:   Future   • Obtain Informed Consent     Standing Status:   Future     Order Specific Question:   Informed Consent Given For     Answer:   ESOPHAGOGASTRODUODENOSCOPY and Colonoscopy       ESOPHAGOGASTRODUODENOSCOPY (N/A), COLONOSCOPY (N/A)    Review and/or summary of lab tests, radiology, procedures, medications. Review and summary of old records and obtaining of history. The risks and benefits of my recommendations, as well as other treatment options were discussed with the patient today. Questions were answered.    New Medications Ordered This Visit   Medications   • Sod Picosulfate-Mag Ox-Cit Acd  10-3.5-12 MG-GM -GM/160ML solution     Sig: Take 1 bottle by mouth 1 (One) Time for 1 dose. See admin instructions     Dispense:  160 mL     Refill:  0       Follow-up: Return in about 4 weeks (around 6/8/2021) for Recheck.          This document has been electronically signed by ROSEMARY Braden on May 11, 2021 14:29 CDT           I spent 18 minutes caring for Ga on this date of service. This time includes time spent by me in the following activities:preparing for the visit, reviewing tests, obtaining and/or reviewing a separately obtained history, performing a medically appropriate examination and/or evaluation , counseling and educating the patient/family/caregiver, ordering medications, tests, or procedures, referring and communicating with other health care professionals , documenting information in the medical record and care coordination    Results for orders placed or performed in visit on 05/07/21   [facility]   Result Value Ref Range    Extra Tube Hold for add-ons.    CBC Auto Differential    Specimen: Blood   Result Value Ref Range    WBC 10.36 3.40 - 10.80 10*3/mm3    RBC 5.11 3.77 - 5.28 10*6/mm3    Hemoglobin 14.1 12.0 - 15.9 g/dL    Hematocrit 44.8 34.0 - 46.6 %    MCV 87.7 79.0 - 97.0 fL    MCH 27.6 26.6 - 33.0 pg    MCHC 31.5 31.5 - 35.7 g/dL    RDW 14.4 12.3 - 15.4 %    RDW-SD 46.5 37.0 - 54.0 fl    MPV 9.0 6.0 - 12.0 fL    Platelets 356 140 - 450 10*3/mm3    Neutrophil % 66.8 42.7 - 76.0 %    Lymphocyte % 23.5 19.6 - 45.3 %    Monocyte % 6.9 5.0 - 12.0 %    Eosinophil % 1.4 0.3 - 6.2 %    Basophil % 1.0 0.0 - 1.5 %    Immature Grans % 0.4 0.0 - 0.5 %    Neutrophils, Absolute 6.93 1.70 - 7.00 10*3/mm3    Lymphocytes, Absolute 2.43 0.70 - 3.10 10*3/mm3    Monocytes, Absolute 0.71 0.10 - 0.90 10*3/mm3    Eosinophils, Absolute 0.15 0.00 - 0.40 10*3/mm3    Basophils, Absolute 0.10 0.00 - 0.20 10*3/mm3    Immature Grans, Absolute 0.04 0.00 - 0.05 10*3/mm3    nRBC 0.0 0.0 - 0.2 /100 WBC   Iron  and TIBC    Specimen: Blood   Result Value Ref Range    Iron 27 (L) 37 - 145 mcg/dL    Iron Saturation 5 (L) 20 - 50 %    Transferrin 348 200 - 360 mg/dL    TIBC 519 298 - 536 mcg/dL   Folate    Specimen: Blood   Result Value Ref Range    Folate 3.58 (L) 4.78 - 24.20 ng/mL   Ferritin    Specimen: Blood   Result Value Ref Range    Ferritin 12.40 (L) 13.00 - 150.00 ng/mL   Vitamin B12    Specimen: Blood   Result Value Ref Range    Vitamin B-12 258 211 - 946 pg/mL   Results for orders placed or performed in visit on 04/16/21   CBC Auto Differential    Specimen: Blood   Result Value Ref Range    WBC 11.88 (H) 3.40 - 10.80 10*3/mm3    RBC 4.79 3.77 - 5.28 10*6/mm3    Hemoglobin 13.9 12.0 - 15.9 g/dL    Hematocrit 41.9 34.0 - 46.6 %    MCV 87.5 79.0 - 97.0 fL    MCH 29.0 26.6 - 33.0 pg    MCHC 33.2 31.5 - 35.7 g/dL    RDW 13.7 12.3 - 15.4 %    RDW-SD 43.9 37.0 - 54.0 fl    MPV 9.6 6.0 - 12.0 fL    Platelets 396 140 - 450 10*3/mm3    Neutrophil % 62.9 42.7 - 76.0 %    Lymphocyte % 28.4 19.6 - 45.3 %    Monocyte % 6.2 5.0 - 12.0 %    Eosinophil % 1.2 0.3 - 6.2 %    Basophil % 0.8 0.0 - 1.5 %    Immature Grans % 0.5 0.0 - 0.5 %    Neutrophils, Absolute 7.47 (H) 1.70 - 7.00 10*3/mm3    Lymphocytes, Absolute 3.37 (H) 0.70 - 3.10 10*3/mm3    Monocytes, Absolute 0.74 0.10 - 0.90 10*3/mm3    Eosinophils, Absolute 0.14 0.00 - 0.40 10*3/mm3    Basophils, Absolute 0.10 0.00 - 0.20 10*3/mm3    Immature Grans, Absolute 0.06 (H) 0.00 - 0.05 10*3/mm3    nRBC 0.1 0.0 - 0.2 /100 WBC   Hepatitis C Antibody    Specimen: Blood   Result Value Ref Range    Hepatitis C Ab Non-Reactive Non-Reactive   Iron Profile    Specimen: Blood   Result Value Ref Range    Iron 42 37 - 145 mcg/dL    Iron Saturation 8 (L) 20 - 50 %    Transferrin 338 200 - 360 mg/dL    TIBC 504 298 - 536 mcg/dL   TSH    Specimen: Blood   Result Value Ref Range    TSH 1.310 0.270 - 4.200 uIU/mL   T4, Free    Specimen: Blood   Result Value Ref Range    Free T4 1.34 0.93 - 1.70  ng/dL   Hemoglobin A1c    Specimen: Blood   Result Value Ref Range    Hemoglobin A1C 4.70 (L) 4.80 - 5.60 %   Ferritin    Specimen: Blood   Result Value Ref Range    Ferritin 14.70 13.00 - 150.00 ng/mL   Vitamin B12    Specimen: Blood   Result Value Ref Range    Vitamin B-12 283 211 - 946 pg/mL   Lipid Panel    Specimen: Blood   Result Value Ref Range    Total Cholesterol 217 (H) 0 - 200 mg/dL    Triglycerides 105 0 - 150 mg/dL    HDL Cholesterol 47 40 - 60 mg/dL    LDL Cholesterol  151 (H) 0 - 100 mg/dL    VLDL Cholesterol 19 5 - 40 mg/dL    LDL/HDL Ratio 3.17    Comprehensive Metabolic Panel    Specimen: Blood   Result Value Ref Range    Glucose 83 65 - 99 mg/dL    BUN 13 6 - 20 mg/dL    Creatinine 0.95 0.57 - 1.00 mg/dL    Sodium 139 136 - 145 mmol/L    Potassium 4.2 3.5 - 5.2 mmol/L    Chloride 103 98 - 107 mmol/L    CO2 25.8 22.0 - 29.0 mmol/L    Calcium 9.9 8.6 - 10.5 mg/dL    Total Protein 6.9 6.0 - 8.5 g/dL    Albumin 4.60 3.50 - 5.20 g/dL    ALT (SGPT) 9 1 - 33 U/L    AST (SGOT) 12 1 - 32 U/L    Alkaline Phosphatase 125 (H) 39 - 117 U/L    Total Bilirubin 0.2 0.0 - 1.2 mg/dL    eGFR Non African Amer 60 (L) >60 mL/min/1.73    Globulin 2.3 gm/dL    A/G Ratio 2.0 g/dL    BUN/Creatinine Ratio 13.7 7.0 - 25.0    Anion Gap 10.2 5.0 - 15.0 mmol/L   Results for orders placed or performed in visit on 12/09/20   CBC Auto Differential    Specimen: Blood   Result Value Ref Range    WBC 11.20 (H) 3.40 - 10.80 10*3/mm3    RBC 4.71 3.77 - 5.28 10*6/mm3    Hemoglobin 13.2 12.0 - 15.9 g/dL    Hematocrit 40.8 34.0 - 46.6 %    MCV 86.6 79.0 - 97.0 fL    MCH 28.0 26.6 - 33.0 pg    MCHC 32.4 31.5 - 35.7 g/dL    RDW 13.9 12.3 - 15.4 %    RDW-SD 44.2 37.0 - 54.0 fl    MPV 9.7 6.0 - 12.0 fL    Platelets 386 140 - 450 10*3/mm3    Neutrophil % 60.7 42.7 - 76.0 %    Lymphocyte % 29.1 19.6 - 45.3 %    Monocyte % 7.1 5.0 - 12.0 %    Eosinophil % 1.3 0.3 - 6.2 %    Basophil % 1.2 0.0 - 1.5 %    Immature Grans % 0.6 (H) 0.0 - 0.5 %     Neutrophils, Absolute 6.80 1.70 - 7.00 10*3/mm3    Lymphocytes, Absolute 3.26 (H) 0.70 - 3.10 10*3/mm3    Monocytes, Absolute 0.80 0.10 - 0.90 10*3/mm3    Eosinophils, Absolute 0.14 0.00 - 0.40 10*3/mm3    Basophils, Absolute 0.13 0.00 - 0.20 10*3/mm3    Immature Grans, Absolute 0.07 (H) 0.00 - 0.05 10*3/mm3    nRBC 0.0 0.0 - 0.2 /100 WBC     *Note: Due to a large number of results and/or encounters for the requested time period, some results have not been displayed. A complete set of results can be found in Results Review.

## 2021-05-11 NOTE — PATIENT INSTRUCTIONS
"[facility] (25th ed., pp. 8303-1392). [place], [place]: [employer].\">   Anemia    Anemia is a condition in which there is not enough red blood cells or hemoglobin in the blood. Hemoglobin is a substance in red blood cells that carries oxygen.  When you do not have enough red blood cells or hemoglobin (are anemic), your body cannot get enough oxygen and your organs may not work properly. As a result, you may feel very tired or have other problems.  What are the causes?  Common causes of anemia include:  · Excessive bleeding. Anemia can be caused by excessive bleeding inside or outside the body, including bleeding from the intestines or from heavy menstrual periods in females.  · Poor nutrition.  · Long-lasting (chronic) kidney, thyroid, and liver disease.  · Bone marrow disorders, spleen problems, and blood disorders.  · Cancer and treatments for cancer.  · HIV (human immunodeficiency virus) and AIDS (acquired immunodeficiency syndrome).  · Infections, medicines, and autoimmune disorders that destroy red blood cells.  What are the signs or symptoms?  Symptoms of this condition include:  · Minor weakness.  · Dizziness.  · Headache, or difficulties concentrating and sleeping.  · Heartbeats that feel irregular or faster than normal (palpitations).  · Shortness of breath, especially with exercise.  · Pale skin, lips, and nails, or cold hands and feet.  · Indigestion and nausea.  Symptoms may occur suddenly or develop slowly. If your anemia is mild, you may not have symptoms.  How is this diagnosed?  This condition is diagnosed based on blood tests, your medical history, and a physical exam. In some cases, a test may be needed in which cells are removed from the soft tissue inside of a bone and looked at under a microscope (bone marrow biopsy). Your health care provider may also check your stool (feces) for blood and may do additional testing to look for the cause of your bleeding.  Other tests may " include:  · Imaging tests, such as a CT scan or MRI.  · A procedure to see inside your esophagus and stomach (endoscopy).  · A procedure to see inside your colon and rectum (colonoscopy).  How is this treated?  Treatment for this condition depends on the cause. If you continue to lose a lot of blood, you may need to be treated at a hospital. Treatment may include:  · Taking supplements of iron, vitamin B12, or folic acid.  · Taking a hormone medicine (erythropoietin) that can help to stimulate red blood cell growth.  · Having a blood transfusion. This may be needed if you lose a lot of blood.  · Making changes to your diet.  · Having surgery to remove your spleen.  Follow these instructions at home:  · Take over-the-counter and prescription medicines only as told by your health care provider.  · Take supplements only as told by your health care provider.  · Follow any diet instructions that you were given by your health care provider.  · Keep all follow-up visits as told by your health care provider. This is important.  Contact a health care provider if:  · You develop new bleeding anywhere in the body.  Get help right away if:  · You are very weak.  · You are short of breath.  · You have pain in your abdomen or chest.  · You are dizzy or feel faint.  · You have trouble concentrating.  · You have bloody stools, black stools, or tarry stools.  · You vomit repeatedly or you vomit up blood.  These symptoms may represent a serious problem that is an emergency. Do not wait to see if the symptoms will go away. Get medical help right away. Call your local emergency services (911 in the [place] Do not drive yourself to the hospital.  Summary  · Anemia is a condition in which you do not have enough red blood cells or enough of a substance in your red blood cells that carries oxygen (hemoglobin).  · Symptoms may occur suddenly or develop slowly.  · If your anemia is mild, you may not have symptoms.  · This condition is  diagnosed with blood tests, a medical history, and a physical exam. Other tests may be needed.  · Treatment for this condition depends on the cause of the anemia.  This information is not intended to replace advice given to you by your health care provider. Make sure you discuss any questions you have with your health care provider.  Document Revised: 11/24/2020 Document Reviewed: 11/24/2020  [employer] Patient Education © 2021 [employer]

## 2021-05-13 ENCOUNTER — APPOINTMENT (OUTPATIENT)
Dept: ONCOLOGY | Facility: HOSPITAL | Age: 61
End: 2021-05-13

## 2021-05-14 ENCOUNTER — APPOINTMENT (OUTPATIENT)
Dept: ONCOLOGY | Facility: HOSPITAL | Age: 61
End: 2021-05-14

## 2021-05-17 ENCOUNTER — INFUSION (OUTPATIENT)
Dept: ONCOLOGY | Facility: HOSPITAL | Age: 61
End: 2021-05-17

## 2021-05-17 VITALS — DIASTOLIC BLOOD PRESSURE: 79 MMHG | HEART RATE: 86 BPM | SYSTOLIC BLOOD PRESSURE: 182 MMHG | RESPIRATION RATE: 18 BRPM

## 2021-05-17 DIAGNOSIS — E61.1 IRON DEFICIENCY: ICD-10-CM

## 2021-05-17 DIAGNOSIS — T45.4X5A ADVERSE EFFECT OF IRON, INITIAL ENCOUNTER: Primary | ICD-10-CM

## 2021-05-17 PROCEDURE — 96365 THER/PROPH/DIAG IV INF INIT: CPT | Performed by: INTERNAL MEDICINE

## 2021-05-17 PROCEDURE — 25010000002 IRON SUCROSE PER 1 MG: Performed by: INTERNAL MEDICINE

## 2021-05-17 PROCEDURE — 96375 TX/PRO/DX INJ NEW DRUG ADDON: CPT | Performed by: INTERNAL MEDICINE

## 2021-05-17 PROCEDURE — 25010000002 DIPHENHYDRAMINE PER 50 MG: Performed by: INTERNAL MEDICINE

## 2021-05-17 RX ORDER — ACETAMINOPHEN 325 MG/1
650 TABLET ORAL ONCE
Status: CANCELLED | OUTPATIENT
Start: 2021-05-18

## 2021-05-17 RX ORDER — ACETAMINOPHEN 325 MG/1
650 TABLET ORAL ONCE
Status: COMPLETED | OUTPATIENT
Start: 2021-05-17 | End: 2021-05-17

## 2021-05-17 RX ORDER — DIPHENHYDRAMINE HYDROCHLORIDE 50 MG/ML
25 INJECTION INTRAMUSCULAR; INTRAVENOUS ONCE
Status: CANCELLED | OUTPATIENT
Start: 2021-05-18

## 2021-05-17 RX ORDER — DIPHENHYDRAMINE HYDROCHLORIDE 50 MG/ML
25 INJECTION INTRAMUSCULAR; INTRAVENOUS ONCE
Status: COMPLETED | OUTPATIENT
Start: 2021-05-17 | End: 2021-05-17

## 2021-05-17 RX ORDER — SODIUM CHLORIDE 9 MG/ML
250 INJECTION, SOLUTION INTRAVENOUS ONCE
Status: CANCELLED | OUTPATIENT
Start: 2021-05-18

## 2021-05-17 RX ORDER — SODIUM CHLORIDE 9 MG/ML
250 INJECTION, SOLUTION INTRAVENOUS ONCE
Status: COMPLETED | OUTPATIENT
Start: 2021-05-17 | End: 2021-05-17

## 2021-05-17 RX ORDER — AMITRIPTYLINE HYDROCHLORIDE 75 MG/1
TABLET, FILM COATED ORAL
Qty: 90 TABLET | Refills: 2 | Status: SHIPPED | OUTPATIENT
Start: 2021-05-17 | End: 2022-02-28

## 2021-05-17 RX ADMIN — ACETAMINOPHEN 650 MG: 325 TABLET, FILM COATED ORAL at 15:00

## 2021-05-17 RX ADMIN — DIPHENHYDRAMINE HYDROCHLORIDE 25 MG: 50 INJECTION INTRAMUSCULAR; INTRAVENOUS at 15:03

## 2021-05-17 RX ADMIN — FAMOTIDINE 20 MG: 10 INJECTION INTRAVENOUS at 15:09

## 2021-05-17 RX ADMIN — IRON SUCROSE 200 MG: 20 INJECTION, SOLUTION INTRAVENOUS at 15:42

## 2021-05-17 RX ADMIN — SODIUM CHLORIDE 250 ML: 9 INJECTION, SOLUTION INTRAVENOUS at 15:03

## 2021-05-18 ENCOUNTER — INFUSION (OUTPATIENT)
Dept: ONCOLOGY | Facility: HOSPITAL | Age: 61
End: 2021-05-18

## 2021-05-18 VITALS
RESPIRATION RATE: 18 BRPM | SYSTOLIC BLOOD PRESSURE: 132 MMHG | DIASTOLIC BLOOD PRESSURE: 67 MMHG | HEART RATE: 75 BPM | TEMPERATURE: 96.5 F

## 2021-05-18 DIAGNOSIS — E61.1 IRON DEFICIENCY: ICD-10-CM

## 2021-05-18 DIAGNOSIS — T45.4X5A ADVERSE EFFECT OF IRON, INITIAL ENCOUNTER: Primary | ICD-10-CM

## 2021-05-18 PROCEDURE — 25010000002 DIPHENHYDRAMINE PER 50 MG: Performed by: INTERNAL MEDICINE

## 2021-05-18 PROCEDURE — 96365 THER/PROPH/DIAG IV INF INIT: CPT | Performed by: INTERNAL MEDICINE

## 2021-05-18 PROCEDURE — 25010000002 IRON SUCROSE PER 1 MG: Performed by: INTERNAL MEDICINE

## 2021-05-18 PROCEDURE — 96375 TX/PRO/DX INJ NEW DRUG ADDON: CPT | Performed by: INTERNAL MEDICINE

## 2021-05-18 RX ORDER — SODIUM CHLORIDE 9 MG/ML
250 INJECTION, SOLUTION INTRAVENOUS ONCE
Status: COMPLETED | OUTPATIENT
Start: 2021-05-18 | End: 2021-05-18

## 2021-05-18 RX ORDER — DIPHENHYDRAMINE HYDROCHLORIDE 50 MG/ML
25 INJECTION INTRAMUSCULAR; INTRAVENOUS ONCE
Status: COMPLETED | OUTPATIENT
Start: 2021-05-18 | End: 2021-05-18

## 2021-05-18 RX ORDER — DIPHENHYDRAMINE HYDROCHLORIDE 50 MG/ML
25 INJECTION INTRAMUSCULAR; INTRAVENOUS ONCE
Status: CANCELLED | OUTPATIENT
Start: 2021-05-19

## 2021-05-18 RX ORDER — SODIUM CHLORIDE 9 MG/ML
250 INJECTION, SOLUTION INTRAVENOUS ONCE
Status: CANCELLED | OUTPATIENT
Start: 2021-05-19

## 2021-05-18 RX ORDER — ACETAMINOPHEN 325 MG/1
650 TABLET ORAL ONCE
Status: COMPLETED | OUTPATIENT
Start: 2021-05-18 | End: 2021-05-18

## 2021-05-18 RX ORDER — ACETAMINOPHEN 325 MG/1
650 TABLET ORAL ONCE
Status: CANCELLED | OUTPATIENT
Start: 2021-05-19

## 2021-05-18 RX ADMIN — FAMOTIDINE 20 MG: 10 INJECTION INTRAVENOUS at 14:11

## 2021-05-18 RX ADMIN — DIPHENHYDRAMINE HYDROCHLORIDE 25 MG: 50 INJECTION INTRAMUSCULAR; INTRAVENOUS at 14:02

## 2021-05-18 RX ADMIN — SODIUM CHLORIDE 250 ML: 9 INJECTION, SOLUTION INTRAVENOUS at 14:02

## 2021-05-18 RX ADMIN — ACETAMINOPHEN 650 MG: 325 TABLET, FILM COATED ORAL at 14:02

## 2021-05-18 RX ADMIN — IRON SUCROSE 200 MG: 20 INJECTION, SOLUTION INTRAVENOUS at 14:24

## 2021-05-19 ENCOUNTER — INFUSION (OUTPATIENT)
Dept: ONCOLOGY | Facility: HOSPITAL | Age: 61
End: 2021-05-19

## 2021-05-19 VITALS
HEART RATE: 85 BPM | RESPIRATION RATE: 16 BRPM | SYSTOLIC BLOOD PRESSURE: 150 MMHG | TEMPERATURE: 97.4 F | DIASTOLIC BLOOD PRESSURE: 82 MMHG

## 2021-05-19 DIAGNOSIS — T45.4X5A ADVERSE EFFECT OF IRON, INITIAL ENCOUNTER: Primary | ICD-10-CM

## 2021-05-19 DIAGNOSIS — E61.1 IRON DEFICIENCY: ICD-10-CM

## 2021-05-19 PROCEDURE — 96365 THER/PROPH/DIAG IV INF INIT: CPT | Performed by: INTERNAL MEDICINE

## 2021-05-19 PROCEDURE — 25010000002 DIPHENHYDRAMINE PER 50 MG: Performed by: INTERNAL MEDICINE

## 2021-05-19 PROCEDURE — 25010000002 IRON SUCROSE PER 1 MG: Performed by: INTERNAL MEDICINE

## 2021-05-19 PROCEDURE — 96375 TX/PRO/DX INJ NEW DRUG ADDON: CPT | Performed by: INTERNAL MEDICINE

## 2021-05-19 RX ORDER — SODIUM CHLORIDE 9 MG/ML
250 INJECTION, SOLUTION INTRAVENOUS ONCE
Status: CANCELLED | OUTPATIENT
Start: 2021-05-20

## 2021-05-19 RX ORDER — ACETAMINOPHEN 325 MG/1
650 TABLET ORAL ONCE
Status: CANCELLED | OUTPATIENT
Start: 2021-05-20

## 2021-05-19 RX ORDER — DIPHENHYDRAMINE HYDROCHLORIDE 50 MG/ML
25 INJECTION INTRAMUSCULAR; INTRAVENOUS ONCE
Status: COMPLETED | OUTPATIENT
Start: 2021-05-19 | End: 2021-05-19

## 2021-05-19 RX ORDER — ACETAMINOPHEN 325 MG/1
650 TABLET ORAL ONCE
Status: COMPLETED | OUTPATIENT
Start: 2021-05-19 | End: 2021-05-19

## 2021-05-19 RX ORDER — DIPHENHYDRAMINE HYDROCHLORIDE 50 MG/ML
25 INJECTION INTRAMUSCULAR; INTRAVENOUS ONCE
Status: CANCELLED | OUTPATIENT
Start: 2021-05-20

## 2021-05-19 RX ORDER — SODIUM CHLORIDE 9 MG/ML
250 INJECTION, SOLUTION INTRAVENOUS ONCE
Status: COMPLETED | OUTPATIENT
Start: 2021-05-19 | End: 2021-05-19

## 2021-05-19 RX ADMIN — SODIUM CHLORIDE 250 ML: 9 INJECTION, SOLUTION INTRAVENOUS at 13:59

## 2021-05-19 RX ADMIN — FAMOTIDINE 20 MG: 10 INJECTION INTRAVENOUS at 13:59

## 2021-05-19 RX ADMIN — ACETAMINOPHEN 650 MG: 325 TABLET, FILM COATED ORAL at 13:59

## 2021-05-19 RX ADMIN — IRON SUCROSE 200 MG: 20 INJECTION, SOLUTION INTRAVENOUS at 14:23

## 2021-05-19 RX ADMIN — DIPHENHYDRAMINE HYDROCHLORIDE 25 MG: 50 INJECTION INTRAMUSCULAR; INTRAVENOUS at 14:04

## 2021-05-20 ENCOUNTER — INFUSION (OUTPATIENT)
Dept: ONCOLOGY | Facility: HOSPITAL | Age: 61
End: 2021-05-20

## 2021-05-20 ENCOUNTER — APPOINTMENT (OUTPATIENT)
Dept: ONCOLOGY | Facility: HOSPITAL | Age: 61
End: 2021-05-20

## 2021-05-20 VITALS
RESPIRATION RATE: 18 BRPM | TEMPERATURE: 97.1 F | SYSTOLIC BLOOD PRESSURE: 168 MMHG | HEART RATE: 112 BPM | DIASTOLIC BLOOD PRESSURE: 86 MMHG

## 2021-05-20 DIAGNOSIS — E61.1 IRON DEFICIENCY: ICD-10-CM

## 2021-05-20 DIAGNOSIS — T45.4X5A ADVERSE EFFECT OF IRON, INITIAL ENCOUNTER: Primary | ICD-10-CM

## 2021-05-20 PROCEDURE — 96375 TX/PRO/DX INJ NEW DRUG ADDON: CPT | Performed by: INTERNAL MEDICINE

## 2021-05-20 PROCEDURE — 25010000002 IRON SUCROSE PER 1 MG: Performed by: INTERNAL MEDICINE

## 2021-05-20 PROCEDURE — 96365 THER/PROPH/DIAG IV INF INIT: CPT | Performed by: INTERNAL MEDICINE

## 2021-05-20 PROCEDURE — 25010000002 DIPHENHYDRAMINE PER 50 MG: Performed by: INTERNAL MEDICINE

## 2021-05-20 RX ORDER — ACETAMINOPHEN 325 MG/1
650 TABLET ORAL ONCE
Status: CANCELLED | OUTPATIENT
Start: 2021-05-21

## 2021-05-20 RX ORDER — DIPHENHYDRAMINE HYDROCHLORIDE 50 MG/ML
25 INJECTION INTRAMUSCULAR; INTRAVENOUS ONCE
Status: COMPLETED | OUTPATIENT
Start: 2021-05-20 | End: 2021-05-20

## 2021-05-20 RX ORDER — SODIUM CHLORIDE 9 MG/ML
250 INJECTION, SOLUTION INTRAVENOUS ONCE
Status: CANCELLED | OUTPATIENT
Start: 2021-05-21

## 2021-05-20 RX ORDER — ACETAMINOPHEN 325 MG/1
650 TABLET ORAL ONCE
Status: COMPLETED | OUTPATIENT
Start: 2021-05-20 | End: 2021-05-20

## 2021-05-20 RX ORDER — DIPHENHYDRAMINE HYDROCHLORIDE 50 MG/ML
25 INJECTION INTRAMUSCULAR; INTRAVENOUS ONCE
Status: CANCELLED | OUTPATIENT
Start: 2021-05-21

## 2021-05-20 RX ORDER — SODIUM CHLORIDE 9 MG/ML
250 INJECTION, SOLUTION INTRAVENOUS ONCE
Status: COMPLETED | OUTPATIENT
Start: 2021-05-20 | End: 2021-05-20

## 2021-05-20 RX ADMIN — DIPHENHYDRAMINE HYDROCHLORIDE 25 MG: 50 INJECTION INTRAMUSCULAR; INTRAVENOUS at 14:07

## 2021-05-20 RX ADMIN — IRON SUCROSE 200 MG: 20 INJECTION, SOLUTION INTRAVENOUS at 14:32

## 2021-05-20 RX ADMIN — ACETAMINOPHEN 650 MG: 325 TABLET, FILM COATED ORAL at 14:07

## 2021-05-20 RX ADMIN — FAMOTIDINE 20 MG: 10 INJECTION INTRAVENOUS at 14:10

## 2021-05-20 RX ADMIN — SODIUM CHLORIDE 250 ML: 9 INJECTION, SOLUTION INTRAVENOUS at 14:07

## 2021-05-21 ENCOUNTER — APPOINTMENT (OUTPATIENT)
Dept: ONCOLOGY | Facility: HOSPITAL | Age: 61
End: 2021-05-21

## 2021-05-21 ENCOUNTER — INFUSION (OUTPATIENT)
Dept: ONCOLOGY | Facility: HOSPITAL | Age: 61
End: 2021-05-21

## 2021-05-21 VITALS
RESPIRATION RATE: 16 BRPM | HEART RATE: 82 BPM | DIASTOLIC BLOOD PRESSURE: 84 MMHG | SYSTOLIC BLOOD PRESSURE: 146 MMHG | TEMPERATURE: 97.7 F

## 2021-05-21 DIAGNOSIS — E61.1 IRON DEFICIENCY: ICD-10-CM

## 2021-05-21 DIAGNOSIS — T45.4X5A ADVERSE EFFECT OF IRON, INITIAL ENCOUNTER: Primary | ICD-10-CM

## 2021-05-21 PROCEDURE — 96365 THER/PROPH/DIAG IV INF INIT: CPT | Performed by: INTERNAL MEDICINE

## 2021-05-21 PROCEDURE — 96375 TX/PRO/DX INJ NEW DRUG ADDON: CPT | Performed by: INTERNAL MEDICINE

## 2021-05-21 PROCEDURE — 25010000002 IRON SUCROSE PER 1 MG: Performed by: INTERNAL MEDICINE

## 2021-05-21 PROCEDURE — 25010000002 DIPHENHYDRAMINE PER 50 MG: Performed by: INTERNAL MEDICINE

## 2021-05-21 RX ORDER — DIPHENHYDRAMINE HYDROCHLORIDE 50 MG/ML
25 INJECTION INTRAMUSCULAR; INTRAVENOUS ONCE
Status: COMPLETED | OUTPATIENT
Start: 2021-05-21 | End: 2021-05-21

## 2021-05-21 RX ORDER — DIPHENHYDRAMINE HYDROCHLORIDE 50 MG/ML
25 INJECTION INTRAMUSCULAR; INTRAVENOUS ONCE
Status: CANCELLED | OUTPATIENT
Start: 2021-05-21

## 2021-05-21 RX ORDER — ACETAMINOPHEN 325 MG/1
650 TABLET ORAL ONCE
Status: COMPLETED | OUTPATIENT
Start: 2021-05-21 | End: 2021-05-21

## 2021-05-21 RX ORDER — SODIUM CHLORIDE 9 MG/ML
250 INJECTION, SOLUTION INTRAVENOUS ONCE
Status: COMPLETED | OUTPATIENT
Start: 2021-05-21 | End: 2021-05-21

## 2021-05-21 RX ORDER — SODIUM CHLORIDE 9 MG/ML
250 INJECTION, SOLUTION INTRAVENOUS ONCE
Status: CANCELLED | OUTPATIENT
Start: 2021-05-21

## 2021-05-21 RX ORDER — ACETAMINOPHEN 325 MG/1
650 TABLET ORAL ONCE
Status: CANCELLED | OUTPATIENT
Start: 2021-05-21

## 2021-05-21 RX ADMIN — DIPHENHYDRAMINE HYDROCHLORIDE 25 MG: 50 INJECTION INTRAMUSCULAR; INTRAVENOUS at 14:52

## 2021-05-21 RX ADMIN — SODIUM CHLORIDE 250 ML: 9 INJECTION, SOLUTION INTRAVENOUS at 14:52

## 2021-05-21 RX ADMIN — FAMOTIDINE 20 MG: 10 INJECTION INTRAVENOUS at 14:52

## 2021-05-21 RX ADMIN — IRON SUCROSE 200 MG: 20 INJECTION, SOLUTION INTRAVENOUS at 15:14

## 2021-05-21 RX ADMIN — ACETAMINOPHEN 650 MG: 325 TABLET, FILM COATED ORAL at 14:52

## 2021-06-01 RX ORDER — TOPIRAMATE 50 MG/1
TABLET, FILM COATED ORAL
Qty: 90 TABLET | Refills: 2 | Status: SHIPPED | OUTPATIENT
Start: 2021-06-01 | End: 2022-02-28

## 2021-06-01 RX ORDER — FAMOTIDINE 40 MG/1
TABLET, FILM COATED ORAL
Qty: 90 TABLET | Refills: 3 | Status: SHIPPED | OUTPATIENT
Start: 2021-06-01 | End: 2022-05-16

## 2021-06-12 ENCOUNTER — LAB (OUTPATIENT)
Dept: LAB | Facility: HOSPITAL | Age: 61
End: 2021-06-12

## 2021-06-12 DIAGNOSIS — Z01.818 PREOP TESTING: Primary | ICD-10-CM

## 2021-06-12 LAB — SARS-COV-2 N GENE RESP QL NAA+PROBE: NOT DETECTED

## 2021-06-12 PROCEDURE — 87635 SARS-COV-2 COVID-19 AMP PRB: CPT

## 2021-06-12 PROCEDURE — C9803 HOPD COVID-19 SPEC COLLECT: HCPCS

## 2021-06-15 ENCOUNTER — ANESTHESIA (OUTPATIENT)
Dept: GASTROENTEROLOGY | Facility: HOSPITAL | Age: 61
End: 2021-06-15

## 2021-06-15 ENCOUNTER — HOSPITAL ENCOUNTER (OUTPATIENT)
Facility: HOSPITAL | Age: 61
Setting detail: HOSPITAL OUTPATIENT SURGERY
Discharge: HOME | End: 2021-06-15
Attending: INTERNAL MEDICINE | Admitting: INTERNAL MEDICINE

## 2021-06-15 ENCOUNTER — ANESTHESIA EVENT (OUTPATIENT)
Dept: GASTROENTEROLOGY | Facility: HOSPITAL | Age: 61
End: 2021-06-15

## 2021-06-15 VITALS
HEART RATE: 74 BPM | TEMPERATURE: 97.9 F | BODY MASS INDEX: 27.04 KG/M2 | RESPIRATION RATE: 18 BRPM | DIASTOLIC BLOOD PRESSURE: 69 MMHG | HEIGHT: 63 IN | OXYGEN SATURATION: 96 % | SYSTOLIC BLOOD PRESSURE: 117 MMHG | WEIGHT: 152.6 LBS

## 2021-06-15 DIAGNOSIS — D50.9 IRON DEFICIENCY ANEMIA, UNSPECIFIED IRON DEFICIENCY ANEMIA TYPE: ICD-10-CM

## 2021-06-15 DIAGNOSIS — K21.9 GASTROESOPHAGEAL REFLUX DISEASE, UNSPECIFIED WHETHER ESOPHAGITIS PRESENT: ICD-10-CM

## 2021-06-15 PROCEDURE — 25010000002 PROPOFOL 10 MG/ML EMULSION: Performed by: NURSE ANESTHETIST, CERTIFIED REGISTERED

## 2021-06-15 PROCEDURE — 43239 EGD BIOPSY SINGLE/MULTIPLE: CPT | Performed by: INTERNAL MEDICINE

## 2021-06-15 PROCEDURE — 45378 DIAGNOSTIC COLONOSCOPY: CPT | Performed by: INTERNAL MEDICINE

## 2021-06-15 RX ORDER — DEXTROSE AND SODIUM CHLORIDE 5; .45 G/100ML; G/100ML
30 INJECTION, SOLUTION INTRAVENOUS CONTINUOUS PRN
Status: DISCONTINUED | OUTPATIENT
Start: 2021-06-15 | End: 2021-06-15 | Stop reason: HOSPADM

## 2021-06-15 RX ORDER — LIDOCAINE HYDROCHLORIDE 20 MG/ML
INJECTION, SOLUTION INTRAVENOUS AS NEEDED
Status: DISCONTINUED | OUTPATIENT
Start: 2021-06-15 | End: 2021-06-15 | Stop reason: SURG

## 2021-06-15 RX ORDER — PROPOFOL 10 MG/ML
VIAL (ML) INTRAVENOUS AS NEEDED
Status: DISCONTINUED | OUTPATIENT
Start: 2021-06-15 | End: 2021-06-15 | Stop reason: SURG

## 2021-06-15 RX ADMIN — PROPOFOL 20 MG: 10 INJECTION, EMULSION INTRAVENOUS at 14:17

## 2021-06-15 RX ADMIN — DEXTROSE AND SODIUM CHLORIDE 30 ML/HR: 5; 450 INJECTION, SOLUTION INTRAVENOUS at 13:56

## 2021-06-15 RX ADMIN — PROPOFOL 20 MG: 10 INJECTION, EMULSION INTRAVENOUS at 14:21

## 2021-06-15 RX ADMIN — PROPOFOL 30 MG: 10 INJECTION, EMULSION INTRAVENOUS at 14:18

## 2021-06-15 RX ADMIN — PROPOFOL 20 MG: 10 INJECTION, EMULSION INTRAVENOUS at 14:24

## 2021-06-15 RX ADMIN — PROPOFOL 100 MG: 10 INJECTION, EMULSION INTRAVENOUS at 14:14

## 2021-06-15 RX ADMIN — LIDOCAINE HYDROCHLORIDE 80 MG: 20 INJECTION, SOLUTION INTRAVENOUS at 14:14

## 2021-06-15 RX ADMIN — PROPOFOL 30 MG: 10 INJECTION, EMULSION INTRAVENOUS at 14:26

## 2021-06-15 NOTE — ANESTHESIA POSTPROCEDURE EVALUATION
Patient: Ga Mendoza    Procedure Summary     Date: 06/15/21 Room / Location: [facility] ENDOSCOPY 1 / [facility] ENDOSCOPY    Anesthesia Start: 1410 Anesthesia Stop: 1433    Procedures:       ESOPHAGOGASTRODUODENOSCOPY (N/A )      COLONOSCOPY (N/A ) Diagnosis:       Gastroesophageal reflux disease, unspecified whether esophagitis present      Iron deficiency anemia, unspecified iron deficiency anemia type      (Gastroesophageal reflux disease, unspecified whether esophagitis present [K21.9])      (Iron deficiency anemia, unspecified iron deficiency anemia type [D50.9])    Surgeons: Erickson Woodward MD Provider: Autumn Whyte CRNA    Anesthesia Type: MAC ASA Status: 3          Anesthesia Type: MAC    Vitals  No vitals data found for the desired time range.          Post Anesthesia Care and Evaluation    Patient location during evaluation: bedside  Patient participation: waiting for patient participation  Level of consciousness: sleepy but conscious  Pain score: 0  Pain management: adequate  Airway patency: patent  Anesthetic complications: No anesthetic complications  PONV Status: none  Cardiovascular status: acceptable  Respiratory status: acceptable  Hydration status: acceptable

## 2021-06-15 NOTE — ANESTHESIA PREPROCEDURE EVALUATION
Anesthesia Evaluation     Patient summary reviewed and Nursing notes reviewed   NPO Solid Status: > 8 hours  NPO Liquid Status: > 4 hours           Airway   Mallampati: III  TM distance: >3 FB  No difficulty expected  Dental    (+) upper dentures and implants    Pulmonary - normal exam   (+) a smoker Current, COPD, asthma,shortness of breath,   Cardiovascular - normal exam  Exercise tolerance: poor (<4 METS)    (+) hypertension, ROCK, hyperlipidemia,       Neuro/Psych  (+) headaches, psychiatric history Depression and Anxiety,     GI/Hepatic/Renal/Endo    (+) obesity,  GERD poorly controlled,      Musculoskeletal     (+) arthralgias,   Abdominal   (+) obese,    Substance History   (+) alcohol use,   (-) drug use     OB/GYN negative ob/gyn ROS         Other   arthritis,                    Anesthesia Plan    ASA 3     MAC     intravenous induction     Anesthetic plan, all risks, benefits, and alternatives have been provided, discussed and informed consent has been obtained with: patient.

## 2021-06-17 LAB
LAB AP CASE REPORT: NORMAL
PATH REPORT.FINAL DX SPEC: NORMAL

## 2021-06-23 ENCOUNTER — OFFICE VISIT (OUTPATIENT)
Dept: GASTROENTEROLOGY | Facility: CLINIC | Age: 61
End: 2021-06-23

## 2021-06-23 VITALS
WEIGHT: 154.2 LBS | BODY MASS INDEX: 27.32 KG/M2 | HEIGHT: 63 IN | DIASTOLIC BLOOD PRESSURE: 84 MMHG | HEART RATE: 98 BPM | SYSTOLIC BLOOD PRESSURE: 154 MMHG

## 2021-06-23 DIAGNOSIS — K59.04 CHRONIC IDIOPATHIC CONSTIPATION: ICD-10-CM

## 2021-06-23 DIAGNOSIS — K21.9 GASTROESOPHAGEAL REFLUX DISEASE WITHOUT ESOPHAGITIS: Primary | ICD-10-CM

## 2021-06-23 DIAGNOSIS — K29.30 CHRONIC SUPERFICIAL GASTRITIS WITHOUT BLEEDING: ICD-10-CM

## 2021-06-23 PROCEDURE — 99214 OFFICE O/P EST MOD 30 MIN: CPT | Performed by: NURSE PRACTITIONER

## 2021-06-23 NOTE — PROGRESS NOTES
Chief Complaint   Patient presents with   • Anemia   • Heartburn       Subjective    Ga Mendoza is a 60 y.o. female. she is here today for follow-up.    History of Present Illness  60-year-old female presents for recheck after EGD and colonoscopy done due to anemia and history of colonic polyps.  She had Nissen fundoplication around 2005 and reflux have been well controlled with no medication until recently she is now taking Pepcid and denies any significant symptoms.  EGD noted gastritis in the stomach normal duodenum and normal esophagus.  Small bowel biopsy noted unremarkable small intestine mucosa with preserved villous architecture.  Negative for celiac type changes.  Antrum biopsy noted antral type mucosa with reactive gastropathy.  Negative for H. Pylori.  Colonoscopy noted adequate prep normal perianal digital rectal exam hemorrhoids were found otherwise normal exam repeat recommended in 5 years for surveillance.  Reports she has had continual issues with constipation bowel movements are only 2-3 times per week she has to strain and has been taking Movantik but does not help.      The following portions of the patient's history were reviewed and updated as appropriate:   Past Medical History:   Diagnosis Date   • Anemia    • Anxiety     Anxiety state      • Bipolar affective disorder, current episode depressed (CMS/HCC)    • Bipolar disorder (CMS/HCC)    • Breast cyst    • Drug therapy     Long-term drug therapy      • Essential hypertension    • Headache    • History of transfusion    • Hyperlipidemia    • Influenza    • Liver function test abnormality     Abnormal liver test   • Neuropathic pain    • Obesity    • Pain of breast     No evidence of cancer      • Urinary tract infectious disease      Past Surgical History:   Procedure Laterality Date   • APPENDECTOMY      Appendectomy (1)      • BREAST BIOPSY     • COLONOSCOPY N/A 2/14/2019    Procedure: COLONOSCOPY;  Surgeon: Jaiyeola, Aderemi,  MD;  Location: [facility] ENDOSCOPY;  Service: General   • COLONOSCOPY N/A 6/15/2021    Procedure: COLONOSCOPY;  Surgeon: Erickson Woodward MD;  Location: [facility] ENDOSCOPY;  Service: Gastroenterology;  Laterality: N/A;   • DIAGNOSTIC LAPAROSCOPY  2003    Laparosc diagnostic (1)      • ENDOSCOPY N/A 6/15/2021    Procedure: ESOPHAGOGASTRODUODENOSCOPY;  Surgeon: Erickson Woodward MD;  Location: [facility] ENDOSCOPY;  Service: Gastroenterology;  Laterality: N/A;   • ENDOSCOPY W/ PEG TUBE PLACEMENT  2003    EGD w/ tube 12986 (1)      • LAPAROSCOPY ESOPHAGOGASTRIC FUNDOPLASTY HYBRID  02/10/2005    Fundoplasty, laparoscopic (1)      • PAP SMEAR  2009    PAP SMEAR (1)      • TOTAL ABDOMINAL HYSTERECTOMY WITH SALPINGO OOPHORECTOMY  2003    JOSE/BSO (1)        Family History   Problem Relation Age of Onset   • Depression Other    • Diabetes Other    • Heart disease Other    • Hypertension Other    • Stroke Other    • Lung disease Other    • Other Other         Colon problems   • COPD Mother    • Heart disease Father    • Lung disease Father    • Hypertension Sister    • Hypertension Brother    • Fibromyalgia Daughter    • Anxiety disorder Son    • Diabetes Brother    • No Known Problems Daughter      OB History        3    Para   3    Term   3            AB        Living           SAB        TAB        Ectopic        Molar        Multiple        Live Births                  Prior to Admission medications    Medication Sig Start Date End Date Taking? Authorizing Provider   albuterol (ACCUNEB) 1.25 MG/3ML nebulizer solution Take 3 mL by nebulization Every 6 (Six) Hours As Needed for Wheezing or Shortness of Air. 20  Yes Nikunj Lipscomb APRN   amitriptyline (ELAVIL) 75 MG tablet TAKE 1 TABLET EVERY NIGHT 21  Yes Nikunj Lipscomb APRN   ascorbic acid (VITAMIN C) 250 MG tablet Take 500 mg by mouth Daily.   Yes Provider, MD Gilma   aspirin 81 MG tablet Take 81 mg by mouth.   Yes  Gilma Cruz MD   azelastine (ASTELIN) 0.1 % nasal spray 2 sprays into the nostril(s) as directed by provider As Needed for Rhinitis. Use in each nostril as directed   Yes Gilma Cruz MD   B Complex Vitamins (VITAMIN B COMPLEX PO) Take 1 tablet by mouth Daily.   Yes Gilma Cruz MD   Biotin 1000 MCG tablet Take 1,000 mcg by mouth.   Yes Gilma Cruz MD   budesonide-formoterol (Symbicort) 160-4.5 MCG/ACT inhaler Inhale 2 puffs 2 (Two) Times a Day. 4/16/21  Yes Nikunj Lipscomb APRN   Buprenorphine 20 MCG/HR patch weekly Place 1 patch on the skin 1 (One) Time Per Week. 8/23/17  Yes Carlos Mchugh MD   busPIRone (BUSPAR) 7.5 MG tablet TAKE 1 TABLET 3 TIMES A DAY 2/17/21  Yes Nikunj Lipscomb APRN   Calcium Citrate-Vitamin D (CALCIUM CITRATE + D PO) Take 1 tablet by mouth Daily.   Yes Gilma Cruz MD   Cholecalciferol (VITAMIN D3) 1000 units capsule  4/10/19  Yes Gilma Cruz MD   cyclobenzaprine (FLEXERIL) 10 MG tablet TAKE 1 TABLET 3 TIMES A DAYAS NEEDED FOR MUSCLE       SPASMS 1/22/21  Yes Nikunj Lipscomb APRN   famotidine (PEPCID) 40 MG tablet TAKE 1 TABLET DAILY 6/1/21  Yes Nikunj Lipscomb APRN   folic acid (FOLVITE) 1 MG tablet Take 1 tablet by mouth Daily. 5/8/21  Yes Po Park MD   hydrOXYzine pamoate (VISTARIL) 25 MG capsule TAKE 1 CAPSULE EVERY NIGHT AT BEDTIME 5/3/21  Yes Nikunj Lipscomb APRN   lamoTRIgine (LaMICtal) 200 MG tablet TAKE 1 TABLET DAILY. 3/15/21  Yes Nikunj Lipscomb APRN   lisinopril (PRINIVIL,ZESTRIL) 40 MG tablet TAKE 1 TABLET DAILY 5/3/21  Yes Nikunj Lipscomb APRN   loratadine (CLARITIN) 10 MG tablet Take 10 mg by mouth.   Yes Gilma Cruz MD   metoprolol tartrate (LOPRESSOR) 25 MG tablet TAKE 1 TABLET TWICE A DAY 5/3/21  Yes Nikunj Lipscomb APRN   Milk Thistle 1000 MG capsule Take  by mouth.   Yes Provider, MD Gilma   Multiple Vitamin (MULTIVITAMIN+ PO) Take 1 tablet by mouth Daily.   Yes  Gilma Cruz MD   Naloxegol Oxalate (MOVANTIK) 25 MG tablet Take 25 mg by mouth. 8/20/18  Yes Gilma Cruz MD   Niacin, Antihyperlipidemic, 500 MG tablet Take  by mouth.   Yes Gilma Cruz MD   pregabalin (LYRICA) 200 MG capsule Take 200 mg by mouth 2 (Two) Times a Day. 3/25/19  Yes Gilma Cruz MD   rOPINIRole (REQUIP) 0.25 MG tablet TAKE 1 TABLET NIGHTLY 1    HOUR BEFORE BEDTIME AS     NEEDED FOR RESTLESS LEG    SYMPTOMS 3/25/21  Yes Nikunj Lipscomb APRN   rosuvastatin (CRESTOR) 20 MG tablet TAKE 1 TABLET DAILY 5/3/21  Yes Nikunj Lipscomb APRN   topiramate (TOPAMAX) 100 MG tablet TAKE 1 TABLET EVERY EVENING 2/17/21  Yes Nikunj Lipscomb APRN   topiramate (TOPAMAX) 50 MG tablet TAKE 1 TABLET DAILY 6/1/21  Yes Nikunj Lipscomb APRN   Trintellix 20 MG tablet TAKE 1 TABLET DAILY 5/3/21  Yes Nikunj Lipscomb APRN   vitamin B-12 (CYANOCOBALAMIN) 1000 MCG tablet  4/10/19  Yes Gilma Cruz MD   vitamin E 400 UNIT capsule Take 400 Units by mouth Daily.   Yes Gilma Cruz MD     No Known Allergies  Social History     Socioeconomic History   • Marital status:      Spouse name: Not on file   • Number of children: Not on file   • Years of education: Not on file   • Highest education level: Not on file   Tobacco Use   • Smoking status: Current Every Day Smoker     Packs/day: 1.00     Years: 25.00     Pack years: 25.00     Types: Cigarettes   • Smokeless tobacco: Never Used   Vaping Use   • Vaping Use: Never used   Substance and Sexual Activity   • Alcohol use: No   • Drug use: No   • Sexual activity: Defer       Review of Systems  Review of Systems   Constitutional: Negative for activity change, appetite change, chills, diaphoresis, fatigue, fever and unexpected weight change.   HENT: Negative for sore throat and trouble swallowing.    Respiratory: Negative for shortness of breath.    Gastrointestinal: Positive for constipation. Negative for abdominal  "distention, abdominal pain, anal bleeding, blood in stool, diarrhea, nausea, rectal pain and vomiting.   Musculoskeletal: Negative for arthralgias.   Skin: Negative for pallor.   Neurological: Negative for light-headedness.        /84 (BP Location: Left arm)   Pulse 98   Ht 160 cm (63\")   Wt 69.9 kg (154 lb 3.2 oz)   BMI 27.32 kg/m²     Objective    Physical Exam  Constitutional:       General: She is not in acute distress.     Appearance: Normal appearance. She is well-developed.   HENT:      Head: Normocephalic and atraumatic.   Neck:      Thyroid: No thyromegaly.   Cardiovascular:      Rate and Rhythm: Normal rate.   Pulmonary:      Effort: Pulmonary effort is normal.   Abdominal:      General: Bowel sounds are normal. There is no distension.      Palpations: Abdomen is soft. Abdomen is not rigid.      Tenderness: There is generalized abdominal tenderness. There is no guarding.      Hernia: No hernia is present.   Musculoskeletal:      Cervical back: Normal range of motion and neck supple.   Skin:     General: Skin is warm and dry.   Neurological:      Mental Status: She is alert and oriented to person, place, and time.   Psychiatric:         Speech: Speech normal.         Behavior: Behavior is cooperative.       Admission on 06/15/2021, Discharged on 06/15/2021   Component Date Value Ref Range Status   • Case Report 06/15/2021    Final                    Value:Surgical Pathology Report                         Case: [id number]                                  Authorizing Provider:  Erickson Woodward MD        Collected:           06/15/2021 02:20 PM          Ordering Location:     [facility]             Received:            06/16/2021 07:37 AM                                 [facility]                                                     Pathologist:           Eliu Caba MD                                                          Specimens:   1) - Small Intestine, Duodenum, small " bowel   cold bx by LUIS                                         2) - Gastric, Antrum, antrum   cold bx by LUIS                                              • Final Diagnosis 06/15/2021    Final                    Value:This result contains rich text formatting which cannot be displayed here.     Assessment/Plan      1. Gastroesophageal reflux disease without esophagitis    2. Chronic superficial gastritis without bleeding    3. Chronic idiopathic constipation    .   Continue Pepcid for esophagitis and gastritis.  Discontinue Movantik start patient on Linzess daily recommend increase water fiber and activity as tolerated. Follow-up in 1 month for recheck return office sooner if needed    Orders placed during this encounter include:  No orders of the defined types were placed in this encounter.      * Surgery not found *    Review and/or summary of lab tests, radiology, procedures, medications. Review and summary of old records and obtaining of history. The risks and benefits of my recommendations, as well as other treatment options were discussed with the patient today. Questions were answered.    New Medications Ordered This Visit   Medications   • linaclotide (Linzess) 145 MCG capsule capsule     Sig: Take 1 capsule by mouth Every Morning Before Breakfast.     Dispense:  90 capsule     Refill:  0       Follow-up: Return in about 4 weeks (around 7/21/2021).          This document has been electronically signed by ROSEMARY Braden on June 24, 2021 14:12 CDT           I spent 36 minutes caring for Ga on this date of service. This time includes time spent by me in the following activities:preparing for the visit, reviewing tests, obtaining and/or reviewing a separately obtained history, performing a medically appropriate examination and/or evaluation , counseling and educating the patient/family/caregiver, ordering medications, tests, or procedures, referring and communicating with other health care professionals ,  documenting information in the medical record and care coordination    Results for orders placed or performed during the hospital encounter of 06/15/21   Tissue Pathology Exam    Specimen: A: Small Intestine, Duodenum; Tissue    B: Gastric, Antrum; Tissue   Result Value Ref Range    Case Report       Surgical Pathology Report                         Case: [id number]                                  Authorizing Provider:  Erickson Woodward MD        Collected:           06/15/2021 02:20 PM          Ordering Location:     [facility]             Received:            06/16/2021 07:37 AM                                 [facility]                                                     Pathologist:           Eliu Caba MD                                                          Specimens:   1) - Small Intestine, Duodenum, small bowel   cold bx by CJ                                         2) - Gastric, Antrum, antrum   cold bx by CJ                                               Final Diagnosis       SEE SCANNED REPORT       Results for orders placed or performed in visit on 06/12/21   COVID-19, [facility] IN-HOUSE, NP SWAB IN TRANSPORT MEDIA 8-10 HR TAT - Swab, Nasopharynx    Specimen: Nasopharynx; Swab   Result Value Ref Range    COVID19 Not Detected Not Detected - Ref. Range   Results for orders placed or performed in visit on 05/07/21   Gold Top - SST   Result Value Ref Range    Extra Tube Hold for add-ons.    CBC Auto Differential    Specimen: Blood   Result Value Ref Range    WBC 10.36 3.40 - 10.80 10*3/mm3    RBC 5.11 3.77 - 5.28 10*6/mm3    Hemoglobin 14.1 12.0 - 15.9 g/dL    Hematocrit 44.8 34.0 - 46.6 %    MCV 87.7 79.0 - 97.0 fL    MCH 27.6 26.6 - 33.0 pg    MCHC 31.5 31.5 - 35.7 g/dL    RDW 14.4 12.3 - 15.4 %    RDW-SD 46.5 37.0 - 54.0 fl    MPV 9.0 6.0 - 12.0 fL    Platelets 356 140 - 450 10*3/mm3    Neutrophil % 66.8 42.7 - 76.0 %    Lymphocyte % 23.5 19.6 - 45.3 %    Monocyte % 6.9 5.0 -  12.0 %    Eosinophil % 1.4 0.3 - 6.2 %    Basophil % 1.0 0.0 - 1.5 %    Immature Grans % 0.4 0.0 - 0.5 %    Neutrophils, Absolute 6.93 1.70 - 7.00 10*3/mm3    Lymphocytes, Absolute 2.43 0.70 - 3.10 10*3/mm3    Monocytes, Absolute 0.71 0.10 - 0.90 10*3/mm3    Eosinophils, Absolute 0.15 0.00 - 0.40 10*3/mm3    Basophils, Absolute 0.10 0.00 - 0.20 10*3/mm3    Immature Grans, Absolute 0.04 0.00 - 0.05 10*3/mm3    nRBC 0.0 0.0 - 0.2 /100 WBC   Iron and TIBC    Specimen: Blood   Result Value Ref Range    Iron 27 (L) 37 - 145 mcg/dL    Iron Saturation 5 (L) 20 - 50 %    Transferrin 348 200 - 360 mg/dL    TIBC 519 298 - 536 mcg/dL   Folate    Specimen: Blood   Result Value Ref Range    Folate 3.58 (L) 4.78 - 24.20 ng/mL   Ferritin    Specimen: Blood   Result Value Ref Range    Ferritin 12.40 (L) 13.00 - 150.00 ng/mL   Vitamin B12    Specimen: Blood   Result Value Ref Range    Vitamin B-12 258 211 - 946 pg/mL   Results for orders placed or performed in visit on 04/16/21   CBC Auto Differential    Specimen: Blood   Result Value Ref Range    WBC 11.88 (H) 3.40 - 10.80 10*3/mm3    RBC 4.79 3.77 - 5.28 10*6/mm3    Hemoglobin 13.9 12.0 - 15.9 g/dL    Hematocrit 41.9 34.0 - 46.6 %    MCV 87.5 79.0 - 97.0 fL    MCH 29.0 26.6 - 33.0 pg    MCHC 33.2 31.5 - 35.7 g/dL    RDW 13.7 12.3 - 15.4 %    RDW-SD 43.9 37.0 - 54.0 fl    MPV 9.6 6.0 - 12.0 fL    Platelets 396 140 - 450 10*3/mm3    Neutrophil % 62.9 42.7 - 76.0 %    Lymphocyte % 28.4 19.6 - 45.3 %    Monocyte % 6.2 5.0 - 12.0 %    Eosinophil % 1.2 0.3 - 6.2 %    Basophil % 0.8 0.0 - 1.5 %    Immature Grans % 0.5 0.0 - 0.5 %    Neutrophils, Absolute 7.47 (H) 1.70 - 7.00 10*3/mm3    Lymphocytes, Absolute 3.37 (H) 0.70 - 3.10 10*3/mm3    Monocytes, Absolute 0.74 0.10 - 0.90 10*3/mm3    Eosinophils, Absolute 0.14 0.00 - 0.40 10*3/mm3    Basophils, Absolute 0.10 0.00 - 0.20 10*3/mm3    Immature Grans, Absolute 0.06 (H) 0.00 - 0.05 10*3/mm3    nRBC 0.1 0.0 - 0.2 /100 WBC   Hepatitis C  Antibody    Specimen: Blood   Result Value Ref Range    Hepatitis C Ab Non-Reactive Non-Reactive   Iron Profile    Specimen: Blood   Result Value Ref Range    Iron 42 37 - 145 mcg/dL    Iron Saturation 8 (L) 20 - 50 %    Transferrin 338 200 - 360 mg/dL    TIBC 504 298 - 536 mcg/dL   TSH    Specimen: Blood   Result Value Ref Range    TSH 1.310 0.270 - 4.200 uIU/mL   T4, Free    Specimen: Blood   Result Value Ref Range    Free T4 1.34 0.93 - 1.70 ng/dL   Hemoglobin A1c    Specimen: Blood   Result Value Ref Range    Hemoglobin A1C 4.70 (L) 4.80 - 5.60 %   Ferritin    Specimen: Blood   Result Value Ref Range    Ferritin 14.70 13.00 - 150.00 ng/mL   Vitamin B12    Specimen: Blood   Result Value Ref Range    Vitamin B-12 283 211 - 946 pg/mL   Lipid Panel    Specimen: Blood   Result Value Ref Range    Total Cholesterol 217 (H) 0 - 200 mg/dL    Triglycerides 105 0 - 150 mg/dL    HDL Cholesterol 47 40 - 60 mg/dL    LDL Cholesterol  151 (H) 0 - 100 mg/dL    VLDL Cholesterol 19 5 - 40 mg/dL    LDL/HDL Ratio 3.17    Comprehensive Metabolic Panel    Specimen: Blood   Result Value Ref Range    Glucose 83 65 - 99 mg/dL    BUN 13 6 - 20 mg/dL    Creatinine 0.95 0.57 - 1.00 mg/dL    Sodium 139 136 - 145 mmol/L    Potassium 4.2 3.5 - 5.2 mmol/L    Chloride 103 98 - 107 mmol/L    CO2 25.8 22.0 - 29.0 mmol/L    Calcium 9.9 8.6 - 10.5 mg/dL    Total Protein 6.9 6.0 - 8.5 g/dL    Albumin 4.60 3.50 - 5.20 g/dL    ALT (SGPT) 9 1 - 33 U/L    AST (SGOT) 12 1 - 32 U/L    Alkaline Phosphatase 125 (H) 39 - 117 U/L    Total Bilirubin 0.2 0.0 - 1.2 mg/dL    eGFR Non African Amer 60 (L) >60 mL/min/1.73    Globulin 2.3 gm/dL    A/G Ratio 2.0 g/dL    BUN/Creatinine Ratio 13.7 7.0 - 25.0    Anion Gap 10.2 5.0 - 15.0 mmol/L   Results for orders placed or performed in visit on 12/09/20   CBC Auto Differential    Specimen: Blood   Result Value Ref Range    WBC 11.20 (H) 3.40 - 10.80 10*3/mm3    RBC 4.71 3.77 - 5.28 10*6/mm3    Hemoglobin 13.2 12.0 -  15.9 g/dL    Hematocrit 40.8 34.0 - 46.6 %    MCV 86.6 79.0 - 97.0 fL    MCH 28.0 26.6 - 33.0 pg    MCHC 32.4 31.5 - 35.7 g/dL    RDW 13.9 12.3 - 15.4 %    RDW-SD 44.2 37.0 - 54.0 fl    MPV 9.7 6.0 - 12.0 fL    Platelets 386 140 - 450 10*3/mm3    Neutrophil % 60.7 42.7 - 76.0 %    Lymphocyte % 29.1 19.6 - 45.3 %    Monocyte % 7.1 5.0 - 12.0 %    Eosinophil % 1.3 0.3 - 6.2 %    Basophil % 1.2 0.0 - 1.5 %    Immature Grans % 0.6 (H) 0.0 - 0.5 %    Neutrophils, Absolute 6.80 1.70 - 7.00 10*3/mm3    Lymphocytes, Absolute 3.26 (H) 0.70 - 3.10 10*3/mm3    Monocytes, Absolute 0.80 0.10 - 0.90 10*3/mm3    Eosinophils, Absolute 0.14 0.00 - 0.40 10*3/mm3    Basophils, Absolute 0.13 0.00 - 0.20 10*3/mm3    Immature Grans, Absolute 0.07 (H) 0.00 - 0.05 10*3/mm3    nRBC 0.0 0.0 - 0.2 /100 WBC     *Note: Due to a large number of results and/or encounters for the requested time period, some results have not been displayed. A complete set of results can be found in Results Review.

## 2021-06-23 NOTE — PATIENT INSTRUCTIONS
"[facility] (25th ed., pp. 5420-7064). [place], [place]: [employer].\">   Anemia    Anemia is a condition in which there is not enough red blood cells or hemoglobin in the blood. Hemoglobin is a substance in red blood cells that carries oxygen.  When you do not have enough red blood cells or hemoglobin (are anemic), your body cannot get enough oxygen and your organs may not work properly. As a result, you may feel very tired or have other problems.  What are the causes?  Common causes of anemia include:  · Excessive bleeding. Anemia can be caused by excessive bleeding inside or outside the body, including bleeding from the intestines or from heavy menstrual periods in females.  · Poor nutrition.  · Long-lasting (chronic) kidney, thyroid, and liver disease.  · Bone marrow disorders, spleen problems, and blood disorders.  · Cancer and treatments for cancer.  · HIV (human immunodeficiency virus) and AIDS (acquired immunodeficiency syndrome).  · Infections, medicines, and autoimmune disorders that destroy red blood cells.  What are the signs or symptoms?  Symptoms of this condition include:  · Minor weakness.  · Dizziness.  · Headache, or difficulties concentrating and sleeping.  · Heartbeats that feel irregular or faster than normal (palpitations).  · Shortness of breath, especially with exercise.  · Pale skin, lips, and nails, or cold hands and feet.  · Indigestion and nausea.  Symptoms may occur suddenly or develop slowly. If your anemia is mild, you may not have symptoms.  How is this diagnosed?  This condition is diagnosed based on blood tests, your medical history, and a physical exam. In some cases, a test may be needed in which cells are removed from the soft tissue inside of a bone and looked at under a microscope (bone marrow biopsy). Your health care provider may also check your stool (feces) for blood and may do additional testing to look for the cause of your bleeding.  Other tests may " include:  · Imaging tests, such as a CT scan or MRI.  · A procedure to see inside your esophagus and stomach (endoscopy).  · A procedure to see inside your colon and rectum (colonoscopy).  How is this treated?  Treatment for this condition depends on the cause. If you continue to lose a lot of blood, you may need to be treated at a hospital. Treatment may include:  · Taking supplements of iron, vitamin B12, or folic acid.  · Taking a hormone medicine (erythropoietin) that can help to stimulate red blood cell growth.  · Having a blood transfusion. This may be needed if you lose a lot of blood.  · Making changes to your diet.  · Having surgery to remove your spleen.  Follow these instructions at home:  · Take over-the-counter and prescription medicines only as told by your health care provider.  · Take supplements only as told by your health care provider.  · Follow any diet instructions that you were given by your health care provider.  · Keep all follow-up visits as told by your health care provider. This is important.  Contact a health care provider if:  · You develop new bleeding anywhere in the body.  Get help right away if:  · You are very weak.  · You are short of breath.  · You have pain in your abdomen or chest.  · You are dizzy or feel faint.  · You have trouble concentrating.  · You have bloody stools, black stools, or tarry stools.  · You vomit repeatedly or you vomit up blood.  These symptoms may represent a serious problem that is an emergency. Do not wait to see if the symptoms will go away. Get medical help right away. Call your local emergency services (911 in the [place] Do not drive yourself to the hospital.  Summary  · Anemia is a condition in which you do not have enough red blood cells or enough of a substance in your red blood cells that carries oxygen (hemoglobin).  · Symptoms may occur suddenly or develop slowly.  · If your anemia is mild, you may not have symptoms.  · This condition is  diagnosed with blood tests, a medical history, and a physical exam. Other tests may be needed.  · Treatment for this condition depends on the cause of the anemia.  This information is not intended to replace advice given to you by your health care provider. Make sure you discuss any questions you have with your health care provider.  Document Revised: 11/24/2020 Document Reviewed: 11/24/2020  [employer] Patient Education © 2021 [employer]

## 2021-06-28 RX ORDER — CYCLOBENZAPRINE HCL 10 MG
TABLET ORAL
Qty: 90 TABLET | Refills: 5 | Status: SHIPPED | OUTPATIENT
Start: 2021-06-28 | End: 2022-01-14

## 2021-07-02 RX ORDER — HYDROXYZINE PAMOATE 25 MG/1
CAPSULE ORAL
Qty: 90 CAPSULE | Refills: 1 | Status: SHIPPED | OUTPATIENT
Start: 2021-07-02 | End: 2021-12-29

## 2021-07-07 ENCOUNTER — LAB (OUTPATIENT)
Dept: ONCOLOGY | Facility: HOSPITAL | Age: 61
End: 2021-07-07

## 2021-07-07 ENCOUNTER — HOSPITAL ENCOUNTER (OUTPATIENT)
Dept: CT IMAGING | Facility: HOSPITAL | Age: 61
Discharge: HOME | End: 2021-07-07

## 2021-07-07 DIAGNOSIS — R53.83 OTHER FATIGUE: ICD-10-CM

## 2021-07-07 DIAGNOSIS — E61.1 IRON DEFICIENCY: ICD-10-CM

## 2021-07-07 DIAGNOSIS — Z12.2 ENCOUNTER FOR SCREENING FOR LUNG CANCER: ICD-10-CM

## 2021-07-07 LAB
BASOPHILS # BLD AUTO: 0.09 10*3/MM3 (ref 0–0.2)
BASOPHILS NFR BLD AUTO: 0.9 % (ref 0–1.5)
DEPRECATED RDW RBC AUTO: 52 FL (ref 37–54)
EOSINOPHIL # BLD AUTO: 0.26 10*3/MM3 (ref 0–0.4)
EOSINOPHIL NFR BLD AUTO: 2.7 % (ref 0.3–6.2)
ERYTHROCYTE [DISTWIDTH] IN BLOOD BY AUTOMATED COUNT: 15.9 % (ref 12.3–15.4)
FERRITIN SERPL-MCNC: 191.9 NG/ML (ref 13–150)
FOLATE SERPL-MCNC: >20 NG/ML (ref 4.78–24.2)
HCT VFR BLD AUTO: 45.4 % (ref 34–46.6)
HGB BLD-MCNC: 14.8 G/DL (ref 12–15.9)
IMM GRANULOCYTES # BLD AUTO: 0.04 10*3/MM3 (ref 0–0.05)
IMM GRANULOCYTES NFR BLD AUTO: 0.4 % (ref 0–0.5)
IRON 24H UR-MRATE: 99 MCG/DL (ref 37–145)
IRON SATN MFR SERPL: 29 % (ref 20–50)
LYMPHOCYTES # BLD AUTO: 2.36 10*3/MM3 (ref 0.7–3.1)
LYMPHOCYTES NFR BLD AUTO: 24.1 % (ref 19.6–45.3)
MCH RBC QN AUTO: 29.3 PG (ref 26.6–33)
MCHC RBC AUTO-ENTMCNC: 32.6 G/DL (ref 31.5–35.7)
MCV RBC AUTO: 89.9 FL (ref 79–97)
MONOCYTES # BLD AUTO: 0.63 10*3/MM3 (ref 0.1–0.9)
MONOCYTES NFR BLD AUTO: 6.4 % (ref 5–12)
NEUTROPHILS NFR BLD AUTO: 6.4 10*3/MM3 (ref 1.7–7)
NEUTROPHILS NFR BLD AUTO: 65.5 % (ref 42.7–76)
NRBC BLD AUTO-RTO: 0 /100 WBC (ref 0–0.2)
PLATELET # BLD AUTO: 295 10*3/MM3 (ref 140–450)
PMV BLD AUTO: 9.2 FL (ref 6–12)
RBC # BLD AUTO: 5.05 10*6/MM3 (ref 3.77–5.28)
TIBC SERPL-MCNC: 343 MCG/DL (ref 298–536)
TRANSFERRIN SERPL-MCNC: 230 MG/DL (ref 200–360)
VIT B12 BLD-MCNC: 941 PG/ML (ref 211–946)
WBC # BLD AUTO: 9.78 10*3/MM3 (ref 3.4–10.8)

## 2021-07-07 PROCEDURE — 82607 VITAMIN B-12: CPT

## 2021-07-07 PROCEDURE — 84466 ASSAY OF TRANSFERRIN: CPT

## 2021-07-07 PROCEDURE — 36415 COLL VENOUS BLD VENIPUNCTURE: CPT | Performed by: INTERNAL MEDICINE

## 2021-07-07 PROCEDURE — 82746 ASSAY OF FOLIC ACID SERUM: CPT

## 2021-07-07 PROCEDURE — 71271 CT THORAX LUNG CANCER SCR C-: CPT

## 2021-07-07 PROCEDURE — 83540 ASSAY OF IRON: CPT

## 2021-07-07 PROCEDURE — 85025 COMPLETE CBC W/AUTO DIFF WBC: CPT

## 2021-07-07 PROCEDURE — 82728 ASSAY OF FERRITIN: CPT

## 2021-07-09 ENCOUNTER — OFFICE VISIT (OUTPATIENT)
Dept: ONCOLOGY | Facility: CLINIC | Age: 61
End: 2021-07-09

## 2021-07-09 VITALS
HEART RATE: 89 BPM | TEMPERATURE: 98.7 F | WEIGHT: 153.4 LBS | DIASTOLIC BLOOD PRESSURE: 69 MMHG | RESPIRATION RATE: 18 BRPM | SYSTOLIC BLOOD PRESSURE: 128 MMHG | HEIGHT: 63 IN | BODY MASS INDEX: 27.18 KG/M2

## 2021-07-09 DIAGNOSIS — D50.0 IRON DEFICIENCY ANEMIA DUE TO CHRONIC BLOOD LOSS: Chronic | ICD-10-CM

## 2021-07-09 DIAGNOSIS — T45.4X5A ADVERSE EFFECT OF IRON, INITIAL ENCOUNTER: ICD-10-CM

## 2021-07-09 DIAGNOSIS — E53.8 FOLATE DEFICIENCY: Primary | Chronic | ICD-10-CM

## 2021-07-09 DIAGNOSIS — N28.9 KIDNEY LESION: ICD-10-CM

## 2021-07-09 DIAGNOSIS — Z12.2 ENCOUNTER FOR SCREENING FOR LUNG CANCER: ICD-10-CM

## 2021-07-09 PROBLEM — D50.9 IRON DEFICIENCY ANEMIA: Chronic | Status: ACTIVE | Noted: 2021-05-11

## 2021-07-09 PROCEDURE — G0463 HOSPITAL OUTPT CLINIC VISIT: HCPCS | Performed by: INTERNAL MEDICINE

## 2021-07-09 PROCEDURE — 99214 OFFICE O/P EST MOD 30 MIN: CPT | Performed by: INTERNAL MEDICINE

## 2021-07-09 NOTE — PROGRESS NOTES
"DATE OF VISIT: 7/9/2021      REASON FOR VISIT: Iron deficiency, screening for lung cancer, fatigue, kidney lesion      HISTORY OF PRESENT ILLNESS:   60-year-old female with medical problem consisting of hypertension, dyslipidemia, anxiety, bipolar disorder, questionable history of leukemia who was initially seen in consultation on May 7, 2021 for evaluation and recommendation regarding iron deficiency anemia and fatigue.  Patient was not able to tolerate iron by mouth and received 5 doses of Venofer in May 2021.  Patient also had a low-dose CT of chest done for lung cancer screening.  Patient is here for follow-up appointment today.  States her iron infusion did not help her with fatigue.  Complains of intermittent lower extremity pain with exertion.  Denies any bleeding.  Denies any new lymph node enlargement.  Complains of chronic shortness of breath and arthritis pain.      Past Medical History, Past Surgical History, Social History, Family History have been reviewed and are without significant changes except as mentioned.    Review of Systems   A comprehensive 14 point review of systems was performed and was negative except as mentioned in HPI.    Medications:  The current medication list was reviewed in the EMR    ALLERGIES:  No Known Allergies    Objective      Vitals:    07/09/21 1322   BP: 128/69   Pulse: 89   Resp: 18   Temp: 98.7 °F (37.1 °C)   TempSrc: Temporal   Weight: 69.6 kg (153 lb 6.4 oz)   Height: 160 cm (62.99\")   PainSc:   5   PainLoc: Generalized  Comment: feet     Current Status 7/9/2021   ECOG score 0       Physical Exam  Cardiovascular:      Rate and Rhythm: Normal rate and regular rhythm.   Pulmonary:      Breath sounds: Normal breath sounds.   Neurological:      Mental Status: She is alert and oriented to person, place, and time.           RECENT LABS:  Glucose   Date Value Ref Range Status   04/16/2021 83 65 - 99 mg/dL Final     Sodium   Date Value Ref Range Status   04/16/2021 139 136 - " 145 mmol/L Final     Potassium   Date Value Ref Range Status   04/16/2021 4.2 3.5 - 5.2 mmol/L Final     CO2   Date Value Ref Range Status   04/16/2021 25.8 22.0 - 29.0 mmol/L Final     Chloride   Date Value Ref Range Status   04/16/2021 103 98 - 107 mmol/L Final     Anion Gap   Date Value Ref Range Status   04/16/2021 10.2 5.0 - 15.0 mmol/L Final     Creatinine   Date Value Ref Range Status   04/16/2021 0.95 0.57 - 1.00 mg/dL Final     BUN   Date Value Ref Range Status   04/16/2021 13 6 - 20 mg/dL Final     BUN/Creatinine Ratio   Date Value Ref Range Status   04/16/2021 13.7 7.0 - 25.0 Final     Calcium   Date Value Ref Range Status   04/16/2021 9.9 8.6 - 10.5 mg/dL Final     eGFR Non  Amer   Date Value Ref Range Status   04/16/2021 60 (L) >60 mL/min/1.73 Final     Alkaline Phosphatase   Date Value Ref Range Status   04/16/2021 125 (H) 39 - 117 U/L Final     Total Protein   Date Value Ref Range Status   04/16/2021 6.9 6.0 - 8.5 g/dL Final     ALT (SGPT)   Date Value Ref Range Status   04/16/2021 9 1 - 33 U/L Final     AST (SGOT)   Date Value Ref Range Status   04/16/2021 12 1 - 32 U/L Final     Total Bilirubin   Date Value Ref Range Status   04/16/2021 0.2 0.0 - 1.2 mg/dL Final     Albumin   Date Value Ref Range Status   04/16/2021 4.60 3.50 - 5.20 g/dL Final     Globulin   Date Value Ref Range Status   04/16/2021 2.3 gm/dL Final     Lab Results   Component Value Date    WBC 9.78 07/07/2021    HGB 14.8 07/07/2021    HCT 45.4 07/07/2021    MCV 89.9 07/07/2021     07/07/2021     Lab Results   Component Value Date    NEUTROABS 6.40 07/07/2021    IRON 99 07/07/2021    IRON 27 (L) 05/07/2021    IRON 42 04/16/2021    TIBC 343 07/07/2021    TIBC 519 05/07/2021    TIBC 504 04/16/2021    LABIRON 29 07/07/2021    LABIRON 5 (L) 05/07/2021    LABIRON 8 (L) 04/16/2021    FERRITIN 191.90 (H) 07/07/2021    FERRITIN 12.40 (L) 05/07/2021    FERRITIN 14.70 04/16/2021    KSRSKCCT10 941 07/07/2021    [id number] 258  05/07/2021    [id number] 283 04/16/2021    FOLATE >20.00 07/07/2021    FOLATE 3.58 (L) 05/07/2021    FOLATE >20.00 10/21/2015     No results found for: , LABCA2, AFPTM, HCGQUANT, , CHROMGRNA, 6HXPW00EDT, CEA, REFLABREPO      PATHOLOGY:  * Cannot find OR log *         RADIOLOGY DATA :  CT Chest Low Dose Cancer Screening WO    Result Date: 7/8/2021  Impression: 1.  No lung nodule identified. 2.  Very mild centrilobular emphysematous changes. 3.  Evidence of old granulomatous disease. 4.  Left upper lobe lingula small focus of discoid atelectasis. 5.  Left kidney upper pole circular hypodense lesion which should be further characterized with renal ultrasound or CT abdomen without and with contrast. 6.  RCA, LAD, and left circumflex atherosclerotic vascular calcifications. LUNG RADS:  Category 1: Negative Modifier: Electronically signed by:  Shaun Benjamin MD  7/8/2021 9:05 AM CDT Workstation: [id number]          Assessment/Plan     1.  Iron deficiency:  -Due to iron deficiency with iron saturation of 5% and ferritin of 12 patient received 5 dose of intravenous Venofer in May 2021  -Patient was not able to tolerate iron by mouth  -Anemia work-up done on July 7, 2021 showed hemoglobin is 10.8.  Iron studies are adequate.  No need for any more intravenous iron replacement at present  -Patient had EGD and colonoscopy done on Erin 15, 2021 by Dr. Woodward and that did not show any evidence of bleeding.  -Recommend continue with B12 and folic acid daily until next clinic visit in 4 months.  -We will ask patient to return to clinic in 4 months with repeat CBC, iron studies, ferritin, B12 and folate to be done prior to that    2.  Screening for lung cancer:  -Patient has 45-pack-year smoking history  -Low-dose CT of chest without contrast done on July 7, 2021 was lung grades category 1.  Next CT scan will be due in July 2022.    3.  Folate deficiency:  -Recommend continue with folic acid 1 mg p.o. daily    4.  Left  kidney lesion:  -Low-dose CT of chest shows kidney lesion which was hypodense in left kidney.  -We will get a renal ultrasound for further evaluation of kidney lesion.  Will call patient with result of renal ultrasound    5.  Lower extremity pain with exertion:  -Patient symptoms are suggestive of claudication.  Option of referring her to vascular surgery clinic was discussed with patient.  Patient wants to hold off on any referral to surgery clinic at present.    6.  Health maintenance: Unfortunately patient continues to smoke.  She was counseled about smoking cessation    7. Advance Care Planning: For now patient remains full code and is able to make decisions.  Patient has health care surrogate mentioned on chart.                 PHQ-9 Total Score: 0   -Patient is not homicidal or suicidal.  No acute intervention required.    Ga Dubois Reji reports a pain score of 5.  Given her pain assessment as noted, treatment options were discussed and the following options were decided upon as a follow-up plan to address the patient's pain: continuation of current treatment plan for pain.         Po Park MD  7/9/2021  13:42 CDT        Part of this note may be an electronic transcription/translation of spoken language to printed text using the Dragon Dictation System.          CC:

## 2021-07-16 ENCOUNTER — HOSPITAL ENCOUNTER (OUTPATIENT)
Dept: ULTRASOUND IMAGING | Facility: HOSPITAL | Age: 61
Discharge: HOME | End: 2021-07-16
Admitting: INTERNAL MEDICINE

## 2021-07-16 DIAGNOSIS — N28.9 KIDNEY LESION: ICD-10-CM

## 2021-07-16 PROCEDURE — 76775 US EXAM ABDO BACK WALL LIM: CPT

## 2021-07-20 ENCOUNTER — TELEPHONE (OUTPATIENT)
Dept: ONCOLOGY | Facility: CLINIC | Age: 61
End: 2021-07-20

## 2021-07-21 DIAGNOSIS — N28.89 RENAL MASS, LEFT: Primary | ICD-10-CM

## 2021-07-21 NOTE — PROGRESS NOTES
Renal ultrasound done on July 16, 2021 shows 2.8 centimeter mass affecting left kidney.  Results of ultrasound were discussed with patient.  Will order CT of abdomen and pelvis with contrast for further evaluation of renal mass.  Recommendation were discussed with patient and she is agreeable to plan.

## 2021-07-21 NOTE — TELEPHONE ENCOUNTER
Spoke with patient.  Ordered a CT scan of abdomen and pelvis with contrast, see if he can get it done before the end of this week.  If not then schedule her towards end of next week.  Thank you

## 2021-07-27 ENCOUNTER — TRANSCRIBE ORDERS (OUTPATIENT)
Dept: GENERAL RADIOLOGY | Facility: HOSPITAL | Age: 61
End: 2021-07-27

## 2021-07-27 DIAGNOSIS — N28.89 RENAL MASS, LEFT: Primary | ICD-10-CM

## 2021-07-30 ENCOUNTER — HOSPITAL ENCOUNTER (OUTPATIENT)
Dept: CT IMAGING | Facility: HOSPITAL | Age: 61
Discharge: HOME | End: 2021-07-30

## 2021-07-30 ENCOUNTER — LAB (OUTPATIENT)
Dept: LAB | Facility: HOSPITAL | Age: 61
End: 2021-07-30

## 2021-07-30 DIAGNOSIS — N28.89 RENAL MASS, LEFT: ICD-10-CM

## 2021-07-30 LAB
BUN SERPL-MCNC: 10 MG/DL (ref 6–20)
CREAT SERPL-MCNC: 0.73 MG/DL (ref 0.57–1)
GFR SERPL CREATININE-BSD FRML MDRD: 82 ML/MIN/1.73

## 2021-07-30 PROCEDURE — 82565 ASSAY OF CREATININE: CPT

## 2021-07-30 PROCEDURE — 25010000002 IOPAMIDOL 61 % SOLUTION: Performed by: INTERNAL MEDICINE

## 2021-07-30 PROCEDURE — 36415 COLL VENOUS BLD VENIPUNCTURE: CPT

## 2021-07-30 PROCEDURE — 84520 ASSAY OF UREA NITROGEN: CPT

## 2021-07-30 PROCEDURE — 74177 CT ABD & PELVIS W/CONTRAST: CPT

## 2021-07-30 RX ADMIN — IOPAMIDOL 90 ML: 612 INJECTION, SOLUTION INTRAVENOUS at 13:35

## 2021-08-03 DIAGNOSIS — N28.89 RENAL MASS, LEFT: Primary | ICD-10-CM

## 2021-08-03 NOTE — PROGRESS NOTES
Patient had a CT scan done on abdomen and pelvis on July 30, 2021 for evaluation of left kidney lesion on ultrasound.  CT scan shows 2.4 cm lobulated mass arising from left upper kidney with some internal enhancement.  Renal cell cancer cannot be excluded based on finding on CT scan.  Result of CT scan were discussed with patient over the phone today.  Urology evaluation was recommended.  Patient is requesting referral to urology group at [facility].  Referral has been placed today.

## 2021-08-13 ENCOUNTER — TELEPHONE (OUTPATIENT)
Dept: ONCOLOGY | Facility: CLINIC | Age: 61
End: 2021-08-13

## 2021-08-18 NOTE — PROGRESS NOTES
Subjective    Ms. Mendoza is 60 y.o. female    Chief Complaint: Renal mass    History of Present Illness  60-year-old female new patient referred for 2.4 cm mesophytic left small renal mass.  Her  is my patient.  This was identified incidentally on the lower cuts of the chest CT.  She had a bilateral renal ultrasound in [place] reviewed by me with the patient today on 7/16/2021 demonstrating a solid-appearing mixed echogenic small left renal lesion.  She had a CT abdomen and pelvis in [place] on 7/30/2021 also reviewed by me with the patient today confirming 2.4 cm small left renal mass with  enhancing internal components.      I independently visualized and reviewed the patient's prior imaging studies today in clinic and discussed the imaging findings with the patient.      The following portions of the patient's history were reviewed and updated as appropriate: allergies, current medications, past family history, past medical history, past social history, past surgical history and problem list.    Review of Systems      Current Outpatient Medications:   •  albuterol (ACCUNEB) 1.25 MG/3ML nebulizer solution, Take 3 mL by nebulization Every 6 (Six) Hours As Needed for Wheezing or Shortness of Air., Disp: 30 vial, Rfl: 2  •  amitriptyline (ELAVIL) 75 MG tablet, TAKE 1 TABLET EVERY NIGHT, Disp: 90 tablet, Rfl: 2  •  ascorbic acid (VITAMIN C) 250 MG tablet, Take 500 mg by mouth Daily., Disp: , Rfl:   •  aspirin 81 MG tablet, Take 81 mg by mouth., Disp: , Rfl:   •  azelastine (ASTELIN) 0.1 % nasal spray, 2 sprays into the nostril(s) as directed by provider As Needed for Rhinitis. Use in each nostril as directed, Disp: , Rfl:   •  B Complex Vitamins (VITAMIN B COMPLEX PO), Take 1 tablet by mouth Daily., Disp: , Rfl:   •  Biotin 1000 MCG tablet, Take 1,000 mcg by mouth., Disp: , Rfl:   •  budesonide-formoterol (Symbicort) 160-4.5 MCG/ACT inhaler, Inhale 2 puffs 2 (Two) Times a Day., Disp: 10.2 g, Rfl:  12  •  Buprenorphine 20 MCG/HR patch weekly, Place 1 patch on the skin 1 (One) Time Per Week., Disp: 4 patch, Rfl: 4  •  busPIRone (BUSPAR) 7.5 MG tablet, TAKE 1 TABLET 3 TIMES A DAY, Disp: 270 tablet, Rfl: 2  •  Calcium Citrate-Vitamin D (CALCIUM CITRATE + D PO), Take 1 tablet by mouth Daily., Disp: , Rfl:   •  Cholecalciferol (VITAMIN D3) 1000 units capsule, , Disp: , Rfl: 5  •  cyclobenzaprine (FLEXERIL) 10 MG tablet, TAKE 1 TABLET 3 TIMES A DAYAS NEEDED FOR MUSCLE       SPASMS, Disp: 90 tablet, Rfl: 5  •  famotidine (PEPCID) 40 MG tablet, TAKE 1 TABLET DAILY, Disp: 90 tablet, Rfl: 3  •  folic acid (FOLVITE) 1 MG tablet, Take 1 tablet by mouth Daily., Disp: 90 tablet, Rfl: 1  •  hydrOXYzine pamoate (VISTARIL) 25 MG capsule, TAKE 1 CAPSULE EVERY NIGHT AT BEDTIME, Disp: 90 capsule, Rfl: 1  •  lamoTRIgine (LaMICtal) 200 MG tablet, TAKE 1 TABLET DAILY., Disp: 90 tablet, Rfl: 1  •  linaclotide (Linzess) 145 MCG capsule capsule, Take 1 capsule by mouth Every Morning Before Breakfast., Disp: 90 capsule, Rfl: 0  •  lisinopril (PRINIVIL,ZESTRIL) 40 MG tablet, TAKE 1 TABLET DAILY, Disp: 90 tablet, Rfl: 3  •  loratadine (CLARITIN) 10 MG tablet, Take 10 mg by mouth., Disp: , Rfl:   •  metoprolol tartrate (LOPRESSOR) 25 MG tablet, TAKE 1 TABLET TWICE A DAY, Disp: 180 tablet, Rfl: 3  •  Milk Thistle 1000 MG capsule, Take  by mouth., Disp: , Rfl:   •  Multiple Vitamin (MULTIVITAMIN+ PO), Take 1 tablet by mouth Daily., Disp: , Rfl:   •  Naloxegol Oxalate (MOVANTIK) 25 MG tablet, Take 25 mg by mouth., Disp: , Rfl:   •  Niacin, Antihyperlipidemic, 500 MG tablet, Take  by mouth., Disp: , Rfl:   •  pregabalin (LYRICA) 200 MG capsule, Take 200 mg by mouth 2 (Two) Times a Day., Disp: , Rfl:   •  rOPINIRole (REQUIP) 0.25 MG tablet, TAKE 1 TABLET NIGHTLY 1    HOUR BEFORE BEDTIME AS     NEEDED FOR RESTLESS LEG    SYMPTOMS, Disp: 90 tablet, Rfl: 3  •  rosuvastatin (CRESTOR) 20 MG tablet, TAKE 1 TABLET DAILY, Disp: 90 tablet, Rfl: 3  •   topiramate (TOPAMAX) 100 MG tablet, TAKE 1 TABLET EVERY EVENING, Disp: 90 tablet, Rfl: 2  •  topiramate (TOPAMAX) 50 MG tablet, TAKE 1 TABLET DAILY, Disp: 90 tablet, Rfl: 2  •  Trintellix 20 MG tablet, TAKE 1 TABLET DAILY, Disp: 90 tablet, Rfl: 3  •  vitamin B-12 (CYANOCOBALAMIN) 1000 MCG tablet, , Disp: , Rfl: 3  •  vitamin E 400 UNIT capsule, Take 400 Units by mouth Daily., Disp: , Rfl:     Past Medical History:   Diagnosis Date   • Anemia    • Anxiety     Anxiety state      • Bipolar affective disorder, current episode depressed (CMS/HCC)    • Bipolar disorder (CMS/HCC)    • Breast cyst    • Drug therapy     Long-term drug therapy      • Essential hypertension    • Headache    • History of transfusion    • Hyperlipidemia    • Influenza    • Liver function test abnormality     Abnormal liver test   • Neuropathic pain    • Obesity    • Pain of breast     No evidence of cancer      • Urinary tract infectious disease        Past Surgical History:   Procedure Laterality Date   • APPENDECTOMY      Appendectomy (1)      • BREAST BIOPSY     • COLONOSCOPY N/A 2/14/2019    Procedure: COLONOSCOPY;  Surgeon: Jaiyeola, Aderemi, MD;  Location: [facility] ENDOSCOPY;  Service: General   • COLONOSCOPY N/A 6/15/2021    Procedure: COLONOSCOPY;  Surgeon: Erickson Woodward MD;  Location: [facility] ENDOSCOPY;  Service: Gastroenterology;  Laterality: N/A;   • DIAGNOSTIC LAPAROSCOPY  03/04/2003    Laparosc diagnostic (1)      • ENDOSCOPY N/A 6/15/2021    Procedure: ESOPHAGOGASTRODUODENOSCOPY;  Surgeon: Erickson Woodwadr MD;  Location: [facility] ENDOSCOPY;  Service: Gastroenterology;  Laterality: N/A;   • ENDOSCOPY W/ PEG TUBE PLACEMENT  03/21/2003    EGD w/ tube [id number] (1)      • LAPAROSCOPY ESOPHAGOGASTRIC FUNDOPLASTY HYBRID  02/10/2005    Fundoplasty, laparoscopic (1)      • PAP SMEAR  08/18/2009    PAP SMEAR (1)      • TOTAL ABDOMINAL HYSTERECTOMY WITH SALPINGO OOPHORECTOMY  03/25/2003    JOSE/BSO (1)          Social History     Socioeconomic  "History   • Marital status:      Spouse name: Not on file   • Number of children: Not on file   • Years of education: Not on file   • Highest education level: Not on file   Tobacco Use   • Smoking status: Current Every Day Smoker     Packs/day: 1.00     Years: 25.00     Pack years: 25.00     Types: Cigarettes   • Smokeless tobacco: Never Used   Vaping Use   • Vaping Use: Never used   Substance and Sexual Activity   • Alcohol use: No   • Drug use: No   • Sexual activity: Defer       Family History   Problem Relation Age of Onset   • Depression Other    • Diabetes Other    • Heart disease Other    • Hypertension Other    • Stroke Other    • Lung disease Other    • Other Other         Colon problems   • COPD Mother    • Heart disease Father    • Lung disease Father    • Hypertension Sister    • Hypertension Brother    • Fibromyalgia Daughter    • Anxiety disorder Son    • Diabetes Brother    • No Known Problems Daughter        Objective    Temp 97.9 °F (36.6 °C)   Ht 160 cm (63\")   Wt 69.3 kg (152 lb 12.8 oz)   BMI 27.07 kg/m²     Physical Exam        Results for orders placed or performed in visit on 08/23/21   POC Urinalysis Dipstick, Multipro    Specimen: Urine   Result Value Ref Range    Color Yellow Yellow, Straw, Dark Yellow, Terri    Clarity, UA Clear Clear    Glucose, UA Negative Negative, 1000 mg/dL (3+) mg/dL    Bilirubin Negative Negative    Ketones, UA Negative Negative    Specific Gravity  1.010 1.005 - 1.030    Blood, UA Negative Negative    pH, Urine 6.5 5.0 - 8.0    Protein, POC Negative Negative mg/dL    Urobilinogen, UA Normal Normal    Nitrite, UA Negative Negative    Leukocytes Negative Negative     Assessment and Plan    Diagnoses and all orders for this visit:    1. Renal mass (Primary)  -     POC Urinalysis Dipstick, Multipro    2. Essential hypertension      We had a long discussion regarding the natural history of small renal masses.  We discussed the high probability of malignancy " given the internal enhancement characteristics on her CT scan.  We discussed options for management including active surveillance, open partial nephrectomy, robotic assisted laparoscopic approach including both radical nephrectomy and attempted partial nephrectomy, or referral to tertiary center for percutaneous ablative techniques.  Patient is very concerned regarding possible recurrence of cancer and does not want to tolerate chance of recurrence and/or complications with partial nephrectomy.  She stated she would prefer to proceed with laparoscopic radical nephrectomy.  I have set her up to meet Dr. Olivares next week to discuss robotic assisted laparoscopic left nephrectomy.      This document has been signed by PAWAN Waller MD on August 23, 2021 18:31 CDT

## 2021-08-23 ENCOUNTER — OFFICE VISIT (OUTPATIENT)
Dept: UROLOGY | Facility: CLINIC | Age: 61
End: 2021-08-23

## 2021-08-23 VITALS — BODY MASS INDEX: 27.07 KG/M2 | HEIGHT: 63 IN | TEMPERATURE: 97.9 F | WEIGHT: 152.8 LBS

## 2021-08-23 DIAGNOSIS — I10 ESSENTIAL HYPERTENSION: ICD-10-CM

## 2021-08-23 DIAGNOSIS — N28.89 RENAL MASS: Primary | ICD-10-CM

## 2021-08-23 LAB
BILIRUB BLD-MCNC: NEGATIVE MG/DL
CLARITY, POC: CLEAR
COLOR UR: YELLOW
GLUCOSE UR STRIP-MCNC: NEGATIVE MG/DL
KETONES UR QL: NEGATIVE
LEUKOCYTE EST, POC: NEGATIVE
NITRITE UR-MCNC: NEGATIVE MG/ML
PH UR: 6.5 [PH] (ref 5–8)
PROT UR STRIP-MCNC: NEGATIVE MG/DL
RBC # UR STRIP: NEGATIVE /UL
SP GR UR: 1.01 (ref 1–1.03)
UROBILINOGEN UR QL: NORMAL

## 2021-08-23 PROCEDURE — 99204 OFFICE O/P NEW MOD 45 MIN: CPT | Performed by: UROLOGY

## 2021-08-23 PROCEDURE — 81001 URINALYSIS AUTO W/SCOPE: CPT | Performed by: UROLOGY

## 2021-08-23 NOTE — PATIENT INSTRUCTIONS
"BMI for Adults  What is BMI?  Body mass index (BMI) is a number that is calculated from a person's weight and height. BMI can help estimate how much of a person's weight is composed of fat. BMI does not measure body fat directly. Rather, it is an alternative to procedures that directly measure body fat, which can be difficult and expensive.  BMI can help identify people who may be at higher risk for certain medical problems.  What are BMI measurements used for?  BMI is used as a screening tool to identify possible weight problems. It helps determine whether a person is obese, overweight, a healthy weight, or underweight.  BMI is useful for:  · Identifying a weight problem that may be related to a medical condition or may increase the risk for medical problems.  · Promoting changes, such as changes in diet and exercise, to help reach a healthy weight. BMI screening can be repeated to see if these changes are working.  How is BMI calculated?  BMI involves measuring your weight in relation to your height. Both height and weight are measured, and the BMI is calculated from those numbers. This can be done either in English (U[place] or metric measurements. Note that charts and online BMI calculators are available to help you find your BMI quickly and easily without having to do these calculations yourself.  To calculate your BMI in English (U.S[place] measurements:    1. Measure your weight in pounds (lb).  2. Multiply the number of pounds by 703.  ? For example, for a person who weighs 180 lb, multiply that number by 703, which equals 126,540.  3. Measure your height in inches. Then multiply that number by itself to get a measurement called \"inches squared.\"  ? For example, for a person who is 70 inches tall, the \"inches squared\" measurement is 70 inches x 70 inches, which equals 4,900 inches squared.  4. Divide the total from step 2 (number of lb x 703) by the total from step 3 (inches squared): 126,540 ÷ 4,900 = 25.8. This is " "your BMI.  To calculate your BMI in metric measurements:  1. Measure your weight in kilograms (kg).  2. Measure your height in meters (m). Then multiply that number by itself to get a measurement called \"meters squared.\"  ? For example, for a person who is 1.75 m tall, the \"meters squared\" measurement is 1.75 m x 1.75 m, which is equal to 3.1 meters squared.  3. Divide the number of kilograms (your weight) by the meters squared number. In this example: 70 ÷ 3.1 = 22.6. This is your BMI.  What do the results mean?  BMI charts are used to identify whether you are underweight, normal weight, overweight, or obese. The following guidelines will be used:  · Underweight: BMI less than 18.5.  · Normal weight: BMI between 18.5 and 24.9.  · Overweight: BMI between 25 and 29.9.  · Obese: BMI of 30 or above.  Keep these notes in mind:  · Weight includes both fat and muscle, so someone with a muscular build, such as an athlete, may have a BMI that is higher than 24.9. In cases like these, BMI is not an accurate measure of body fat.  · To determine if excess body fat is the cause of a BMI of 25 or higher, further assessments may need to be done by a health care provider.  · BMI is usually interpreted in the same way for men and women.  Where to find more information  For more information about BMI, including tools to quickly calculate your BMI, go to these websites:  · Centers for Disease Control and Prevention: www.cdc.gov  · American Heart Association: www.heart.org  · National Heart, Lung, and Blood [place]: www.nhlbi.nih.gov  Summary  · Body mass index (BMI) is a number that is calculated from a person's weight and height.  · BMI may help estimate how much of a person's weight is composed of fat. BMI can help identify those who may be at higher risk for certain medical problems.  · BMI can be measured using English measurements or metric measurements.  · BMI charts are used to identify whether you are underweight, normal " weight, overweight, or obese.  This information is not intended to replace advice given to you by your health care provider. Make sure you discuss any questions you have with your health care provider.  Document Revised: 09/09/2020 Document Reviewed: 07/17/2020  Elsevier Patient Education © 2021 [employer]

## 2021-08-27 NOTE — PROGRESS NOTES
Chief Complaint  Discuss Robotic Nephrectomy    Subjective          Ga Mendoza presents to [facility] UROLOGY for:  History of Present Illness  Renal mass  The patient presents with a left renal mass. This was first identified approximately 6 weeks ago. This was found in the context of an evaluation of an unrelated illness on a chest ct. This is a new diagnois problem. The onset was unknown. Associated symptoms/signs to consider: no evidence of hematuria, flank or abdominal pain.        Current Outpatient Medications:   •  albuterol (ACCUNEB) 1.25 MG/3ML nebulizer solution, Take 3 mL by nebulization Every 6 (Six) Hours As Needed for Wheezing or Shortness of Air., Disp: 30 vial, Rfl: 2  •  amitriptyline (ELAVIL) 75 MG tablet, TAKE 1 TABLET EVERY NIGHT, Disp: 90 tablet, Rfl: 2  •  ascorbic acid (VITAMIN C) 250 MG tablet, Take 500 mg by mouth Daily., Disp: , Rfl:   •  aspirin 81 MG tablet, Take 81 mg by mouth., Disp: , Rfl:   •  azelastine (ASTELIN) 0.1 % nasal spray, 2 sprays into the nostril(s) as directed by provider As Needed for Rhinitis. Use in each nostril as directed, Disp: , Rfl:   •  B Complex Vitamins (VITAMIN B COMPLEX PO), Take 1 tablet by mouth Daily., Disp: , Rfl:   •  Biotin 1000 MCG tablet, Take 1,000 mcg by mouth., Disp: , Rfl:   •  budesonide-formoterol (Symbicort) 160-4.5 MCG/ACT inhaler, Inhale 2 puffs 2 (Two) Times a Day., Disp: 10.2 g, Rfl: 12  •  Buprenorphine 20 MCG/HR patch weekly, Place 1 patch on the skin 1 (One) Time Per Week., Disp: 4 patch, Rfl: 4  •  busPIRone (BUSPAR) 7.5 MG tablet, TAKE 1 TABLET 3 TIMES A DAY, Disp: 270 tablet, Rfl: 2  •  Calcium Citrate-Vitamin D (CALCIUM CITRATE + D PO), Take 1 tablet by mouth Daily., Disp: , Rfl:   •  Cholecalciferol (VITAMIN D3) 1000 units capsule, , Disp: , Rfl: 5  •  cyclobenzaprine (FLEXERIL) 10 MG tablet, TAKE 1 TABLET 3 TIMES A DAYAS NEEDED FOR MUSCLE       SPASMS, Disp: 90 tablet, Rfl: 5  •  famotidine (PEPCID) 40  MG tablet, TAKE 1 TABLET DAILY, Disp: 90 tablet, Rfl: 3  •  folic acid (FOLVITE) 1 MG tablet, Take 1 tablet by mouth Daily., Disp: 90 tablet, Rfl: 1  •  hydrOXYzine pamoate (VISTARIL) 25 MG capsule, TAKE 1 CAPSULE EVERY NIGHT AT BEDTIME, Disp: 90 capsule, Rfl: 1  •  lamoTRIgine (LaMICtal) 200 MG tablet, TAKE 1 TABLET DAILY., Disp: 90 tablet, Rfl: 1  •  linaclotide (Linzess) 145 MCG capsule capsule, Take 1 capsule by mouth Every Morning Before Breakfast., Disp: 90 capsule, Rfl: 0  •  lisinopril (PRINIVIL,ZESTRIL) 40 MG tablet, TAKE 1 TABLET DAILY, Disp: 90 tablet, Rfl: 3  •  loratadine (CLARITIN) 10 MG tablet, Take 10 mg by mouth., Disp: , Rfl:   •  metoprolol tartrate (LOPRESSOR) 25 MG tablet, TAKE 1 TABLET TWICE A DAY, Disp: 180 tablet, Rfl: 3  •  Milk Thistle 1000 MG capsule, Take  by mouth., Disp: , Rfl:   •  Multiple Vitamin (MULTIVITAMIN+ PO), Take 1 tablet by mouth Daily., Disp: , Rfl:   •  Naloxegol Oxalate (MOVANTIK) 25 MG tablet, Take 25 mg by mouth., Disp: , Rfl:   •  Niacin, Antihyperlipidemic, 500 MG tablet, Take  by mouth., Disp: , Rfl:   •  pregabalin (LYRICA) 200 MG capsule, Take 200 mg by mouth 2 (Two) Times a Day., Disp: , Rfl:   •  rOPINIRole (REQUIP) 0.25 MG tablet, TAKE 1 TABLET NIGHTLY 1    HOUR BEFORE BEDTIME AS     NEEDED FOR RESTLESS LEG    SYMPTOMS, Disp: 90 tablet, Rfl: 3  •  rosuvastatin (CRESTOR) 20 MG tablet, TAKE 1 TABLET DAILY, Disp: 90 tablet, Rfl: 3  •  topiramate (TOPAMAX) 100 MG tablet, TAKE 1 TABLET EVERY EVENING, Disp: 90 tablet, Rfl: 2  •  topiramate (TOPAMAX) 50 MG tablet, TAKE 1 TABLET DAILY, Disp: 90 tablet, Rfl: 2  •  Trintellix 20 MG tablet, TAKE 1 TABLET DAILY, Disp: 90 tablet, Rfl: 3  •  vitamin B-12 (CYANOCOBALAMIN) 1000 MCG tablet, , Disp: , Rfl: 3  •  vitamin E 400 UNIT capsule, Take 400 Units by mouth Daily., Disp: , Rfl:   Past Medical History:   Diagnosis Date   • Anemia    • Anxiety     Anxiety state      • Bipolar affective disorder, current episode depressed  (CMS/HCC)    • Bipolar disorder (CMS/HCC)    • Breast cyst    • Drug therapy     Long-term drug therapy      • Essential hypertension    • Headache    • History of transfusion    • Hyperlipidemia    • Influenza    • Liver function test abnormality     Abnormal liver test   • Neuropathic pain    • Obesity    • Pain of breast     No evidence of cancer      • Urinary tract infectious disease      Past Surgical History:   Procedure Laterality Date   • APPENDECTOMY      Appendectomy (1)      • BREAST BIOPSY     • COLONOSCOPY N/A 2/14/2019    Procedure: COLONOSCOPY;  Surgeon: Jaiyeola, Aderemi, MD;  Location: [facility] ENDOSCOPY;  Service: General   • COLONOSCOPY N/A 6/15/2021    Procedure: COLONOSCOPY;  Surgeon: Erickson Woodward MD;  Location: [facility] ENDOSCOPY;  Service: Gastroenterology;  Laterality: N/A;   • DIAGNOSTIC LAPAROSCOPY  03/04/2003    Laparosc diagnostic (1)      • ENDOSCOPY N/A 6/15/2021    Procedure: ESOPHAGOGASTRODUODENOSCOPY;  Surgeon: Erickson Woodward MD;  Location: [facility] ENDOSCOPY;  Service: Gastroenterology;  Laterality: N/A;   • ENDOSCOPY W/ PEG TUBE PLACEMENT  03/21/2003    EGD w/ tube [id number] (1)      • LAPAROSCOPY ESOPHAGOGASTRIC FUNDOPLASTY HYBRID  02/10/2005    Fundoplasty, laparoscopic (1)      • PAP SMEAR  08/18/2009    PAP SMEAR (1)      • TOTAL ABDOMINAL HYSTERECTOMY WITH SALPINGO OOPHORECTOMY  03/25/2003    JOSE/BSO (1)            Review  of systems  Constitutional: Negative for chills and fever.   Gastrointestinal: Negative for abdominal pain, anal bleeding and blood in stool.   Genitourinary: Negative for flank pain and hematuria.       Objective   PHYSICAL EXAM  Vital Signs:   There were no vitals taken for this visit.    Constitutional: Patient is without distress or deformity.  Vital signs are reviewed as above.    Neuro: No confusion; No disorientation; Alert and oriented  Pulmonary: No respiratory distress.   Skin: No pallor or diaphoresis      DATA  Result Review :         Results for  "orders placed or performed in visit on 08/23/21   POC Urinalysis Dipstick, Multipro    Specimen: Urine   Result Value Ref Range    Color Yellow Yellow, Straw, Dark Yellow, Terri    Clarity, UA Clear Clear    Glucose, UA Negative Negative, 1000 mg/dL (3+) mg/dL    Bilirubin Negative Negative    Ketones, UA Negative Negative    Specific Gravity  1.010 1.005 - 1.030    Blood, UA Negative Negative    pH, Urine 6.5 5.0 - 8.0    Protein, POC Negative Negative mg/dL    Urobilinogen, UA Normal Normal    Nitrite, UA Negative Negative    Leukocytes Negative Negative   US Renal Bilateral (07/16/2021 11:25)   CT Abdomen Pelvis With Contrast (07/30/2021 13:34)      The images for the above \"link(s)\" were made available to me to review independently.  I also reviewed the radiologist's report described above with regard to the urologic findings. My interpretation is as follows:  Left posterior interpolar small solid renal mass measuring 2.4 cm is noted on both renal US and contrasted CT. This is an enhancing lesion.   /Reinaldo Olivares MD               ASSESSMENT AND PLAN      Problem List Items Addressed This Visit     None      Visit Diagnoses     Left solid small renal mass    -  Primary    Relevant Orders    POC Urinalysis Dipstick, Multipro      It is explained to her that a female with a peripherally located kidney mass <3cm has only a 65%chance of having renal cell carcinoma and a less than 10% chance of harboring unfavorable pathology. The following options are discussed:  -Following lesion with serial radiographs: The natural hx of MOST contrast enhancing  small renal masses (SRM) is one of very slow progression. It is explained that a fairly recent retrospective meta-analysis of 234 small renal masses (SRM) demonstrate a growth rate of approximately 0.28cm/year on observation. Prospective active surveillance programs confirm slow growth rates of only 0.1cm/year with very low rates of metastatic progression (<1% in two to " three years) Diagnostic approach, differential diagnosis, and management of a small renal mass; Up to date, Literature review current up to July 2021. I did point out that in most of these series length of f/u was small at <30 months. I explained that if we follow this it is generally considered growth rate of >5mm/year is considered significant.   -Role of biopsy: Biopsy is generally felt to be safe with very low risk of clinically significant bleeding or seeding of the needle tract with malignant cells. The overall non-diagnostic rate is about 14% (90% of those will be malignant) and false positive rate is about 4%. 2/3 of patients explored with negative biopsy had benign pathology. Only 5% developed significant hematoma. Diagnostic accuracy and Risks of Biopsy in the Diagnosis of a renal mass suspicious for localized Renal Cell Carcinomas: Systematic review of the Literature, SALVATORE Koo,et al, Journal of Urology, 2016; 195(5):1340  -Thermal or Cryoablation of lesion- I explained this is a reasonable option but does increase the risk of recurrence. However for masses under 3 cm, the fiver year lesion recurrence free rate is about 94% with thermal ablation. The location of this mass would be amenable.   -Radical Nephrectomy -Excellent outcomes but higher risk of chronic kidney disease Stage III or worse.   -Partial Nephrectomy - This is probably the best option considering low recurrence rates, less decline in renal function, and acceptable complication rate. Complications of ureteral injury, urinary fistula, AV fistula, malformation or pseudoaneurysm with bleeding complications are discussed. WE also talked about intraoperative considerations leading to need for nephrectomy.     The patient has requested a referral to [place].  I do think this is a reasonable option because I am concerned with the posterior location of this mass I will not be able to do partial nephrectomy.  I do think Dr. Evans could offer her  a higher chance of nephron sparing surgery and potentially even discuss percutaneous ablation techniques.  I will arrange the referral.  Patient is told that she needs to be sure to get a copy of this study which was done at the [facility].    FOLLOW UP     No follow-ups on file.        (Please note that portions of this note were completed with a voice recognition program.)  Reinaldo Olivares MD  08/31/21  03:52 CDT

## 2021-08-31 ENCOUNTER — OFFICE VISIT (OUTPATIENT)
Dept: UROLOGY | Facility: CLINIC | Age: 61
End: 2021-08-31

## 2021-08-31 VITALS — TEMPERATURE: 98.4 F | WEIGHT: 150.2 LBS | BODY MASS INDEX: 26.61 KG/M2 | HEIGHT: 63 IN

## 2021-08-31 DIAGNOSIS — N28.89 LEFT RENAL MASS: Primary | ICD-10-CM

## 2021-08-31 LAB
BILIRUB BLD-MCNC: NEGATIVE MG/DL
CLARITY, POC: CLEAR
COLOR UR: YELLOW
GLUCOSE UR STRIP-MCNC: NEGATIVE MG/DL
KETONES UR QL: NEGATIVE
LEUKOCYTE EST, POC: ABNORMAL
NITRITE UR-MCNC: NEGATIVE MG/ML
PH UR: 7 [PH] (ref 5–8)
PROT UR STRIP-MCNC: NEGATIVE MG/DL
RBC # UR STRIP: ABNORMAL /UL
SP GR UR: 1.01 (ref 1–1.03)
UROBILINOGEN UR QL: NORMAL

## 2021-08-31 PROCEDURE — 99214 OFFICE O/P EST MOD 30 MIN: CPT | Performed by: UROLOGY

## 2021-08-31 PROCEDURE — 81003 URINALYSIS AUTO W/O SCOPE: CPT | Performed by: UROLOGY

## 2021-09-02 RX ORDER — LAMOTRIGINE 200 MG/1
TABLET ORAL
Qty: 90 TABLET | Refills: 1 | Status: SHIPPED | OUTPATIENT
Start: 2021-09-02 | End: 2022-02-28

## 2021-10-15 ENCOUNTER — OFFICE VISIT (OUTPATIENT)
Dept: FAMILY MEDICINE CLINIC | Facility: CLINIC | Age: 61
End: 2021-10-15

## 2021-10-15 VITALS
RESPIRATION RATE: 20 BRPM | OXYGEN SATURATION: 96 % | BODY MASS INDEX: 26.58 KG/M2 | SYSTOLIC BLOOD PRESSURE: 130 MMHG | HEART RATE: 89 BPM | WEIGHT: 150 LBS | DIASTOLIC BLOOD PRESSURE: 84 MMHG | HEIGHT: 63 IN | TEMPERATURE: 97.8 F

## 2021-10-15 DIAGNOSIS — I10 PRIMARY HYPERTENSION: Primary | ICD-10-CM

## 2021-10-15 DIAGNOSIS — J44.9 CHRONIC OBSTRUCTIVE PULMONARY DISEASE, UNSPECIFIED COPD TYPE (HCC): ICD-10-CM

## 2021-10-15 PROCEDURE — 99214 OFFICE O/P EST MOD 30 MIN: CPT | Performed by: NURSE PRACTITIONER

## 2021-10-15 NOTE — PROGRESS NOTES
Subjective   Ga Mendoza is a 60 y.o. female.     CC: Hypertension, COPD    Hypertension  This is a chronic problem. The current episode started more than 1 year ago. The problem is controlled. Pertinent negatives include no chest pain, headaches, palpitations or shortness of breath. Current antihypertension treatment includes beta blockers and ACE inhibitors. Compliance problems include diet and exercise.    COPD  There is no chest tightness, cough, difficulty breathing, shortness of breath, sputum production or wheezing. This is a chronic problem. The current episode started more than 1 year ago. The problem occurs intermittently. The problem has been waxing and waning. Pertinent negatives include no appetite change, chest pain, fever, headaches, myalgias, sore throat, trouble swallowing or weight loss. Her symptoms are aggravated by URI and minimal activity. Her symptoms are alleviated by rest and beta-agonist. She reports moderate improvement on treatment. Risk factors for lung disease include smoking/tobacco exposure. Her past medical history is significant for COPD.        The following portions of the patient's history were reviewed and updated as appropriate: allergies, current medications, past family history, past medical history, past social history, past surgical history and problem list.    Review of Systems   Constitutional: Negative for activity change, appetite change, chills, fatigue, fever, unexpected weight gain and unexpected weight loss.   HENT: Negative for congestion, sore throat, trouble swallowing and voice change.    Eyes: Negative.    Respiratory: Negative for cough, sputum production, chest tightness, shortness of breath and wheezing.    Cardiovascular: Negative for chest pain, palpitations and leg swelling.   Gastrointestinal: Negative for abdominal pain, constipation, diarrhea, nausea and vomiting.   Endocrine: Negative.    Genitourinary: Negative for dysuria.    Musculoskeletal: Negative for arthralgias and myalgias.   Skin: Negative for rash.   Allergic/Immunologic: Negative.    Neurological: Negative.    Hematological: Negative.    Psychiatric/Behavioral: Negative.        Objective   Physical Exam  Vitals and nursing note reviewed.   Constitutional:       General: She is not in acute distress.     Appearance: Normal appearance. She is well-developed and normal weight. She is not ill-appearing, toxic-appearing or diaphoretic.   HENT:      Head: Normocephalic and atraumatic.   Eyes:      Conjunctiva/sclera: Conjunctivae normal.   Cardiovascular:      Rate and Rhythm: Normal rate and regular rhythm.      Heart sounds: Normal heart sounds.   Pulmonary:      Effort: Pulmonary effort is normal. No respiratory distress.      Breath sounds: Normal breath sounds. No stridor. No wheezing, rhonchi or rales.   Musculoskeletal:         General: No tenderness. Normal range of motion.      Cervical back: Normal range of motion.   Skin:     General: Skin is warm and dry.      Coloration: Skin is not pale.      Findings: No erythema or rash.   Neurological:      Mental Status: She is alert and oriented to person, place, and time.   Psychiatric:         Mood and Affect: Mood normal.         Behavior: Behavior normal.         Thought Content: Thought content normal.         Judgment: Judgment normal.           Assessment/Plan   Diagnoses and all orders for this visit:    1. Primary hypertension (Primary)   -Controlled.  Continue lisinopril, Lopressor as prescribed.  We will continue to monitor.    2. Chronic obstructive pulmonary disease, unspecified COPD type (HCC)   -Stable with no increased cough, shortness of breath, sputum production or albuterol usage.  Continue albuterol as needed and Symbicort as prescribed.  We will continue to monitor.    3.  Follow-up in 6 months or sooner for any acute needs.          This document has been electronically signed by ROSEMARY Mccabe on  October 15, 2021 15:56 CDT

## 2021-11-02 PROCEDURE — 87635 SARS-COV-2 COVID-19 AMP PRB: CPT | Performed by: NURSE PRACTITIONER

## 2021-11-03 ENCOUNTER — TRANSCRIBE ORDERS (OUTPATIENT)
Dept: URGENT CARE | Facility: CLINIC | Age: 61
End: 2021-11-03

## 2021-11-03 DIAGNOSIS — R50.9 RESPIRATORY ILLNESS WITH FEVER: Primary | ICD-10-CM

## 2021-11-03 DIAGNOSIS — J98.9 RESPIRATORY ILLNESS WITH FEVER: Primary | ICD-10-CM

## 2021-11-03 RX ORDER — DOXYCYCLINE 100 MG/1
100 CAPSULE ORAL 2 TIMES DAILY
Qty: 20 CAPSULE | Refills: 0 | Status: SHIPPED | OUTPATIENT
Start: 2021-11-03 | End: 2022-10-01

## 2021-11-10 ENCOUNTER — LAB (OUTPATIENT)
Dept: ONCOLOGY | Facility: HOSPITAL | Age: 61
End: 2021-11-10

## 2021-11-10 DIAGNOSIS — Z12.2 ENCOUNTER FOR SCREENING FOR LUNG CANCER: ICD-10-CM

## 2021-11-10 DIAGNOSIS — E53.8 FOLATE DEFICIENCY: ICD-10-CM

## 2021-11-10 DIAGNOSIS — T45.4X5A ADVERSE EFFECT OF IRON, INITIAL ENCOUNTER: ICD-10-CM

## 2021-11-10 DIAGNOSIS — D50.0 IRON DEFICIENCY ANEMIA DUE TO CHRONIC BLOOD LOSS: ICD-10-CM

## 2021-11-10 LAB
ALBUMIN SERPL-MCNC: 4.3 G/DL (ref 3.5–5.2)
ALBUMIN/GLOB SERPL: 1.4 G/DL
ALP SERPL-CCNC: 108 U/L (ref 39–117)
ALT SERPL W P-5'-P-CCNC: 10 U/L (ref 1–33)
ANION GAP SERPL CALCULATED.3IONS-SCNC: 12 MMOL/L (ref 5–15)
AST SERPL-CCNC: 18 U/L (ref 1–32)
BASOPHILS # BLD AUTO: 0.09 10*3/MM3 (ref 0–0.2)
BASOPHILS NFR BLD AUTO: 0.8 % (ref 0–1.5)
BILIRUB SERPL-MCNC: 0.3 MG/DL (ref 0–1.2)
BUN SERPL-MCNC: 12 MG/DL (ref 8–23)
BUN/CREAT SERPL: 16.2 (ref 7–25)
CALCIUM SPEC-SCNC: 9.6 MG/DL (ref 8.6–10.5)
CHLORIDE SERPL-SCNC: 105 MMOL/L (ref 98–107)
CO2 SERPL-SCNC: 22 MMOL/L (ref 22–29)
CREAT SERPL-MCNC: 0.74 MG/DL (ref 0.57–1)
DEPRECATED RDW RBC AUTO: 42.9 FL (ref 37–54)
EOSINOPHIL # BLD AUTO: 0.09 10*3/MM3 (ref 0–0.4)
EOSINOPHIL NFR BLD AUTO: 0.8 % (ref 0.3–6.2)
ERYTHROCYTE [DISTWIDTH] IN BLOOD BY AUTOMATED COUNT: 13.2 % (ref 12.3–15.4)
FERRITIN SERPL-MCNC: 146.2 NG/ML (ref 13–150)
FOLATE SERPL-MCNC: 8.43 NG/ML (ref 4.78–24.2)
GFR SERPL CREATININE-BSD FRML MDRD: 80 ML/MIN/1.73
GLOBULIN UR ELPH-MCNC: 3 GM/DL
GLUCOSE SERPL-MCNC: 118 MG/DL (ref 65–99)
HCT VFR BLD AUTO: 42.5 % (ref 34–46.6)
HGB BLD-MCNC: 14.5 G/DL (ref 12–15.9)
IMM GRANULOCYTES # BLD AUTO: 0.09 10*3/MM3 (ref 0–0.05)
IMM GRANULOCYTES NFR BLD AUTO: 0.8 % (ref 0–0.5)
IRON 24H UR-MRATE: 74 MCG/DL (ref 37–145)
IRON SATN MFR SERPL: 21 % (ref 20–50)
LYMPHOCYTES # BLD AUTO: 2.68 10*3/MM3 (ref 0.7–3.1)
LYMPHOCYTES NFR BLD AUTO: 24.2 % (ref 19.6–45.3)
MCH RBC QN AUTO: 30.5 PG (ref 26.6–33)
MCHC RBC AUTO-ENTMCNC: 34.1 G/DL (ref 31.5–35.7)
MCV RBC AUTO: 89.5 FL (ref 79–97)
MONOCYTES # BLD AUTO: 0.65 10*3/MM3 (ref 0.1–0.9)
MONOCYTES NFR BLD AUTO: 5.9 % (ref 5–12)
NEUTROPHILS NFR BLD AUTO: 67.5 % (ref 42.7–76)
NEUTROPHILS NFR BLD AUTO: 7.49 10*3/MM3 (ref 1.7–7)
NRBC BLD AUTO-RTO: 0 /100 WBC (ref 0–0.2)
PLATELET # BLD AUTO: 340 10*3/MM3 (ref 140–450)
PMV BLD AUTO: 9.2 FL (ref 6–12)
POTASSIUM SERPL-SCNC: 4.3 MMOL/L (ref 3.5–5.2)
PROT SERPL-MCNC: 7.3 G/DL (ref 6–8.5)
RBC # BLD AUTO: 4.75 10*6/MM3 (ref 3.77–5.28)
SODIUM SERPL-SCNC: 139 MMOL/L (ref 136–145)
TIBC SERPL-MCNC: 346 MCG/DL (ref 298–536)
TRANSFERRIN SERPL-MCNC: 232 MG/DL (ref 200–360)
VIT B12 BLD-MCNC: 546 PG/ML (ref 211–946)
WBC # BLD AUTO: 11.09 10*3/MM3 (ref 3.4–10.8)

## 2021-11-10 PROCEDURE — 80053 COMPREHEN METABOLIC PANEL: CPT

## 2021-11-10 PROCEDURE — 36415 COLL VENOUS BLD VENIPUNCTURE: CPT | Performed by: INTERNAL MEDICINE

## 2021-11-10 PROCEDURE — 82728 ASSAY OF FERRITIN: CPT

## 2021-11-10 PROCEDURE — 85025 COMPLETE CBC W/AUTO DIFF WBC: CPT

## 2021-11-10 PROCEDURE — 82746 ASSAY OF FOLIC ACID SERUM: CPT

## 2021-11-10 PROCEDURE — 83540 ASSAY OF IRON: CPT

## 2021-11-10 PROCEDURE — 84466 ASSAY OF TRANSFERRIN: CPT

## 2021-11-10 PROCEDURE — 82607 VITAMIN B-12: CPT

## 2021-11-12 ENCOUNTER — OFFICE VISIT (OUTPATIENT)
Dept: ONCOLOGY | Facility: CLINIC | Age: 61
End: 2021-11-12

## 2021-11-12 VITALS
BODY MASS INDEX: 27.53 KG/M2 | DIASTOLIC BLOOD PRESSURE: 80 MMHG | HEART RATE: 81 BPM | SYSTOLIC BLOOD PRESSURE: 155 MMHG | OXYGEN SATURATION: 96 % | WEIGHT: 155.4 LBS | TEMPERATURE: 97.8 F

## 2021-11-12 DIAGNOSIS — E61.1 IRON DEFICIENCY: ICD-10-CM

## 2021-11-12 DIAGNOSIS — T45.4X5A ADVERSE EFFECT OF IRON, INITIAL ENCOUNTER: Primary | ICD-10-CM

## 2021-11-12 DIAGNOSIS — N28.89 RENAL MASS, LEFT: ICD-10-CM

## 2021-11-12 DIAGNOSIS — D50.0 IRON DEFICIENCY ANEMIA DUE TO CHRONIC BLOOD LOSS: Chronic | ICD-10-CM

## 2021-11-12 DIAGNOSIS — E53.8 FOLATE DEFICIENCY: ICD-10-CM

## 2021-11-12 PROBLEM — D69.6 THROMBOCYTOPENIA (HCC): Status: ACTIVE | Noted: 2021-11-12

## 2021-11-12 PROBLEM — D69.6 THROMBOCYTOPENIA (HCC): Status: RESOLVED | Noted: 2021-11-12 | Resolved: 2021-11-12

## 2021-11-12 PROCEDURE — 1125F AMNT PAIN NOTED PAIN PRSNT: CPT | Performed by: INTERNAL MEDICINE

## 2021-11-12 PROCEDURE — 1123F ACP DISCUSS/DSCN MKR DOCD: CPT | Performed by: INTERNAL MEDICINE

## 2021-11-12 PROCEDURE — 99214 OFFICE O/P EST MOD 30 MIN: CPT | Performed by: INTERNAL MEDICINE

## 2021-11-12 PROCEDURE — G0463 HOSPITAL OUTPT CLINIC VISIT: HCPCS | Performed by: INTERNAL MEDICINE

## 2021-11-12 RX ORDER — FOLIC ACID 1 MG/1
1 TABLET ORAL DAILY
Qty: 90 TABLET | Refills: 1 | Status: SHIPPED | OUTPATIENT
Start: 2021-11-12 | End: 2022-06-02 | Stop reason: SDUPTHER

## 2021-11-12 NOTE — PROGRESS NOTES
DATE OF VISIT: 11/12/2021      REASON FOR VISIT: Iron deficiency anemia, screening for lung cancer, fatigue, kidney mass      HISTORY OF PRESENT ILLNESS:   61-year-old female with medical problem consisting of hypertension, dyslipidemia, anxiety, bipolar disorder, questionable history of leukemia was initially seen in consultation on 9/7/2021 for iron deficiency anemia and fatigue.  Routine screening for lung cancer patient was found to have kidney mass for which patient had a CT scan done and subsequently referred to urologist.  Patient states he is scheduled to get nephrectomy done at [place] next week.  Complains of fatigue.  Denies any bleeding.  Denies any new lymph node enlargement.  Complains of chronic shortness of breath.  Complains of chronic arthritis.        Past Medical History, Past Surgical History, Social History, Family History have been reviewed and are without significant changes except as mentioned.    Review of Systems   A comprehensive 14 point review of systems was performed and was negative except as mentioned in HPI.    Medications:  The current medication list was reviewed in the EMR    ALLERGIES:  No Known Allergies    Objective      Vitals:    11/12/21 1306   BP: 155/80   Pulse: 81   Temp: 97.8 °F (36.6 °C)   SpO2: 96%   Weight: 70.5 kg (155 lb 6.4 oz)   PainSc:   5   PainLoc: Foot  Comment: BILATERAL FOOT PAIN     Current Status 7/9/2021   ECOG score 0       Physical Exam  Pulmonary:      Breath sounds: Normal breath sounds.   Neurological:      Mental Status: She is alert and oriented to person, place, and time.           RECENT LABS:  Glucose   Date Value Ref Range Status   11/10/2021 118 (H) 65 - 99 mg/dL Final     Sodium   Date Value Ref Range Status   11/10/2021 139 136 - 145 mmol/L Final     Potassium   Date Value Ref Range Status   11/10/2021 4.3 3.5 - 5.2 mmol/L Final     CO2   Date Value Ref Range Status   11/10/2021 22.0 22.0 - 29.0 mmol/L Final     Chloride   Date Value Ref  Range Status   11/10/2021 105 98 - 107 mmol/L Final     Anion Gap   Date Value Ref Range Status   11/10/2021 12.0 5.0 - 15.0 mmol/L Final     Creatinine   Date Value Ref Range Status   11/10/2021 0.74 0.57 - 1.00 mg/dL Final     BUN   Date Value Ref Range Status   11/10/2021 12 8 - 23 mg/dL Final     BUN/Creatinine Ratio   Date Value Ref Range Status   11/10/2021 16.2 7.0 - 25.0 Final     Calcium   Date Value Ref Range Status   11/10/2021 9.6 8.6 - 10.5 mg/dL Final     eGFR Non  Amer   Date Value Ref Range Status   11/10/2021 80 >60 mL/min/1.73 Final     Alkaline Phosphatase   Date Value Ref Range Status   11/10/2021 108 39 - 117 U/L Final     Total Protein   Date Value Ref Range Status   11/10/2021 7.3 6.0 - 8.5 g/dL Final     ALT (SGPT)   Date Value Ref Range Status   11/10/2021 10 1 - 33 U/L Final     AST (SGOT)   Date Value Ref Range Status   11/10/2021 18 1 - 32 U/L Final     Total Bilirubin   Date Value Ref Range Status   11/10/2021 0.3 0.0 - 1.2 mg/dL Final     Albumin   Date Value Ref Range Status   11/10/2021 4.30 3.50 - 5.20 g/dL Final     Globulin   Date Value Ref Range Status   11/10/2021 3.0 gm/dL Final     Lab Results   Component Value Date    WBC 11.09 (H) 11/10/2021    HGB 14.5 11/10/2021    HCT 42.5 11/10/2021    MCV 89.5 11/10/2021     11/10/2021     Lab Results   Component Value Date    NEUTROABS 7.49 (H) 11/10/2021    IRON 74 11/10/2021    IRON 99 07/07/2021    IRON 27 (L) 05/07/2021    TIBC 346 11/10/2021    TIBC 343 07/07/2021    TIBC 519 05/07/2021    LABIRON 21 11/10/2021    LABIRON 29 07/07/2021    LABIRON 5 (L) 05/07/2021    FERRITIN 146.20 11/10/2021    FERRITIN 191.90 (H) 07/07/2021    FERRITIN 12.40 (L) 05/07/2021    [id number] 546 11/10/2021    [id number] 941 07/07/2021    YECTNSJQ17 258 05/07/2021    FOLATE 8.43 11/10/2021    FOLATE >20.00 07/07/2021    FOLATE 3.58 (L) 05/07/2021     No results found for: , LABCA2, AFPTM, HCGQUANT, , CHROMGRNA, [id number], CEA,  REFLABREPO      PATHOLOGY:  * Cannot find OR log *         RADIOLOGY DATA :  CT Angiogram of abdomen with and without contrast done on October 6, 2021 showed:    FINDINGS:     VASCULATURE: Heart size is within normal limits. No pericardial effusion. Visualized descending thoracic aorta demonstrates marginal calcified and soft plaque along its course causing mild luminal irregularity but maintains normal caliber into the   abdomen. Normal caliber abdominal aorta with marginal atherosclerotic calcified plaque predominantly along the infrarenal aorta.     The celiac artery supplies the splenic and left gastric arteries which are widely patent. There is calcified plaque at the origin of the superior mesenteric artery causing no significant stenosis. The superior mesenteric artery supplies the proper   hepatic artery as well as mesenteric branches which are widely patent. The inferior mesenteric artery is patent. There are single bilateral renal arteries which demonstrate mild marginal calcified plaque at their origins and proximal segment causing only    minimal stenosis. There is widely patent runoff bilaterally to the renal jens. Additional dedicated arterial images of the left kidney better understand shock in Sect[place].     There are single bilateral renal veins are patent to the mid cava. The left renal vein is in normal fashion extending anterior to the aorta to the mid cava.     LUNG BASES: Linear scarring or atelectasis within the lingula. Underlying emphysema.     ABDOMEN:     Liver: Unchanged areas of low density within the inferior aspect of the liver too small to characterize (best seen on axial 2.5 mm venous, image 73). Small amount of low density along the falciform ligament likely related to focal fat.     Gallbladder: Absent.     Spleen: Within normal limits.     Pancreas: Atrophy of the pancreas.     Adrenal glands: Within normal limits.     Kidneys: No evidence of hydronephrosis. The known left renal mass  is again identified measuring 23 x 23 mm of the upper pole of the left kidney. The kidneys otherwise enhance symmetrically.     GASTROINTESTINAL TRACT: Within normal limits.     Peritoneum/Omentum: Within normal limits.       BONE WINDOWS: Within normal limits       IMPRESSION:       1. Again seen is the known left renal mass in the upper pole of the left kidney which has not significantly changed in size when comparing to the 7/30/2021 outside CT.   2. Single bilateral renal arteries and veins identified as described above.   3. There is mild atherosclerotic plaque without hemodynamically significant stenosis, aneurysm, or dissection of the abdominal arterial structures.   4. Small amount focal fat along the falciform ligament. Small low densities in the inferior aspect of the right lobe of the liver too small to characterize.          No radiology results for the last 7 days        Assessment/Plan     1.  Iron deficiency:  -Due to iron deficiency with iron saturation of 5% and ferritin of 12 patient received 5 dose of Venofer in May 2021  -Patient was not able to tolerate iron by mouth.  -Anemia work-up done on November 10, 2021 shows hemoglobin is 14.5.  Iron studies are adequate no need for any Venofer at present  -Patient had EGD and colonoscopy done on Erin 15, 2021 that did not show any evidence of any active bleeding.  -Recommend B12 and folic acid p.o. daily  -We will ask patient to return to clinic in 5 weeks with repeat CBC and CMP on that day    2.  Screening for lung cancer:  -Patient is 45-pack-year smoking history  -Next low-dose CT of chest will be due in July 2022    3.  Folate deficiency  -Folate level is decreased to 8.4.  Recommend  folic acid 1 mg p.o. daily    4.  Left kidney mass  -Ultrasound as well as CT scan done in July 2021 was consistent with kidney mass.  Subsequently patient has been evaluated by urology team.  He is scheduled to undergo nephrectomy next week at [place]    5.   Health maintenance: Unfortunately patient continues to smoke.  Was counseled about smoking cessation    6. Advance Care Planning: For now patient remains full code and is able to make decisions.  Patient has health care surrogate mentioned on chart.    7.  Prescriptions:  -Prescription for folic acid has been sent to her pharmacy today             PHQ-9 Total Score: 0   -Patient is not homicidal or suicidal.  No acute intervention required.    Ga Mendoza reports a pain score of 5.  Given her pain assessment as noted, treatment options were discussed and the following options were decided upon as a follow-up plan to address the patient's pain: continuation of current treatment plan for pain.         Po Park MD  11/12/2021  13:24 CST        Part of this note may be an electronic transcription/translation of spoken language to printed text using the Dragon Dictation System.          CC:

## 2021-11-13 PROCEDURE — 87635 SARS-COV-2 COVID-19 AMP PRB: CPT | Performed by: NURSE PRACTITIONER

## 2021-12-17 ENCOUNTER — LAB (OUTPATIENT)
Dept: ONCOLOGY | Facility: HOSPITAL | Age: 61
End: 2021-12-17

## 2021-12-17 ENCOUNTER — OFFICE VISIT (OUTPATIENT)
Dept: ONCOLOGY | Facility: CLINIC | Age: 61
End: 2021-12-17

## 2021-12-17 VITALS
WEIGHT: 147.3 LBS | SYSTOLIC BLOOD PRESSURE: 142 MMHG | DIASTOLIC BLOOD PRESSURE: 82 MMHG | BODY MASS INDEX: 26.09 KG/M2 | HEART RATE: 84 BPM | OXYGEN SATURATION: 98 %

## 2021-12-17 DIAGNOSIS — Z12.2 ENCOUNTER FOR SCREENING FOR LUNG CANCER: Chronic | ICD-10-CM

## 2021-12-17 DIAGNOSIS — N28.89 RENAL MASS, LEFT: ICD-10-CM

## 2021-12-17 DIAGNOSIS — T45.4X5A ADVERSE EFFECT OF IRON, INITIAL ENCOUNTER: ICD-10-CM

## 2021-12-17 DIAGNOSIS — D50.0 IRON DEFICIENCY ANEMIA DUE TO CHRONIC BLOOD LOSS: ICD-10-CM

## 2021-12-17 DIAGNOSIS — E53.8 FOLATE DEFICIENCY: ICD-10-CM

## 2021-12-17 DIAGNOSIS — T45.4X5A ADVERSE EFFECT OF IRON, INITIAL ENCOUNTER: Chronic | ICD-10-CM

## 2021-12-17 DIAGNOSIS — E53.8 FOLATE DEFICIENCY: Chronic | ICD-10-CM

## 2021-12-17 DIAGNOSIS — D50.0 IRON DEFICIENCY ANEMIA DUE TO CHRONIC BLOOD LOSS: Primary | Chronic | ICD-10-CM

## 2021-12-17 DIAGNOSIS — E61.1 IRON DEFICIENCY: ICD-10-CM

## 2021-12-17 DIAGNOSIS — C64.2 MALIGNANT NEOPLASM OF LEFT KIDNEY (HCC): ICD-10-CM

## 2021-12-17 PROBLEM — C64.1 MALIGNANT NEOPLASM OF RIGHT KIDNEY: Status: ACTIVE | Noted: 2021-12-17

## 2021-12-17 LAB
BASOPHILS # BLD AUTO: 0.08 10*3/MM3 (ref 0–0.2)
BASOPHILS NFR BLD AUTO: 0.8 % (ref 0–1.5)
DEPRECATED RDW RBC AUTO: 45.1 FL (ref 37–54)
EOSINOPHIL # BLD AUTO: 0.14 10*3/MM3 (ref 0–0.4)
EOSINOPHIL NFR BLD AUTO: 1.3 % (ref 0.3–6.2)
ERYTHROCYTE [DISTWIDTH] IN BLOOD BY AUTOMATED COUNT: 13.5 % (ref 12.3–15.4)
HCT VFR BLD AUTO: 43 % (ref 34–46.6)
HGB BLD-MCNC: 14.2 G/DL (ref 12–15.9)
IMM GRANULOCYTES # BLD AUTO: 0.07 10*3/MM3 (ref 0–0.05)
IMM GRANULOCYTES NFR BLD AUTO: 0.7 % (ref 0–0.5)
LYMPHOCYTES # BLD AUTO: 3.14 10*3/MM3 (ref 0.7–3.1)
LYMPHOCYTES NFR BLD AUTO: 29.6 % (ref 19.6–45.3)
MCH RBC QN AUTO: 30.1 PG (ref 26.6–33)
MCHC RBC AUTO-ENTMCNC: 33 G/DL (ref 31.5–35.7)
MCV RBC AUTO: 91.3 FL (ref 79–97)
MONOCYTES # BLD AUTO: 0.61 10*3/MM3 (ref 0.1–0.9)
MONOCYTES NFR BLD AUTO: 5.8 % (ref 5–12)
NEUTROPHILS NFR BLD AUTO: 6.56 10*3/MM3 (ref 1.7–7)
NEUTROPHILS NFR BLD AUTO: 61.8 % (ref 42.7–76)
NRBC BLD AUTO-RTO: 0 /100 WBC (ref 0–0.2)
PLATELET # BLD AUTO: 298 10*3/MM3 (ref 140–450)
PMV BLD AUTO: 9.6 FL (ref 6–12)
RBC # BLD AUTO: 4.71 10*6/MM3 (ref 3.77–5.28)
WBC NRBC COR # BLD: 10.6 10*3/MM3 (ref 3.4–10.8)

## 2021-12-17 PROCEDURE — 85025 COMPLETE CBC W/AUTO DIFF WBC: CPT

## 2021-12-17 PROCEDURE — G0463 HOSPITAL OUTPT CLINIC VISIT: HCPCS | Performed by: INTERNAL MEDICINE

## 2021-12-17 PROCEDURE — 99214 OFFICE O/P EST MOD 30 MIN: CPT | Performed by: INTERNAL MEDICINE

## 2021-12-17 NOTE — PROGRESS NOTES
DATE OF VISIT: 12/17/2021      REASON FOR VISIT: Iron deficiency anemia, screening for lung cancer, fatigue, right kidney cancer stage I      HISTORY OF PRESENT ILLNESS:   61-year-old female with medical problem consisting of hypertension, dyslipidemia, anxiety, bipolar disorder, was initially seen in consultation on September 7, 2021 for iron deficiency anemia and fatigue.  Patient was found to have a kidney mass on routine low-dose CT of chest.  Subsequently patient underwent nephrectomy at [facility] on November 16, 2021.  Patient is here to discuss the results and further recommendation.  Complains of fatigue.  Denies any new lymph node enlargement.  Complains of chronic shortness of breath.  Complains of arthralgias.          Past Medical History, Past Surgical History, Social History, Family History have been reviewed and are without significant changes except as mentioned.    Review of Systems   A comprehensive 14 point review of systems was performed and was negative except as mentioned in HPI.    Medications:  The current medication list was reviewed in the EMR    ALLERGIES:  No Known Allergies    Objective      Vitals:    12/17/21 1329   BP: 142/82   Pulse: 84   SpO2: 98%   Weight: 66.8 kg (147 lb 4.8 oz)   PainSc:   4   PainLoc: Foot  Comment: BILATERAL FOOT PAIN/NEUROPATHY     Current Status 7/9/2021   ECOG score 0       Physical Exam  Pulmonary:      Breath sounds: Normal breath sounds.   Neurological:      Mental Status: She is alert and oriented to person, place, and time.           RECENT LABS:  Glucose   Date Value Ref Range Status   11/10/2021 118 (H) 65 - 99 mg/dL Final     Sodium   Date Value Ref Range Status   11/17/2021 138 136 - 145 mmol/L Final     Potassium   Date Value Ref Range Status   11/17/2021 4.6 3.3 - 4.8 mmol/L Final     Comment:     Serum reference ranges are shown, please note that plasma samples are 0.3 - 0.5 g/dL higher than serum.     Total CO2   Date Value Ref Range Status    11/17/2021 21 (L) 23 - 31 mmol/L Final     Chloride   Date Value Ref Range Status   11/17/2021 106 98 - 107 mmol/L Final     Anion Gap   Date Value Ref Range Status   11/17/2021 11  Final     Creatinine   Date Value Ref Range Status   11/17/2021 0.82 0.57 - 1.11 mg/dL Final     BUN   Date Value Ref Range Status   11/17/2021 10 8 - 26 mg/dL Final     BUN/Creatinine Ratio   Date Value Ref Range Status   11/10/2021 16.2 7.0 - 25.0 Final     Calcium   Date Value Ref Range Status   11/17/2021 9.4 8.4 - 10.5 mg/dL Final     eGFR Non  Amer   Date Value Ref Range Status   11/10/2021 80 >60 mL/min/1.73 Final     Alkaline Phosphatase   Date Value Ref Range Status   11/10/2021 108 39 - 117 U/L Final     Total Protein   Date Value Ref Range Status   11/10/2021 7.3 6.0 - 8.5 g/dL Final     ALT (SGPT)   Date Value Ref Range Status   11/10/2021 10 1 - 33 U/L Final     AST (SGOT)   Date Value Ref Range Status   11/10/2021 18 1 - 32 U/L Final     Total Bilirubin   Date Value Ref Range Status   11/10/2021 0.3 0.0 - 1.2 mg/dL Final     Albumin   Date Value Ref Range Status   11/10/2021 4.30 3.50 - 5.20 g/dL Final     Globulin   Date Value Ref Range Status   11/10/2021 3.0 gm/dL Final     Lab Results   Component Value Date    WBC 10.60 12/17/2021    HGB 14.2 12/17/2021    HCT 43.0 12/17/2021    MCV 91.3 12/17/2021     12/17/2021     Lab Results   Component Value Date    NEUTROABS 6.56 12/17/2021    IRON 74 11/10/2021    IRON 99 07/07/2021    IRON 27 (L) 05/07/2021    TIBC 346 11/10/2021    TIBC 343 07/07/2021    TIBC 519 05/07/2021    LABIRON 21 11/10/2021    LABIRON 29 07/07/2021    LABIRON 5 (L) 05/07/2021    FERRITIN 146.20 11/10/2021    FERRITIN 191.90 (H) 07/07/2021    FERRITIN 12.40 (L) 05/07/2021    [id number] 546 11/10/2021    [id number] 941 07/07/2021    [id number] 258 05/07/2021    FOLATE 8.43 11/10/2021    FOLATE >20.00 07/07/2021    FOLATE 3.58 (L) 05/07/2021     No results found for: , LABCA2, AFPTM,  HCGQUANT, , CHROMGRNA, [id number], CEA, REFLABREPO      PATHOLOGY:  Pathology report from November 16, 2021 showed:    Synoptic Diagnosis   2)  LEFT RENAL MASS:     SPECIMEN   Procedure:  Partial nephrectomy   Specimen Laterality:  Left   TUMOR   Histologic Type:  Multilocular cystic clear cell renal cell neoplasm of low malignant potential   Histologic Grade:  G1: Nucleoli absent or inconspicuous and basophilic at 400x magnification   Tumor Size:  Greatest Dimension (Centimeters): 2.4 cm   Tumor Focality:  Unifocal   Tumor Extension:  Tumor limited to kidney   Sarcomatoid Features:  Not identified   Rhabdoid Features:  Not identified   Tumor Necrosis:  Not identified   Lymphovascular Invasion:  Not identified   MARGINS   Margins:  Uninvolved by invasive carcinoma   LYMPH NODES   Regional Lymph Nodes:  No lymph nodes submitted or found   PATHOLOGIC STAGE CLASSIFICATION (pTNM, AJCC 8th Edition)   TNM Descriptors:  Not applicable   Primary Tumor (pT):  pT1a   Regional Lymph Nodes (pN):  pNX   Distant Metastasis (pM):  Not applicable - pM cannot be determined from the submitted specimen(s)   ADDITIONAL FINDINGS   Pathologic Findings in Nonneoplastic Kidney:  None identified   Block Selection: The optimal block for possible ancillary studies is: 2A   --------------------------------------------------------             RADIOLOGY DATA :  No radiology results for the last 7 days        Assessment/Plan     1.  Clear cell cancer of left kidney, stage I, [id number]:  -Patient had a partial nephrectomy done on November 16, 2021 at [facility].  Pathology report was obtained and reviewed and discussed with patient.  -Patient has negative surgical margin.  There is no lymphovascular or perineural invasion seen on pathology report.  -Recommend continue with clinical surveillance.  -We will repeat CT of abdomen and pelvis around June 2022 for surveillance purposes after that patient will need CT scan once a year for at least 3  years which was discussed with patient    2.  Iron deficiency:  -Due to iron deficiency and inability to tolerate iron by mouth, patient received Venofer in May 2021  -Hemoglobin today's 14.2  -Recommend continue with B12 and folic acid p.o. daily  -We will repeat anemia work-up upon next clinic visit in 3 months    3.  Folate deficiency:  -Recommend continue with folic acid p.o. daily    4.  Screening for lung cancer:  -Patient is 45-pack-year smoking history  -Next Low-dose CT of chest without contrast will be due in July 2022    5. Advance Care Planning: For now patient remains full code and is able to make decisions.  Patient has health care surrogate mentioned on chart.                       PHQ-9 Total Score: 1   -Patient is not homicidal or suicidal.  No acute intervention required.    Ga Dubois Reji reports a pain score of 4.  Given her pain assessment as noted, treatment options were discussed and the following options were decided upon as a follow-up plan to address the patient's pain: continuation of current treatment plan for pain.         Po Park MD  12/17/2021  13:43 CST        Part of this note may be an electronic transcription/translation of spoken language to printed text using the Dragon Dictation System.          CC:

## 2021-12-29 RX ORDER — HYDROXYZINE PAMOATE 25 MG/1
CAPSULE ORAL
Qty: 90 CAPSULE | Refills: 0 | Status: SHIPPED | OUTPATIENT
Start: 2021-12-29 | End: 2022-03-25

## 2022-01-14 RX ORDER — CYCLOBENZAPRINE HCL 10 MG
TABLET ORAL
Qty: 90 TABLET | Refills: 5 | Status: SHIPPED | OUTPATIENT
Start: 2022-01-14 | End: 2022-06-20 | Stop reason: SDUPTHER

## 2022-02-01 ENCOUNTER — TRANSCRIBE ORDERS (OUTPATIENT)
Dept: CT IMAGING | Facility: HOSPITAL | Age: 62
End: 2022-02-01

## 2022-02-01 DIAGNOSIS — N28.89 KIDNEY MASS: Primary | ICD-10-CM

## 2022-02-02 ENCOUNTER — HOSPITAL ENCOUNTER (OUTPATIENT)
Dept: CT IMAGING | Facility: HOSPITAL | Age: 62
Discharge: HOME | End: 2022-02-02

## 2022-02-02 ENCOUNTER — LAB (OUTPATIENT)
Dept: LAB | Facility: HOSPITAL | Age: 62
End: 2022-02-02

## 2022-02-02 DIAGNOSIS — N28.89 KIDNEY MASS: ICD-10-CM

## 2022-02-02 LAB
BUN SERPL-MCNC: 12 MG/DL (ref 8–23)
CREAT SERPL-MCNC: 0.82 MG/DL (ref 0.57–1)
GFR SERPL CREATININE-BSD FRML MDRD: 71 ML/MIN/1.73

## 2022-02-02 PROCEDURE — 36415 COLL VENOUS BLD VENIPUNCTURE: CPT

## 2022-02-02 PROCEDURE — 25010000002 IOPAMIDOL 61 % SOLUTION: Performed by: UROLOGY

## 2022-02-02 PROCEDURE — 74170 CT ABD WO CNTRST FLWD CNTRST: CPT

## 2022-02-02 PROCEDURE — 82565 ASSAY OF CREATININE: CPT

## 2022-02-02 PROCEDURE — 84520 ASSAY OF UREA NITROGEN: CPT

## 2022-02-02 RX ADMIN — IOPAMIDOL 90 ML: 612 INJECTION, SOLUTION INTRAVENOUS at 12:01

## 2022-02-28 RX ORDER — AMITRIPTYLINE HYDROCHLORIDE 75 MG/1
TABLET, FILM COATED ORAL
Qty: 90 TABLET | Refills: 2 | Status: SHIPPED | OUTPATIENT
Start: 2022-02-28 | End: 2022-11-03

## 2022-02-28 RX ORDER — LAMOTRIGINE 200 MG/1
TABLET ORAL
Qty: 90 TABLET | Refills: 1 | Status: SHIPPED | OUTPATIENT
Start: 2022-02-28 | End: 2022-08-10

## 2022-02-28 RX ORDER — TOPIRAMATE 50 MG/1
TABLET, FILM COATED ORAL
Qty: 90 TABLET | Refills: 2 | Status: SHIPPED | OUTPATIENT
Start: 2022-02-28 | End: 2022-11-03

## 2022-03-07 RX ORDER — ROPINIROLE 0.25 MG/1
TABLET, FILM COATED ORAL
Qty: 90 TABLET | Refills: 3 | Status: SHIPPED | OUTPATIENT
Start: 2022-03-07 | End: 2023-02-27

## 2022-03-18 ENCOUNTER — OFFICE VISIT (OUTPATIENT)
Dept: ONCOLOGY | Facility: CLINIC | Age: 62
End: 2022-03-18

## 2022-03-18 ENCOUNTER — LAB (OUTPATIENT)
Dept: ONCOLOGY | Facility: HOSPITAL | Age: 62
End: 2022-03-18

## 2022-03-18 VITALS
OXYGEN SATURATION: 99 % | DIASTOLIC BLOOD PRESSURE: 61 MMHG | BODY MASS INDEX: 26.13 KG/M2 | TEMPERATURE: 97 F | SYSTOLIC BLOOD PRESSURE: 120 MMHG | HEART RATE: 80 BPM | WEIGHT: 147.5 LBS

## 2022-03-18 DIAGNOSIS — T45.4X5A ADVERSE EFFECT OF IRON, INITIAL ENCOUNTER: Chronic | ICD-10-CM

## 2022-03-18 DIAGNOSIS — D50.0 IRON DEFICIENCY ANEMIA DUE TO CHRONIC BLOOD LOSS: ICD-10-CM

## 2022-03-18 DIAGNOSIS — E53.8 FOLATE DEFICIENCY: Chronic | ICD-10-CM

## 2022-03-18 DIAGNOSIS — T45.4X5A ADVERSE EFFECT OF IRON, INITIAL ENCOUNTER: ICD-10-CM

## 2022-03-18 DIAGNOSIS — C64.2 MALIGNANT NEOPLASM OF LEFT KIDNEY: Primary | Chronic | ICD-10-CM

## 2022-03-18 DIAGNOSIS — E53.8 FOLATE DEFICIENCY: ICD-10-CM

## 2022-03-18 DIAGNOSIS — Z12.2 ENCOUNTER FOR SCREENING FOR LUNG CANCER: ICD-10-CM

## 2022-03-18 DIAGNOSIS — D50.0 IRON DEFICIENCY ANEMIA DUE TO CHRONIC BLOOD LOSS: Chronic | ICD-10-CM

## 2022-03-18 LAB
ALBUMIN SERPL-MCNC: 4.7 G/DL (ref 3.5–5.2)
ALBUMIN/GLOB SERPL: 2 G/DL
ALP SERPL-CCNC: 109 U/L (ref 39–117)
ALT SERPL W P-5'-P-CCNC: 8 U/L (ref 1–33)
ANION GAP SERPL CALCULATED.3IONS-SCNC: 12 MMOL/L (ref 5–15)
AST SERPL-CCNC: 13 U/L (ref 1–32)
BASOPHILS # BLD AUTO: 0.11 10*3/MM3 (ref 0–0.2)
BASOPHILS NFR BLD AUTO: 1 % (ref 0–1.5)
BILIRUB SERPL-MCNC: 0.2 MG/DL (ref 0–1.2)
BUN SERPL-MCNC: 10 MG/DL (ref 8–23)
BUN/CREAT SERPL: 13.7 (ref 7–25)
CALCIUM SPEC-SCNC: 9.7 MG/DL (ref 8.6–10.5)
CHLORIDE SERPL-SCNC: 105 MMOL/L (ref 98–107)
CO2 SERPL-SCNC: 25 MMOL/L (ref 22–29)
CREAT SERPL-MCNC: 0.73 MG/DL (ref 0.57–1)
DEPRECATED RDW RBC AUTO: 44.7 FL (ref 37–54)
EGFRCR SERPLBLD CKD-EPI 2021: 94.3 ML/MIN/1.73
EOSINOPHIL # BLD AUTO: 0.12 10*3/MM3 (ref 0–0.4)
EOSINOPHIL NFR BLD AUTO: 1.1 % (ref 0.3–6.2)
ERYTHROCYTE [DISTWIDTH] IN BLOOD BY AUTOMATED COUNT: 13.7 % (ref 12.3–15.4)
FERRITIN SERPL-MCNC: 30.86 NG/ML (ref 13–150)
FOLATE SERPL-MCNC: >20 NG/ML (ref 4.78–24.2)
GLOBULIN UR ELPH-MCNC: 2.4 GM/DL
GLUCOSE SERPL-MCNC: 102 MG/DL (ref 65–99)
HCT VFR BLD AUTO: 42.5 % (ref 34–46.6)
HGB BLD-MCNC: 14.3 G/DL (ref 12–15.9)
HOLD SPECIMEN: NORMAL
IMM GRANULOCYTES # BLD AUTO: 0.06 10*3/MM3 (ref 0–0.05)
IMM GRANULOCYTES NFR BLD AUTO: 0.6 % (ref 0–0.5)
IRON 24H UR-MRATE: 44 MCG/DL (ref 37–145)
IRON SATN MFR SERPL: 11 % (ref 20–50)
LYMPHOCYTES # BLD AUTO: 2.94 10*3/MM3 (ref 0.7–3.1)
LYMPHOCYTES NFR BLD AUTO: 28 % (ref 19.6–45.3)
MCH RBC QN AUTO: 30 PG (ref 26.6–33)
MCHC RBC AUTO-ENTMCNC: 33.6 G/DL (ref 31.5–35.7)
MCV RBC AUTO: 89.3 FL (ref 79–97)
MONOCYTES # BLD AUTO: 0.58 10*3/MM3 (ref 0.1–0.9)
MONOCYTES NFR BLD AUTO: 5.5 % (ref 5–12)
NEUTROPHILS NFR BLD AUTO: 6.68 10*3/MM3 (ref 1.7–7)
NEUTROPHILS NFR BLD AUTO: 63.8 % (ref 42.7–76)
NRBC BLD AUTO-RTO: 0 /100 WBC (ref 0–0.2)
PLATELET # BLD AUTO: 316 10*3/MM3 (ref 140–450)
PMV BLD AUTO: 9.4 FL (ref 6–12)
POTASSIUM SERPL-SCNC: 3.8 MMOL/L (ref 3.5–5.2)
PROT SERPL-MCNC: 7.1 G/DL (ref 6–8.5)
RBC # BLD AUTO: 4.76 10*6/MM3 (ref 3.77–5.28)
SODIUM SERPL-SCNC: 142 MMOL/L (ref 136–145)
TIBC SERPL-MCNC: 407 MCG/DL (ref 298–536)
TRANSFERRIN SERPL-MCNC: 273 MG/DL (ref 200–360)
VIT B12 BLD-MCNC: 926 PG/ML (ref 211–946)
WBC NRBC COR # BLD: 10.49 10*3/MM3 (ref 3.4–10.8)

## 2022-03-18 PROCEDURE — 80053 COMPREHEN METABOLIC PANEL: CPT

## 2022-03-18 PROCEDURE — 99214 OFFICE O/P EST MOD 30 MIN: CPT | Performed by: INTERNAL MEDICINE

## 2022-03-18 PROCEDURE — 84466 ASSAY OF TRANSFERRIN: CPT

## 2022-03-18 PROCEDURE — 82607 VITAMIN B-12: CPT

## 2022-03-18 PROCEDURE — 82728 ASSAY OF FERRITIN: CPT

## 2022-03-18 PROCEDURE — 85025 COMPLETE CBC W/AUTO DIFF WBC: CPT

## 2022-03-18 PROCEDURE — G0463 HOSPITAL OUTPT CLINIC VISIT: HCPCS | Performed by: INTERNAL MEDICINE

## 2022-03-18 PROCEDURE — 83540 ASSAY OF IRON: CPT

## 2022-03-18 PROCEDURE — 82746 ASSAY OF FOLIC ACID SERUM: CPT

## 2022-03-18 NOTE — PROGRESS NOTES
DATE OF VISIT: 3/19/2022      REASON FOR VISIT: Iron deficiency anemia, screening for lung cancer, stage I right kidney cancer, fatigue      HISTORY OF PRESENT ILLNESS:   61-year-old female with medical problem consisting of hypertension, dyslipidemia, anxiety, bipolar disorder who was initially seen in consultation on September 7, 2021 for iron deficiency anemia, folate deficiency and fatigue.  Subsequently patient was diagnosed with stage I renal cell cancer and had a nephrectomy in November 2021 at [facility].  Patient is here for 3-month follow-up appointment today.  Complains of fatigue.  Denies any bleeding.  Denies any new lymph node enlargement.  Complains of chronic shortness of breath.  Complains of arthralgias.          Past Medical History, Past Surgical History, Social History, Family History have been reviewed and are without significant changes except as mentioned.    Review of Systems   A comprehensive 14 point review of systems was performed and was negative except as mentioned in HPI.    Medications:  The current medication list was reviewed in the EMR    ALLERGIES:  No Known Allergies    Objective      Vitals:    03/18/22 1306   BP: 120/61   Pulse: 80   Temp: 97 °F (36.1 °C)   TempSrc: Temporal   SpO2: 99%   Weight: 66.9 kg (147 lb 8 oz)   PainSc:   5   PainLoc: Foot  Comment: FEET NEUROPATHY     Current Status 7/9/2021   ECOG score 0       Physical Exam  Pulmonary:      Breath sounds: Normal breath sounds.   Neurological:      Mental Status: She is alert and oriented to person, place, and time.           RECENT LABS:  Glucose   Date Value Ref Range Status   03/18/2022 102 (H) 65 - 99 mg/dL Final     Sodium   Date Value Ref Range Status   03/18/2022 142 136 - 145 mmol/L Final   12/30/2021 144 136 - 145 mmol/L Final     Potassium   Date Value Ref Range Status   03/18/2022 3.8 3.5 - 5.2 mmol/L Final   12/30/2021 4.7 3.3 - 4.8 mmol/L Final     Comment:     Plasma reference ranges are shown, please  note that serum reference ranges are higher than plasma.     CO2   Date Value Ref Range Status   03/18/2022 25.0 22.0 - 29.0 mmol/L Final     Total CO2   Date Value Ref Range Status   12/30/2021 26 23 - 31 mmol/L Final     Chloride   Date Value Ref Range Status   03/18/2022 105 98 - 107 mmol/L Final   12/30/2021 105 98 - 107 mmol/L Final     Anion Gap   Date Value Ref Range Status   03/18/2022 12.0 5.0 - 15.0 mmol/L Final   12/30/2021 13  Final     Comment:     This test was performed at: [facility],CLIA# [id number],Ruddy Roberson MD, Medical Director,[address] [place] [address]     Creatinine   Date Value Ref Range Status   03/18/2022 0.73 0.57 - 1.00 mg/dL Final   12/30/2021 0.78 0.57 - 1.11 mg/dL Final     BUN   Date Value Ref Range Status   03/18/2022 10 8 - 23 mg/dL Final   12/30/2021 10 8 - 26 mg/dL Final     BUN/Creatinine Ratio   Date Value Ref Range Status   03/18/2022 13.7 7.0 - 25.0 Final     Calcium   Date Value Ref Range Status   03/18/2022 9.7 8.6 - 10.5 mg/dL Final   12/30/2021 10.1 8.4 - 10.5 mg/dL Final     eGFR Non  Amer   Date Value Ref Range Status   02/02/2022 71 >60 mL/min/1.73 Final     Alkaline Phosphatase   Date Value Ref Range Status   03/18/2022 109 39 - 117 U/L Final     Total Protein   Date Value Ref Range Status   03/18/2022 7.1 6.0 - 8.5 g/dL Final     ALT (SGPT)   Date Value Ref Range Status   03/18/2022 8 1 - 33 U/L Final     AST (SGOT)   Date Value Ref Range Status   03/18/2022 13 1 - 32 U/L Final     Total Bilirubin   Date Value Ref Range Status   03/18/2022 0.2 0.0 - 1.2 mg/dL Final     Albumin   Date Value Ref Range Status   03/18/2022 4.70 3.50 - 5.20 g/dL Final     Globulin   Date Value Ref Range Status   03/18/2022 2.4 gm/dL Final     Lab Results   Component Value Date    WBC 10.49 03/18/2022    HGB 14.3 03/18/2022    HCT 42.5 03/18/2022    MCV 89.3 03/18/2022     03/18/2022     Lab Results   Component Value Date    NEUTROABS 6.68  03/18/2022    IRON 44 03/18/2022    IRON 74 11/10/2021    IRON 99 07/07/2021    TIBC 407 03/18/2022    TIBC 346 11/10/2021    TIBC 343 07/07/2021    LABIRON 11 (L) 03/18/2022    LABIRON 21 11/10/2021    LABIRON 29 07/07/2021    FERRITIN 30.86 03/18/2022    FERRITIN 146.20 11/10/2021    FERRITIN 191.90 (H) 07/07/2021    [id number] 926 03/18/2022    OIJRHMPZ73 546 11/10/2021    [id number] 941 07/07/2021    FOLATE >20.00 03/18/2022    FOLATE 8.43 11/10/2021    FOLATE >20.00 07/07/2021     No results found for: , LABCA2, AFPTM, HCGQUANT, , CHROMGRNA, [id number], CEA, REFLABREPO      PATHOLOGY:  * Cannot find OR log *         RADIOLOGY DATA :  No radiology results for the last 7 days        Assessment/Plan     1.  Clear cell cancer of left kidney, stage I, [id number]  -Patient had a partial nephrectomy done on November 16, 2021 at [facility].  -We will continue with clinical surveillance for now  -We will have patient return to clinic in 3 months with repeat low-dose CT of chest without contrast, CT of abdomen and pelvis with contrast repeat CBC, CMP, iron studies, ferritin, B12 and folate to be done prior to that.    2.  Iron deficiency anemia:  -Due to iron deficiency and inability to tolerate iron by mouth, patient received Venofer in May 2021  -Hemoglobin today is is 14.3.  Iron studies are consistent with developing iron deficiency with iron saturation of 11% and ferritin of 30.  Since patient is notably to tolerate iron by mouth, will start patient on intravenous Venofer starting next week.  Side effect of Venofer including allergic reaction were discussed with patient.  -Patient is currently on B12 and folic acid p.o. daily    3.  Folate deficiency:  -Remains on folic acid p.o. daily    4.  Screening for lung cancer:  -Patient is a 46-pack-year smoking history  -We will get low-dose CT of chest without contrast prior to next clinic visit in 3 months.    5. Advance Care Planning: For now patient remains full  code and is able to make decisions.  Patient has health care surrogate mentioned on chart.                      PHQ-9 Total Score: 1   -Patient is not homicidal or suicidal.  No acute intervention required.    Ga Mendoza reports a pain score of 5.  Given her pain assessment as noted, treatment options were discussed and the following options were decided upon as a follow-up plan to address the patient's pain: continuation of current treatment plan for pain.         Po Park MD  3/19/2022  10:44 CDT        Part of this note may be an electronic transcription/translation of spoken language to printed text using the Dragon Dictation System.          CC:

## 2022-03-19 RX ORDER — ACETAMINOPHEN 325 MG/1
650 TABLET ORAL ONCE
Status: CANCELLED | OUTPATIENT
Start: 2022-03-25

## 2022-03-19 RX ORDER — DIPHENHYDRAMINE HCL 25 MG
25 CAPSULE ORAL ONCE
Status: CANCELLED | OUTPATIENT
Start: 2022-03-25

## 2022-03-19 RX ORDER — SODIUM CHLORIDE 9 MG/ML
250 INJECTION, SOLUTION INTRAVENOUS ONCE
Status: CANCELLED | OUTPATIENT
Start: 2022-03-25

## 2022-03-19 RX ORDER — FAMOTIDINE 20 MG/1
20 TABLET, FILM COATED ORAL ONCE
Status: CANCELLED | OUTPATIENT
Start: 2022-03-25

## 2022-03-21 ENCOUNTER — TELEPHONE (OUTPATIENT)
Dept: ONCOLOGY | Facility: CLINIC | Age: 62
End: 2022-03-21

## 2022-03-21 NOTE — TELEPHONE ENCOUNTER
----- Message from Po Park MD sent at 3/19/2022 10:48 AM CDT -----  Regarding: labs  Please let patient know, her iron level is going low in blood.  I have ordered intravenous Venofer to be started next week.  Her B12 and folate level is showing improvement.  Recommend continue with B12 and folic acid p.o. daily.  Thank you

## 2022-03-21 NOTE — TELEPHONE ENCOUNTER
Called and spoke with pt regarding lab results and medication instruction as per Dr. Park, v/u obtained.

## 2022-03-25 RX ORDER — HYDROXYZINE PAMOATE 25 MG/1
CAPSULE ORAL
Qty: 90 CAPSULE | Refills: 0 | Status: SHIPPED | OUTPATIENT
Start: 2022-03-25 | End: 2022-06-20 | Stop reason: SDUPTHER

## 2022-03-29 ENCOUNTER — INFUSION (OUTPATIENT)
Dept: ONCOLOGY | Facility: HOSPITAL | Age: 62
End: 2022-03-29

## 2022-03-29 VITALS
DIASTOLIC BLOOD PRESSURE: 77 MMHG | HEART RATE: 77 BPM | TEMPERATURE: 97 F | SYSTOLIC BLOOD PRESSURE: 158 MMHG | RESPIRATION RATE: 18 BRPM

## 2022-03-29 DIAGNOSIS — T45.4X5A ADVERSE EFFECT OF IRON, INITIAL ENCOUNTER: Primary | ICD-10-CM

## 2022-03-29 DIAGNOSIS — E61.1 IRON DEFICIENCY: ICD-10-CM

## 2022-03-29 PROCEDURE — 63710000001 DIPHENHYDRAMINE PER 50 MG: Performed by: INTERNAL MEDICINE

## 2022-03-29 PROCEDURE — 25010000002 IRON SUCROSE PER 1 MG: Performed by: INTERNAL MEDICINE

## 2022-03-29 PROCEDURE — 96365 THER/PROPH/DIAG IV INF INIT: CPT | Performed by: INTERNAL MEDICINE

## 2022-03-29 RX ORDER — SODIUM CHLORIDE 9 MG/ML
250 INJECTION, SOLUTION INTRAVENOUS ONCE
Status: CANCELLED | OUTPATIENT
Start: 2022-03-31

## 2022-03-29 RX ORDER — DIPHENHYDRAMINE HCL 25 MG
25 CAPSULE ORAL ONCE
Status: CANCELLED | OUTPATIENT
Start: 2022-03-31

## 2022-03-29 RX ORDER — FAMOTIDINE 20 MG/1
20 TABLET, FILM COATED ORAL ONCE
Status: CANCELLED | OUTPATIENT
Start: 2022-03-31

## 2022-03-29 RX ORDER — DIPHENHYDRAMINE HCL 25 MG
25 CAPSULE ORAL ONCE
Status: COMPLETED | OUTPATIENT
Start: 2022-03-29 | End: 2022-03-29

## 2022-03-29 RX ORDER — FAMOTIDINE 20 MG/1
20 TABLET, FILM COATED ORAL ONCE
Status: COMPLETED | OUTPATIENT
Start: 2022-03-29 | End: 2022-03-29

## 2022-03-29 RX ORDER — ACETAMINOPHEN 325 MG/1
650 TABLET ORAL ONCE
Status: COMPLETED | OUTPATIENT
Start: 2022-03-29 | End: 2022-03-29

## 2022-03-29 RX ORDER — ACETAMINOPHEN 325 MG/1
650 TABLET ORAL ONCE
Status: CANCELLED | OUTPATIENT
Start: 2022-03-31

## 2022-03-29 RX ORDER — SODIUM CHLORIDE 9 MG/ML
250 INJECTION, SOLUTION INTRAVENOUS ONCE
Status: COMPLETED | OUTPATIENT
Start: 2022-03-29 | End: 2022-03-29

## 2022-03-29 RX ADMIN — FAMOTIDINE 20 MG: 20 TABLET ORAL at 10:01

## 2022-03-29 RX ADMIN — ACETAMINOPHEN 650 MG: 325 TABLET ORAL at 10:01

## 2022-03-29 RX ADMIN — DIPHENHYDRAMINE HYDROCHLORIDE 25 MG: 25 CAPSULE ORAL at 10:01

## 2022-03-29 RX ADMIN — SODIUM CHLORIDE 250 ML: 9 INJECTION, SOLUTION INTRAVENOUS at 10:08

## 2022-03-29 RX ADMIN — IRON SUCROSE 200 MG: 20 INJECTION, SOLUTION INTRAVENOUS at 10:31

## 2022-03-30 DIAGNOSIS — J44.9 CHRONIC OBSTRUCTIVE PULMONARY DISEASE, UNSPECIFIED COPD TYPE: ICD-10-CM

## 2022-03-30 RX ORDER — BUDESONIDE AND FORMOTEROL FUMARATE DIHYDRATE 160; 4.5 UG/1; UG/1
AEROSOL RESPIRATORY (INHALATION)
Qty: 10.2 G | Refills: 1 | Status: SHIPPED | OUTPATIENT
Start: 2022-03-30 | End: 2022-04-15 | Stop reason: ALTCHOICE

## 2022-03-31 ENCOUNTER — INFUSION (OUTPATIENT)
Dept: ONCOLOGY | Facility: HOSPITAL | Age: 62
End: 2022-03-31

## 2022-03-31 VITALS
DIASTOLIC BLOOD PRESSURE: 77 MMHG | TEMPERATURE: 98.4 F | SYSTOLIC BLOOD PRESSURE: 149 MMHG | RESPIRATION RATE: 16 BRPM | HEART RATE: 79 BPM

## 2022-03-31 DIAGNOSIS — T45.4X5A ADVERSE EFFECT OF IRON, INITIAL ENCOUNTER: Primary | ICD-10-CM

## 2022-03-31 DIAGNOSIS — E61.1 IRON DEFICIENCY: ICD-10-CM

## 2022-03-31 PROCEDURE — 25010000002 IRON SUCROSE PER 1 MG: Performed by: INTERNAL MEDICINE

## 2022-03-31 PROCEDURE — 63710000001 DIPHENHYDRAMINE PER 50 MG: Performed by: INTERNAL MEDICINE

## 2022-03-31 PROCEDURE — 96365 THER/PROPH/DIAG IV INF INIT: CPT | Performed by: INTERNAL MEDICINE

## 2022-03-31 RX ORDER — DIPHENHYDRAMINE HCL 25 MG
25 CAPSULE ORAL ONCE
Status: COMPLETED | OUTPATIENT
Start: 2022-03-31 | End: 2022-03-31

## 2022-03-31 RX ORDER — SODIUM CHLORIDE 9 MG/ML
250 INJECTION, SOLUTION INTRAVENOUS ONCE
Status: CANCELLED | OUTPATIENT
Start: 2022-04-02

## 2022-03-31 RX ORDER — ACETAMINOPHEN 325 MG/1
650 TABLET ORAL ONCE
Status: COMPLETED | OUTPATIENT
Start: 2022-03-31 | End: 2022-03-31

## 2022-03-31 RX ORDER — FAMOTIDINE 20 MG/1
20 TABLET, FILM COATED ORAL ONCE
Status: CANCELLED | OUTPATIENT
Start: 2022-04-02

## 2022-03-31 RX ORDER — DIPHENHYDRAMINE HCL 25 MG
25 CAPSULE ORAL ONCE
Status: CANCELLED | OUTPATIENT
Start: 2022-04-02

## 2022-03-31 RX ORDER — SODIUM CHLORIDE 9 MG/ML
250 INJECTION, SOLUTION INTRAVENOUS ONCE
Status: COMPLETED | OUTPATIENT
Start: 2022-03-31 | End: 2022-03-31

## 2022-03-31 RX ORDER — ACETAMINOPHEN 325 MG/1
650 TABLET ORAL ONCE
Status: CANCELLED | OUTPATIENT
Start: 2022-04-02

## 2022-03-31 RX ORDER — FAMOTIDINE 20 MG/1
20 TABLET, FILM COATED ORAL ONCE
Status: COMPLETED | OUTPATIENT
Start: 2022-03-31 | End: 2022-03-31

## 2022-03-31 RX ADMIN — SODIUM CHLORIDE 250 ML: 9 INJECTION, SOLUTION INTRAVENOUS at 10:32

## 2022-03-31 RX ADMIN — DIPHENHYDRAMINE HYDROCHLORIDE 25 MG: 25 CAPSULE ORAL at 10:32

## 2022-03-31 RX ADMIN — FAMOTIDINE 20 MG: 20 TABLET ORAL at 10:32

## 2022-03-31 RX ADMIN — ACETAMINOPHEN 650 MG: 325 TABLET ORAL at 10:32

## 2022-03-31 RX ADMIN — IRON SUCROSE 200 MG: 20 INJECTION, SOLUTION INTRAVENOUS at 11:02

## 2022-04-04 ENCOUNTER — INFUSION (OUTPATIENT)
Dept: ONCOLOGY | Facility: HOSPITAL | Age: 62
End: 2022-04-04

## 2022-04-04 VITALS
TEMPERATURE: 98.1 F | RESPIRATION RATE: 18 BRPM | SYSTOLIC BLOOD PRESSURE: 145 MMHG | HEART RATE: 83 BPM | DIASTOLIC BLOOD PRESSURE: 78 MMHG

## 2022-04-04 DIAGNOSIS — E61.1 IRON DEFICIENCY: ICD-10-CM

## 2022-04-04 DIAGNOSIS — T45.4X5A ADVERSE EFFECT OF IRON, INITIAL ENCOUNTER: Primary | ICD-10-CM

## 2022-04-04 PROCEDURE — 25010000002 IRON SUCROSE PER 1 MG: Performed by: INTERNAL MEDICINE

## 2022-04-04 PROCEDURE — 63710000001 DIPHENHYDRAMINE PER 50 MG: Performed by: INTERNAL MEDICINE

## 2022-04-04 PROCEDURE — 96365 THER/PROPH/DIAG IV INF INIT: CPT | Performed by: INTERNAL MEDICINE

## 2022-04-04 RX ORDER — SODIUM CHLORIDE 9 MG/ML
250 INJECTION, SOLUTION INTRAVENOUS ONCE
Status: COMPLETED | OUTPATIENT
Start: 2022-04-04 | End: 2022-04-04

## 2022-04-04 RX ORDER — SODIUM CHLORIDE 9 MG/ML
250 INJECTION, SOLUTION INTRAVENOUS ONCE
Status: CANCELLED | OUTPATIENT
Start: 2022-04-06

## 2022-04-04 RX ORDER — DIPHENHYDRAMINE HCL 25 MG
25 CAPSULE ORAL ONCE
Status: CANCELLED | OUTPATIENT
Start: 2022-04-06

## 2022-04-04 RX ORDER — DIPHENHYDRAMINE HCL 25 MG
25 CAPSULE ORAL ONCE
Status: COMPLETED | OUTPATIENT
Start: 2022-04-04 | End: 2022-04-04

## 2022-04-04 RX ORDER — FAMOTIDINE 20 MG/1
20 TABLET, FILM COATED ORAL ONCE
Status: COMPLETED | OUTPATIENT
Start: 2022-04-04 | End: 2022-04-04

## 2022-04-04 RX ORDER — FAMOTIDINE 20 MG/1
20 TABLET, FILM COATED ORAL ONCE
Status: CANCELLED | OUTPATIENT
Start: 2022-04-06

## 2022-04-04 RX ORDER — ACETAMINOPHEN 325 MG/1
650 TABLET ORAL ONCE
Status: CANCELLED | OUTPATIENT
Start: 2022-04-06

## 2022-04-04 RX ORDER — ACETAMINOPHEN 325 MG/1
650 TABLET ORAL ONCE
Status: COMPLETED | OUTPATIENT
Start: 2022-04-04 | End: 2022-04-04

## 2022-04-04 RX ADMIN — IRON SUCROSE 200 MG: 20 INJECTION, SOLUTION INTRAVENOUS at 13:35

## 2022-04-04 RX ADMIN — SODIUM CHLORIDE 250 ML: 9 INJECTION, SOLUTION INTRAVENOUS at 13:35

## 2022-04-04 RX ADMIN — FAMOTIDINE 20 MG: 20 TABLET ORAL at 13:03

## 2022-04-04 RX ADMIN — DIPHENHYDRAMINE HYDROCHLORIDE 25 MG: 25 CAPSULE ORAL at 13:03

## 2022-04-04 RX ADMIN — ACETAMINOPHEN 650 MG: 325 TABLET ORAL at 13:04

## 2022-04-06 ENCOUNTER — INFUSION (OUTPATIENT)
Dept: ONCOLOGY | Facility: HOSPITAL | Age: 62
End: 2022-04-06

## 2022-04-06 VITALS
RESPIRATION RATE: 16 BRPM | SYSTOLIC BLOOD PRESSURE: 131 MMHG | DIASTOLIC BLOOD PRESSURE: 76 MMHG | HEART RATE: 86 BPM | TEMPERATURE: 97.3 F

## 2022-04-06 DIAGNOSIS — E61.1 IRON DEFICIENCY: ICD-10-CM

## 2022-04-06 DIAGNOSIS — T45.4X5A ADVERSE EFFECT OF IRON, INITIAL ENCOUNTER: Primary | ICD-10-CM

## 2022-04-06 PROCEDURE — 96365 THER/PROPH/DIAG IV INF INIT: CPT | Performed by: INTERNAL MEDICINE

## 2022-04-06 PROCEDURE — 63710000001 DIPHENHYDRAMINE PER 50 MG: Performed by: INTERNAL MEDICINE

## 2022-04-06 PROCEDURE — 25010000002 IRON SUCROSE PER 1 MG: Performed by: INTERNAL MEDICINE

## 2022-04-06 RX ORDER — SODIUM CHLORIDE 9 MG/ML
250 INJECTION, SOLUTION INTRAVENOUS ONCE
Status: COMPLETED | OUTPATIENT
Start: 2022-04-06 | End: 2022-04-06

## 2022-04-06 RX ORDER — ACETAMINOPHEN 325 MG/1
650 TABLET ORAL ONCE
Status: COMPLETED | OUTPATIENT
Start: 2022-04-06 | End: 2022-04-06

## 2022-04-06 RX ORDER — DIPHENHYDRAMINE HCL 25 MG
25 CAPSULE ORAL ONCE
Status: CANCELLED | OUTPATIENT
Start: 2022-04-08

## 2022-04-06 RX ORDER — ACETAMINOPHEN 325 MG/1
650 TABLET ORAL ONCE
Status: CANCELLED | OUTPATIENT
Start: 2022-04-08

## 2022-04-06 RX ORDER — DIPHENHYDRAMINE HCL 25 MG
25 CAPSULE ORAL ONCE
Status: COMPLETED | OUTPATIENT
Start: 2022-04-06 | End: 2022-04-06

## 2022-04-06 RX ORDER — FAMOTIDINE 20 MG/1
20 TABLET, FILM COATED ORAL ONCE
Status: COMPLETED | OUTPATIENT
Start: 2022-04-06 | End: 2022-04-06

## 2022-04-06 RX ORDER — FAMOTIDINE 20 MG/1
20 TABLET, FILM COATED ORAL ONCE
Status: CANCELLED | OUTPATIENT
Start: 2022-04-08

## 2022-04-06 RX ORDER — SODIUM CHLORIDE 9 MG/ML
250 INJECTION, SOLUTION INTRAVENOUS ONCE
Status: CANCELLED | OUTPATIENT
Start: 2022-04-08

## 2022-04-06 RX ADMIN — DIPHENHYDRAMINE HYDROCHLORIDE 25 MG: 25 CAPSULE ORAL at 13:57

## 2022-04-06 RX ADMIN — ACETAMINOPHEN 650 MG: 325 TABLET ORAL at 13:56

## 2022-04-06 RX ADMIN — SODIUM CHLORIDE 250 ML: 9 INJECTION, SOLUTION INTRAVENOUS at 14:29

## 2022-04-06 RX ADMIN — FAMOTIDINE 20 MG: 20 TABLET ORAL at 13:56

## 2022-04-06 RX ADMIN — IRON SUCROSE 200 MG: 20 INJECTION, SOLUTION INTRAVENOUS at 14:29

## 2022-04-08 ENCOUNTER — INFUSION (OUTPATIENT)
Dept: ONCOLOGY | Facility: HOSPITAL | Age: 62
End: 2022-04-08

## 2022-04-08 VITALS
TEMPERATURE: 96.6 F | DIASTOLIC BLOOD PRESSURE: 71 MMHG | SYSTOLIC BLOOD PRESSURE: 148 MMHG | HEART RATE: 84 BPM | RESPIRATION RATE: 18 BRPM

## 2022-04-08 DIAGNOSIS — T45.4X5A ADVERSE EFFECT OF IRON, INITIAL ENCOUNTER: Primary | ICD-10-CM

## 2022-04-08 DIAGNOSIS — E61.1 IRON DEFICIENCY: ICD-10-CM

## 2022-04-08 PROCEDURE — 25010000002 IRON SUCROSE PER 1 MG: Performed by: INTERNAL MEDICINE

## 2022-04-08 PROCEDURE — 63710000001 DIPHENHYDRAMINE PER 50 MG: Performed by: INTERNAL MEDICINE

## 2022-04-08 PROCEDURE — 96365 THER/PROPH/DIAG IV INF INIT: CPT | Performed by: INTERNAL MEDICINE

## 2022-04-08 RX ORDER — DIPHENHYDRAMINE HCL 25 MG
25 CAPSULE ORAL ONCE
Status: COMPLETED | OUTPATIENT
Start: 2022-04-08 | End: 2022-04-08

## 2022-04-08 RX ORDER — DIPHENHYDRAMINE HCL 25 MG
25 CAPSULE ORAL ONCE
Status: CANCELLED | OUTPATIENT
Start: 2022-04-08

## 2022-04-08 RX ORDER — ACETAMINOPHEN 325 MG/1
650 TABLET ORAL ONCE
Status: COMPLETED | OUTPATIENT
Start: 2022-04-08 | End: 2022-04-08

## 2022-04-08 RX ORDER — FAMOTIDINE 20 MG/1
20 TABLET, FILM COATED ORAL ONCE
Status: CANCELLED | OUTPATIENT
Start: 2022-04-08

## 2022-04-08 RX ORDER — SODIUM CHLORIDE 9 MG/ML
250 INJECTION, SOLUTION INTRAVENOUS ONCE
Status: COMPLETED | OUTPATIENT
Start: 2022-04-08 | End: 2022-04-08

## 2022-04-08 RX ORDER — ACETAMINOPHEN 325 MG/1
650 TABLET ORAL ONCE
Status: CANCELLED | OUTPATIENT
Start: 2022-04-08

## 2022-04-08 RX ORDER — SODIUM CHLORIDE 9 MG/ML
250 INJECTION, SOLUTION INTRAVENOUS ONCE
Status: CANCELLED | OUTPATIENT
Start: 2022-04-08

## 2022-04-08 RX ORDER — FAMOTIDINE 20 MG/1
20 TABLET, FILM COATED ORAL ONCE
Status: COMPLETED | OUTPATIENT
Start: 2022-04-08 | End: 2022-04-08

## 2022-04-08 RX ADMIN — ACETAMINOPHEN 650 MG: 325 TABLET ORAL at 13:03

## 2022-04-08 RX ADMIN — IRON SUCROSE 200 MG: 20 INJECTION, SOLUTION INTRAVENOUS at 13:33

## 2022-04-08 RX ADMIN — DIPHENHYDRAMINE HYDROCHLORIDE 25 MG: 25 CAPSULE ORAL at 13:03

## 2022-04-08 RX ADMIN — FAMOTIDINE 20 MG: 20 TABLET ORAL at 13:03

## 2022-04-08 RX ADMIN — SODIUM CHLORIDE 250 ML: 9 INJECTION, SOLUTION INTRAVENOUS at 13:03

## 2022-04-15 ENCOUNTER — OFFICE VISIT (OUTPATIENT)
Dept: FAMILY MEDICINE CLINIC | Facility: CLINIC | Age: 62
End: 2022-04-15

## 2022-04-15 VITALS
WEIGHT: 146.8 LBS | SYSTOLIC BLOOD PRESSURE: 136 MMHG | BODY MASS INDEX: 26.01 KG/M2 | RESPIRATION RATE: 18 BRPM | TEMPERATURE: 96.9 F | DIASTOLIC BLOOD PRESSURE: 82 MMHG | OXYGEN SATURATION: 98 % | HEART RATE: 94 BPM | HEIGHT: 63 IN

## 2022-04-15 DIAGNOSIS — D50.0 IRON DEFICIENCY ANEMIA DUE TO CHRONIC BLOOD LOSS: Chronic | ICD-10-CM

## 2022-04-15 DIAGNOSIS — Z91.09 ENVIRONMENTAL ALLERGIES: ICD-10-CM

## 2022-04-15 DIAGNOSIS — J44.9 CHRONIC OBSTRUCTIVE PULMONARY DISEASE, UNSPECIFIED COPD TYPE: ICD-10-CM

## 2022-04-15 DIAGNOSIS — G43.009 MIGRAINE WITHOUT AURA AND WITHOUT STATUS MIGRAINOSUS, NOT INTRACTABLE: ICD-10-CM

## 2022-04-15 DIAGNOSIS — Z00.00 ANNUAL PHYSICAL EXAM: Primary | ICD-10-CM

## 2022-04-15 DIAGNOSIS — I10 PRIMARY HYPERTENSION: ICD-10-CM

## 2022-04-15 PROCEDURE — 99214 OFFICE O/P EST MOD 30 MIN: CPT | Performed by: NURSE PRACTITIONER

## 2022-04-15 RX ORDER — MONTELUKAST SODIUM 10 MG/1
10 TABLET ORAL NIGHTLY
Qty: 30 TABLET | Refills: 3 | Status: SHIPPED | OUTPATIENT
Start: 2022-04-15 | End: 2022-07-12

## 2022-04-15 RX ORDER — LEVOCETIRIZINE DIHYDROCHLORIDE 5 MG/1
5 TABLET, FILM COATED ORAL EVERY EVENING
Qty: 30 TABLET | Refills: 3 | Status: SHIPPED | OUTPATIENT
Start: 2022-04-15 | End: 2022-07-12

## 2022-04-15 RX ORDER — TIOTROPIUM BROMIDE AND OLODATEROL 3.124; 2.736 UG/1; UG/1
1 SPRAY, METERED RESPIRATORY (INHALATION)
Qty: 4 G | Refills: 2 | Status: SHIPPED | OUTPATIENT
Start: 2022-04-15 | End: 2022-07-05 | Stop reason: SDUPTHER

## 2022-04-15 NOTE — PROGRESS NOTES
Subjective   Ga Mendoza is a 61 y.o. female.     CC: Annual follow-up-hypertension, COPD, iron deficiency anemia, migraine headaches, chronic allergies    Hypertension  This is a chronic problem. The current episode started more than 1 year ago. The problem is controlled. Associated symptoms include malaise/fatigue and shortness of breath. Pertinent negatives include no blurred vision, chest pain, headaches or palpitations. Risk factors for coronary artery disease include family history, dyslipidemia, sedentary lifestyle, smoking/tobacco exposure and post-menopausal state. Current antihypertension treatment includes ACE inhibitors and beta blockers. The current treatment provides significant improvement. Compliance problems include diet and exercise.  There is no history of angina, kidney disease or CAD/MI.   COPD  She complains of chest tightness, shortness of breath and wheezing. There is no cough. This is a chronic problem. The current episode started more than 1 year ago. The problem occurs daily. The problem has been waxing and waning. Associated symptoms include dyspnea on exertion, malaise/fatigue and rhinorrhea (chronic). Pertinent negatives include no appetite change, chest pain, fever, headaches, myalgias, sore throat, trouble swallowing or weight loss. Her symptoms are aggravated by URI, minimal activity and change in weather. Her symptoms are alleviated by rest and beta-agonist. She reports significant improvement on treatment. Risk factors for lung disease include smoking/tobacco exposure. Her past medical history is significant for COPD.   Anemia  Presents for follow-up visit. Symptoms include malaise/fatigue. There has been no abdominal pain, anorexia, bruising/bleeding easily, confusion, fever, leg swelling, light-headedness, pallor, palpitations, paresthesias, pica or weight loss. Signs of blood loss that are not present include hematemesis, hematochezia and melena. There are no  compliance problems.  Compliance with medications is %.   Migraine  Headache pattern:  Headache sometimes there, sometimes not at all  Preventative medications tried:  Metoprolol, amitriptyline and topiramate       The following portions of the patient's history were reviewed and updated as appropriate: allergies, current medications, past family history, past medical history, past social history, past surgical history and problem list.    Review of Systems   Constitutional: Positive for malaise/fatigue. Negative for activity change, appetite change, chills, diaphoresis, fatigue, fever, unexpected weight gain and unexpected weight loss.   HENT: Positive for rhinorrhea (chronic). Negative for congestion, sore throat, trouble swallowing and voice change.    Eyes: Positive for discharge (watery eyes). Negative for blurred vision, double vision, photophobia, pain and visual disturbance.   Respiratory: Positive for shortness of breath and wheezing. Negative for cough and chest tightness.    Cardiovascular: Positive for dyspnea on exertion. Negative for chest pain, palpitations and leg swelling.   Gastrointestinal: Negative for abdominal distention, abdominal pain, anal bleeding, anorexia, blood in stool, constipation, diarrhea, hematemesis, hematochezia, melena, nausea, vomiting, GERD and indigestion.   Endocrine: Negative for cold intolerance, heat intolerance, polydipsia, polyphagia and polyuria.   Genitourinary: Negative for dysuria, frequency, hematuria and urgency.   Musculoskeletal: Negative for arthralgias and myalgias.   Skin: Negative for pallor and rash.   Allergic/Immunologic: Negative.    Neurological: Negative for dizziness, syncope, weakness, light-headedness, headache, paresthesias and confusion.   Hematological: Negative.  Does not bruise/bleed easily.   Psychiatric/Behavioral: The patient is not nervous/anxious.        Objective   Physical Exam  Vitals and nursing note reviewed.   Constitutional:        General: She is not in acute distress.     Appearance: Normal appearance. She is well-developed and normal weight. She is not ill-appearing, toxic-appearing or diaphoretic.   HENT:      Head: Normocephalic and atraumatic.      Right Ear: External ear normal.      Left Ear: External ear normal.      Nose: Nose normal.   Eyes:      Conjunctiva/sclera: Conjunctivae normal.      Pupils: Pupils are equal, round, and reactive to light.   Neck:      Thyroid: No thyromegaly.      Trachea: No tracheal deviation.   Cardiovascular:      Rate and Rhythm: Normal rate and regular rhythm.      Heart sounds: Normal heart sounds. No murmur heard.    No friction rub. No gallop.   Pulmonary:      Effort: Pulmonary effort is normal. No respiratory distress.      Breath sounds: No stridor. Examination of the right-lower field reveals decreased breath sounds. Examination of the left-lower field reveals decreased breath sounds. Decreased breath sounds present. No wheezing, rhonchi or rales.   Abdominal:      General: Bowel sounds are normal. There is no distension.      Palpations: Abdomen is soft. There is no mass.      Tenderness: There is no abdominal tenderness. There is no guarding or rebound.      Hernia: No hernia is present.   Musculoskeletal:         General: No tenderness. Normal range of motion.      Cervical back: Normal range of motion and neck supple.   Lymphadenopathy:      Cervical: No cervical adenopathy.   Skin:     General: Skin is warm and dry.      Coloration: Skin is not pale.      Findings: No erythema or rash.   Neurological:      Mental Status: She is alert and oriented to person, place, and time.      Cranial Nerves: No cranial nerve deficit.      Coordination: Coordination normal.   Psychiatric:         Mood and Affect: Mood normal.         Behavior: Behavior normal.         Thought Content: Thought content normal.         Judgment: Judgment normal.           Assessment/Plan   Diagnoses and all orders for  this visit:    1. Annual physical exam (Primary)  -     Lipid Panel; Future  -     Hemoglobin A1c; Future  -     TSH; Future  -     T4, Free; Future, will call with results.    2. Primary hypertension   - Controlled.  Continue lisinopril, Lopressor as prescribed.  We will continue to monitor.    3. Iron deficiency anemia due to chronic blood loss   -Stable.  Continue follow-up with hematology as scheduled.    4. Migraine without aura and without status migrainosus, not intractable   -Stable.  Continue amitriptyline, Lopressor, Topamax as prescribed.  We will continue to monitor.    5. Chronic obstructive pulmonary disease, unspecified COPD type (HCC)  -     tiotropium bromide-olodaterol (Stiolto Respimat) 2.5-2.5 MCG/ACT aerosol solution inhaler; Inhale 1 puff Daily.  Dispense: 4 g; Refill: 2   -Patient reports increase in shortness of breath and wheezing along with increased albuterol usage.  We will discontinue Symbicort and transition to Stiolto 1 puff daily.  Strongly recommended smoking cessation.  We will continue to monitor.    6. Environmental allergies  -     levocetirizine (Xyzal) 5 MG tablet; Take 1 tablet by mouth Every Evening.  Dispense: 30 tablet; Refill: 3  -     montelukast (Singulair) 10 MG tablet; Take 1 tablet by mouth Every Night.  Dispense: 30 tablet; Refill: 3   -Discontinue Zyrtec.  Will transition to Xyzal 5 mg daily and Singulair 10 mg daily.  Patient may continue Pataday eyedrops.  Instructed patient symptoms may persist as long she continues smoking.  We will continue to monitor.    7.  Follow-up in 6 months or sooner for any acute needs.            This document has been electronically signed by ROSEMARY Mccabe on April 15, 2022 16:13 CDT

## 2022-04-19 ENCOUNTER — LAB (OUTPATIENT)
Dept: LAB | Facility: HOSPITAL | Age: 62
End: 2022-04-19

## 2022-04-19 DIAGNOSIS — Z00.00 ANNUAL PHYSICAL EXAM: ICD-10-CM

## 2022-04-19 LAB — HBA1C MFR BLD: 4.9 % (ref 4.8–5.6)

## 2022-04-19 PROCEDURE — 80061 LIPID PANEL: CPT

## 2022-04-19 PROCEDURE — 84439 ASSAY OF FREE THYROXINE: CPT

## 2022-04-19 PROCEDURE — 84443 ASSAY THYROID STIM HORMONE: CPT

## 2022-04-19 PROCEDURE — 83036 HEMOGLOBIN GLYCOSYLATED A1C: CPT

## 2022-04-19 PROCEDURE — 36415 COLL VENOUS BLD VENIPUNCTURE: CPT

## 2022-04-20 DIAGNOSIS — E78.2 MIXED HYPERLIPIDEMIA: Primary | ICD-10-CM

## 2022-04-20 LAB
CHOLEST SERPL-MCNC: 227 MG/DL (ref 0–200)
HDLC SERPL-MCNC: 49 MG/DL (ref 40–60)
LDLC SERPL CALC-MCNC: 157 MG/DL (ref 0–100)
LDLC/HDLC SERPL: 3.15 {RATIO}
T4 FREE SERPL-MCNC: 1.31 NG/DL (ref 0.93–1.7)
TRIGL SERPL-MCNC: 118 MG/DL (ref 0–150)
TSH SERPL DL<=0.05 MIU/L-ACNC: 0.81 UIU/ML (ref 0.27–4.2)
VLDLC SERPL-MCNC: 21 MG/DL (ref 5–40)

## 2022-04-20 RX ORDER — ROSUVASTATIN CALCIUM 40 MG/1
40 TABLET, COATED ORAL DAILY
Qty: 90 TABLET | Refills: 3 | Status: SHIPPED | OUTPATIENT
Start: 2022-04-20

## 2022-04-20 NOTE — PROGRESS NOTES
Lipid levels elevated.  I am increasing Crestor to 40 mg daily.  Prescription sent to mail order pharmacy.  Low-fat diet.  Follow-up as scheduled.

## 2022-04-28 RX ORDER — LISINOPRIL 40 MG/1
TABLET ORAL
Qty: 90 TABLET | Refills: 3 | Status: SHIPPED | OUTPATIENT
Start: 2022-04-28

## 2022-04-28 RX ORDER — ROSUVASTATIN CALCIUM 20 MG/1
TABLET, COATED ORAL
Qty: 90 TABLET | Refills: 3 | OUTPATIENT
Start: 2022-04-28

## 2022-04-28 RX ORDER — VORTIOXETINE 20 MG/1
TABLET, FILM COATED ORAL
Qty: 90 TABLET | Refills: 3 | Status: SHIPPED | OUTPATIENT
Start: 2022-04-28

## 2022-05-02 ENCOUNTER — TELEPHONE (OUTPATIENT)
Dept: FAMILY MEDICINE CLINIC | Facility: CLINIC | Age: 62
End: 2022-05-02

## 2022-05-02 NOTE — TELEPHONE ENCOUNTER
Ms. Mendoza called back stating she is still coughing and running a fever. Has had a neg at home covid test but thinks she may have the flu. She is not wanting to come in due to having fever but would like to have something for the cough called into [facility] in [place]. Please call back @ [phone]

## 2022-05-02 NOTE — TELEPHONE ENCOUNTER
Patient was called to let her know PCP was informed of her symptoms and recommended her to go to Urgent care to be evaluated and/or chest xray.  Patient verbalized understanding.

## 2022-05-03 ENCOUNTER — OFFICE VISIT (OUTPATIENT)
Dept: FAMILY MEDICINE CLINIC | Facility: CLINIC | Age: 62
End: 2022-05-03

## 2022-05-03 VITALS
BODY MASS INDEX: 34.64 KG/M2 | OXYGEN SATURATION: 96 % | HEART RATE: 102 BPM | DIASTOLIC BLOOD PRESSURE: 77 MMHG | SYSTOLIC BLOOD PRESSURE: 132 MMHG | WEIGHT: 195.5 LBS | TEMPERATURE: 100.7 F | HEIGHT: 63 IN

## 2022-05-03 DIAGNOSIS — R06.02 SHORTNESS OF BREATH: ICD-10-CM

## 2022-05-03 DIAGNOSIS — R06.2 WHEEZING: Primary | ICD-10-CM

## 2022-05-03 DIAGNOSIS — R09.89 RALES: ICD-10-CM

## 2022-05-03 DIAGNOSIS — R50.9 FEVER, UNSPECIFIED FEVER CAUSE: ICD-10-CM

## 2022-05-03 LAB
EXPIRATION DATE: NORMAL
FLUAV AG UPPER RESP QL IA.RAPID: NOT DETECTED
FLUBV AG UPPER RESP QL IA.RAPID: NOT DETECTED
INTERNAL CONTROL: NORMAL
Lab: NORMAL
SARS-COV-2 AG UPPER RESP QL IA.RAPID: NOT DETECTED

## 2022-05-03 PROCEDURE — 99213 OFFICE O/P EST LOW 20 MIN: CPT | Performed by: NURSE PRACTITIONER

## 2022-05-03 PROCEDURE — 96372 THER/PROPH/DIAG INJ SC/IM: CPT | Performed by: NURSE PRACTITIONER

## 2022-05-03 PROCEDURE — 87428 SARSCOV & INF VIR A&B AG IA: CPT | Performed by: NURSE PRACTITIONER

## 2022-05-03 RX ORDER — TRIAMCINOLONE ACETONIDE 40 MG/ML
80 INJECTION, SUSPENSION INTRA-ARTICULAR; INTRAMUSCULAR ONCE
Status: COMPLETED | OUTPATIENT
Start: 2022-05-03 | End: 2022-05-03

## 2022-05-03 RX ORDER — AZITHROMYCIN 250 MG/1
TABLET, FILM COATED ORAL
Qty: 6 TABLET | Refills: 0 | Status: SHIPPED | OUTPATIENT
Start: 2022-05-03 | End: 2022-10-01

## 2022-05-03 RX ORDER — CEFUROXIME AXETIL 500 MG/1
500 TABLET ORAL 2 TIMES DAILY
Qty: 20 TABLET | Refills: 0 | Status: SHIPPED | OUTPATIENT
Start: 2022-05-03 | End: 2022-05-13

## 2022-05-03 RX ADMIN — TRIAMCINOLONE ACETONIDE 80 MG: 40 INJECTION, SUSPENSION INTRA-ARTICULAR; INTRAMUSCULAR at 16:05

## 2022-05-03 NOTE — PROGRESS NOTES
Subjective   Ga Mendoza is a 61 y.o. female. Fever    History of Present Illness   Patient presents today for fever. She says symptoms started 1 week ago. Symptoms include: fever, fatigue, chills, cough, shortness of breath. Cough is productive. Pt denies any chest pain, heart palpations, lower extremity. Reports having COVID 19 several months ago. At home she is taking Nyquil and mucinex which have not provided much relief.    The following portions of the patient's history were reviewed and updated as appropriate: allergies, current medications, past family history, past medical history, past social history, past surgical history, and problem list.    Review of Systems   Constitutional: Positive for chills, fatigue and fever.   HENT: Negative for congestion, ear pain, rhinorrhea and sore throat.    Eyes: Negative for blurred vision, double vision and visual disturbance.   Respiratory: Positive for cough and shortness of breath. Negative for chest tightness and wheezing.    Cardiovascular: Negative for chest pain, palpitations and leg swelling.   Gastrointestinal: Negative for abdominal pain, diarrhea, nausea and vomiting.   Endocrine: Negative for cold intolerance and heat intolerance.   Genitourinary: Negative for difficulty urinating, dysuria, frequency and hematuria.   Musculoskeletal: Negative for arthralgias, back pain, neck pain and neck stiffness.   Skin: Negative for dry skin, pallor, rash, skin lesions and wound.   Allergic/Immunologic: Negative for environmental allergies, food allergies and immunocompromised state.   Neurological: Negative for dizziness, syncope, weakness, light-headedness, headache and confusion.   Hematological: Negative for adenopathy. Does not bruise/bleed easily.   Psychiatric/Behavioral: Negative for self-injury, sleep disturbance, suicidal ideas, depressed mood and stress. The patient is not nervous/anxious.        Vitals:    05/03/22 1501   BP: 132/77   Pulse: 102    Temp: (!) 100.7 °F (38.2 °C)   SpO2: 96%     Body mass index is 34.64 kg/m².    Objective   Physical Exam  Vitals and nursing note reviewed.   Constitutional:       General: She is not in acute distress.     Appearance: She is well-developed. She is ill-appearing.   HENT:      Head: Normocephalic.      Right Ear: Hearing, tympanic membrane, ear canal and external ear normal.      Left Ear: Hearing, tympanic membrane, ear canal and external ear normal.      Nose: Nose normal.      Mouth/Throat:      Lips: Pink.      Mouth: Mucous membranes are moist.      Pharynx: Oropharynx is clear. Uvula midline. No oropharyngeal exudate.   Eyes:      General: Lids are normal. Gaze aligned appropriately.      Conjunctiva/sclera: Conjunctivae normal.      Pupils: Pupils are equal, round, and reactive to light.   Neck:      Thyroid: No thyroid mass, thyromegaly or thyroid tenderness.   Cardiovascular:      Rate and Rhythm: Normal rate and regular rhythm.      Heart sounds: Normal heart sounds, S1 normal and S2 normal. No murmur heard.  Pulmonary:      Effort: Pulmonary effort is normal.      Breath sounds: Normal air entry. Examination of the left-upper field reveals wheezing. Examination of the left-middle field reveals wheezing and rales. Examination of the left-lower field reveals wheezing and rales. Wheezing and rales present. No decreased breath sounds or rhonchi.   Abdominal:      General: Abdomen is flat. Bowel sounds are normal.      Palpations: Abdomen is soft.      Tenderness: There is no abdominal tenderness.   Musculoskeletal:         General: Normal range of motion.      Cervical back: Full passive range of motion without pain, normal range of motion and neck supple.   Lymphadenopathy:      Cervical: No cervical adenopathy.   Skin:     General: Skin is warm and dry.   Neurological:      General: No focal deficit present.      Mental Status: She is alert and oriented to person, place, and time.      Coordination:  Coordination is intact.      Gait: Gait is intact.   Psychiatric:         Attention and Perception: Attention normal.         Mood and Affect: Mood normal.         Speech: Speech normal.         Behavior: Behavior normal. Behavior is cooperative.         Thought Content: Thought content normal.         Cognition and Memory: Cognition normal.         Judgment: Judgment normal.           Assessment/Plan   Diagnoses and all orders for this visit:    1. Wheezing (Primary)  -     cefuroxime (CEFTIN) 500 MG tablet; Take 1 tablet by mouth 2 (Two) Times a Day for 10 days.  Dispense: 20 tablet; Refill: 0  -     azithromycin (Zithromax Z-Rehan) 250 MG tablet; Take 2 tablets the first day, then 1 tablet daily for 4 days.  Dispense: 6 tablet; Refill: 0  -     triamcinolone acetonide (KENALOG-40) injection 80 mg    2. Rales    3. Shortness of breath    4. Fever, unspecified fever cause    I suspect PNE.   Pt instructed to take ceftin and z rehan to cover for PNE.  Kenalog 80 mg given IM in office for wheezing.  Pt has nebulizer treatments at home recommended continuing those as directed PRN for SOB.  If SOB does not improve or worsens she understands to go to ER.  If symptoms do not improve or worsen, patient was instructed to return to clinic for further evaluation.         This document has been electronically signed by ROSEMARY Marquez on  May 3, 2022 15:47 CDT

## 2022-05-03 NOTE — PROGRESS NOTES
Injection  Injection performed in  by Daysi Riggs MA. Patient tolerated the procedure well without complications.  05/03/22   Daysi Riggs MA

## 2022-05-16 RX ORDER — FAMOTIDINE 40 MG/1
TABLET, FILM COATED ORAL
Qty: 90 TABLET | Refills: 3 | Status: SHIPPED | OUTPATIENT
Start: 2022-05-16

## 2022-05-16 RX ORDER — ROSUVASTATIN CALCIUM 20 MG/1
TABLET, COATED ORAL
Qty: 90 TABLET | Refills: 3 | OUTPATIENT
Start: 2022-05-16

## 2022-06-02 RX ORDER — ROSUVASTATIN CALCIUM 20 MG/1
TABLET, COATED ORAL
Qty: 90 TABLET | Refills: 3 | OUTPATIENT
Start: 2022-06-02

## 2022-06-02 RX ORDER — FOLIC ACID 1 MG/1
1 TABLET ORAL DAILY
Qty: 90 TABLET | Refills: 1 | Status: SHIPPED | OUTPATIENT
Start: 2022-06-02

## 2022-06-02 NOTE — TELEPHONE ENCOUNTER
Rx Refill Note  Requested Prescriptions     Pending Prescriptions Disp Refills   • folic acid (FOLVITE) 1 MG tablet 90 tablet 1     Sig: Take 1 tablet by mouth Daily.      Last office visit with prescribing clinician: 3/18/2022      Next office visit with prescribing clinician: 6/2/2022            Jazz Todd RN  06/02/22, 10:29 CDT

## 2022-06-02 NOTE — TELEPHONE ENCOUNTER
Incoming Refill Request      Medication requested (name and dose): folic acid 1 mg    Pharmacy where request should be sent: [facility]    Additional details provided by patient: patient is completely out of medication, did not take one last night  Best call back number: [phone]    Does the patient have less than a 3 day supply:  [x] Yes  [] No    Karena Perales  06/02/22, 10:18 CDT

## 2022-06-14 ENCOUNTER — LAB (OUTPATIENT)
Dept: LAB | Facility: HOSPITAL | Age: 62
End: 2022-06-14

## 2022-06-14 ENCOUNTER — HOSPITAL ENCOUNTER (OUTPATIENT)
Dept: CT IMAGING | Facility: HOSPITAL | Age: 62
Discharge: HOME | End: 2022-06-14

## 2022-06-14 DIAGNOSIS — E53.8 FOLATE DEFICIENCY: ICD-10-CM

## 2022-06-14 DIAGNOSIS — C64.2 MALIGNANT NEOPLASM OF LEFT KIDNEY: Chronic | ICD-10-CM

## 2022-06-14 DIAGNOSIS — T45.4X5A ADVERSE EFFECT OF IRON, INITIAL ENCOUNTER: ICD-10-CM

## 2022-06-14 DIAGNOSIS — Z12.2 ENCOUNTER FOR SCREENING FOR LUNG CANCER: ICD-10-CM

## 2022-06-14 DIAGNOSIS — D50.0 IRON DEFICIENCY ANEMIA DUE TO CHRONIC BLOOD LOSS: ICD-10-CM

## 2022-06-14 LAB
ALBUMIN SERPL-MCNC: 4.3 G/DL (ref 3.5–5.2)
ALBUMIN/GLOB SERPL: 1.4 G/DL
ALP SERPL-CCNC: 108 U/L (ref 39–117)
ALT SERPL W P-5'-P-CCNC: 11 U/L (ref 1–33)
ANION GAP SERPL CALCULATED.3IONS-SCNC: 8 MMOL/L (ref 5–15)
AST SERPL-CCNC: 17 U/L (ref 1–32)
BASOPHILS # BLD AUTO: 0.12 10*3/MM3 (ref 0–0.2)
BASOPHILS NFR BLD AUTO: 1.2 % (ref 0–1.5)
BILIRUB SERPL-MCNC: 0.2 MG/DL (ref 0–1.2)
BUN SERPL-MCNC: 12 MG/DL (ref 8–23)
BUN/CREAT SERPL: 15.4 (ref 7–25)
CALCIUM SPEC-SCNC: 9.7 MG/DL (ref 8.6–10.5)
CHLORIDE SERPL-SCNC: 106 MMOL/L (ref 98–107)
CO2 SERPL-SCNC: 27 MMOL/L (ref 22–29)
CREAT SERPL-MCNC: 0.78 MG/DL (ref 0.57–1)
DEPRECATED RDW RBC AUTO: 53.2 FL (ref 37–54)
EGFRCR SERPLBLD CKD-EPI 2021: 87.1 ML/MIN/1.73
EOSINOPHIL # BLD AUTO: 0.09 10*3/MM3 (ref 0–0.4)
EOSINOPHIL NFR BLD AUTO: 0.9 % (ref 0.3–6.2)
ERYTHROCYTE [DISTWIDTH] IN BLOOD BY AUTOMATED COUNT: 15.3 % (ref 12.3–15.4)
FERRITIN SERPL-MCNC: 259.9 NG/ML (ref 13–150)
FOLATE SERPL-MCNC: >20 NG/ML (ref 4.78–24.2)
GLOBULIN UR ELPH-MCNC: 3 GM/DL
GLUCOSE SERPL-MCNC: 91 MG/DL (ref 65–99)
HCT VFR BLD AUTO: 46.1 % (ref 34–46.6)
HGB BLD-MCNC: 15.3 G/DL (ref 12–15.9)
IMM GRANULOCYTES # BLD AUTO: 0.07 10*3/MM3 (ref 0–0.05)
IMM GRANULOCYTES NFR BLD AUTO: 0.7 % (ref 0–0.5)
IRON 24H UR-MRATE: 54 MCG/DL (ref 37–145)
IRON SATN MFR SERPL: 15 % (ref 20–50)
LYMPHOCYTES # BLD AUTO: 2.78 10*3/MM3 (ref 0.7–3.1)
LYMPHOCYTES NFR BLD AUTO: 26.7 % (ref 19.6–45.3)
MCH RBC QN AUTO: 31.4 PG (ref 26.6–33)
MCHC RBC AUTO-ENTMCNC: 33.2 G/DL (ref 31.5–35.7)
MCV RBC AUTO: 94.7 FL (ref 79–97)
MONOCYTES # BLD AUTO: 0.59 10*3/MM3 (ref 0.1–0.9)
MONOCYTES NFR BLD AUTO: 5.7 % (ref 5–12)
NEUTROPHILS NFR BLD AUTO: 6.77 10*3/MM3 (ref 1.7–7)
NEUTROPHILS NFR BLD AUTO: 64.8 % (ref 42.7–76)
NRBC BLD AUTO-RTO: 0 /100 WBC (ref 0–0.2)
PLATELET # BLD AUTO: 323 10*3/MM3 (ref 140–450)
PMV BLD AUTO: 8.7 FL (ref 6–12)
POTASSIUM SERPL-SCNC: 4.6 MMOL/L (ref 3.5–5.2)
PROT SERPL-MCNC: 7.3 G/DL (ref 6–8.5)
RBC # BLD AUTO: 4.87 10*6/MM3 (ref 3.77–5.28)
SODIUM SERPL-SCNC: 141 MMOL/L (ref 136–145)
TIBC SERPL-MCNC: 350 MCG/DL (ref 298–536)
TRANSFERRIN SERPL-MCNC: 235 MG/DL (ref 200–360)
VIT B12 BLD-MCNC: 1494 PG/ML (ref 211–946)
WBC NRBC COR # BLD: 10.42 10*3/MM3 (ref 3.4–10.8)

## 2022-06-14 PROCEDURE — 85025 COMPLETE CBC W/AUTO DIFF WBC: CPT

## 2022-06-14 PROCEDURE — 83540 ASSAY OF IRON: CPT

## 2022-06-14 PROCEDURE — 71271 CT THORAX LUNG CANCER SCR C-: CPT

## 2022-06-14 PROCEDURE — 84466 ASSAY OF TRANSFERRIN: CPT

## 2022-06-14 PROCEDURE — 82746 ASSAY OF FOLIC ACID SERUM: CPT

## 2022-06-14 PROCEDURE — 74177 CT ABD & PELVIS W/CONTRAST: CPT

## 2022-06-14 PROCEDURE — 82728 ASSAY OF FERRITIN: CPT

## 2022-06-14 PROCEDURE — 82607 VITAMIN B-12: CPT

## 2022-06-14 PROCEDURE — 25010000002 IOPAMIDOL 61 % SOLUTION: Performed by: INTERNAL MEDICINE

## 2022-06-14 PROCEDURE — 80053 COMPREHEN METABOLIC PANEL: CPT

## 2022-06-14 RX ADMIN — IOPAMIDOL 90 ML: 612 INJECTION, SOLUTION INTRAVENOUS at 09:14

## 2022-06-17 ENCOUNTER — OFFICE VISIT (OUTPATIENT)
Dept: ONCOLOGY | Facility: CLINIC | Age: 62
End: 2022-06-17

## 2022-06-17 VITALS
DIASTOLIC BLOOD PRESSURE: 83 MMHG | HEART RATE: 93 BPM | WEIGHT: 146.9 LBS | TEMPERATURE: 98.1 F | OXYGEN SATURATION: 98 % | SYSTOLIC BLOOD PRESSURE: 140 MMHG | BODY MASS INDEX: 26.03 KG/M2

## 2022-06-17 DIAGNOSIS — E61.1 IRON DEFICIENCY: Primary | ICD-10-CM

## 2022-06-17 DIAGNOSIS — E53.8 FOLATE DEFICIENCY: ICD-10-CM

## 2022-06-17 DIAGNOSIS — T45.4X5A ADVERSE EFFECT OF IRON, INITIAL ENCOUNTER: ICD-10-CM

## 2022-06-17 DIAGNOSIS — C64.2 MALIGNANT NEOPLASM OF LEFT KIDNEY: ICD-10-CM

## 2022-06-17 DIAGNOSIS — Z12.2 ENCOUNTER FOR SCREENING FOR LUNG CANCER: ICD-10-CM

## 2022-06-17 PROCEDURE — 99214 OFFICE O/P EST MOD 30 MIN: CPT | Performed by: INTERNAL MEDICINE

## 2022-06-17 PROCEDURE — G0463 HOSPITAL OUTPT CLINIC VISIT: HCPCS | Performed by: INTERNAL MEDICINE

## 2022-06-17 NOTE — PROGRESS NOTES
DATE OF VISIT: 6/17/2022      REASON FOR VISIT: Iron deficiency anemia, screening for lung cancer, stage I left  kidney cancer, fatigue      HISTORY OF PRESENT ILLNESS:   61-year-old female with medical problem consisting of hypertension, dyslipidemia, anxiety, bipolar disorder who was initially seen in consultation on September 7, 2021 for iron deficiency anemia folate deficiency and fatigue.  Patient was subsequently diagnosed with stage I left  kidney cancer for which patient had a surgery done at [facility].  Patient is here for follow-up appointment today to discuss recently done blood work as well as CT scan for lung cancer screening and surveillance for kidney cancer.  Continues to have fatigue despite of intravenous iron replacement.  Complains of chronic shortness of breath.  Complains of chronic arthralgia and neuropathy.  Denies any new lymph node enlargement.              Past Medical History, Past Surgical History, Social History, Family History have been reviewed and are without significant changes except as mentioned.    Review of Systems   A comprehensive 14 point review of systems was performed and was negative except as mentioned in HPI.    Medications:  The current medication list was reviewed in the EMR    ALLERGIES:  No Known Allergies    Objective      Vitals:    06/17/22 1149   BP: 140/83   Pulse: 93   Temp: 98.1 °F (36.7 °C)   TempSrc: Temporal   SpO2: 98%   Weight: 66.6 kg (146 lb 14.4 oz)   PainSc:   4   PainLoc: Foot     Current Status 4/8/2022   ECOG score 0       Physical Exam  Pulmonary:      Breath sounds: Normal breath sounds.   Neurological:      Mental Status: She is alert and oriented to person, place, and time.           RECENT LABS:  Glucose   Date Value Ref Range Status   06/14/2022 91 65 - 99 mg/dL Final     Sodium   Date Value Ref Range Status   06/14/2022 141 136 - 145 mmol/L Final   12/30/2021 144 136 - 145 mmol/L Final     Potassium   Date Value Ref Range Status    06/14/2022 4.6 3.5 - 5.2 mmol/L Final   12/30/2021 4.7 3.3 - 4.8 mmol/L Final     Comment:     Plasma reference ranges are shown, please note that serum reference ranges are higher than plasma.     CO2   Date Value Ref Range Status   06/14/2022 27.0 22.0 - 29.0 mmol/L Final     Total CO2   Date Value Ref Range Status   12/30/2021 26 23 - 31 mmol/L Final     Chloride   Date Value Ref Range Status   06/14/2022 106 98 - 107 mmol/L Final   12/30/2021 105 98 - 107 mmol/L Final     Anion Gap   Date Value Ref Range Status   06/14/2022 8.0 5.0 - 15.0 mmol/L Final   12/30/2021 13  Final     Comment:     This test was performed at: [facility],CLIA# [id number],Ruddy Roberson MD, Medical Director,[address] [place] [address]     Creatinine   Date Value Ref Range Status   06/14/2022 0.78 0.57 - 1.00 mg/dL Final   12/30/2021 0.78 0.57 - 1.11 mg/dL Final     BUN   Date Value Ref Range Status   06/14/2022 12 8 - 23 mg/dL Final   12/30/2021 10 8 - 26 mg/dL Final     BUN/Creatinine Ratio   Date Value Ref Range Status   06/14/2022 15.4 7.0 - 25.0 Final     Calcium   Date Value Ref Range Status   06/14/2022 9.7 8.6 - 10.5 mg/dL Final   12/30/2021 10.1 8.4 - 10.5 mg/dL Final     eGFR Non  Amer   Date Value Ref Range Status   02/02/2022 71 >60 mL/min/1.73 Final     Alkaline Phosphatase   Date Value Ref Range Status   06/14/2022 108 39 - 117 U/L Final     Total Protein   Date Value Ref Range Status   06/14/2022 7.3 6.0 - 8.5 g/dL Final     ALT (SGPT)   Date Value Ref Range Status   06/14/2022 11 1 - 33 U/L Final     AST (SGOT)   Date Value Ref Range Status   06/14/2022 17 1 - 32 U/L Final     Total Bilirubin   Date Value Ref Range Status   06/14/2022 0.2 0.0 - 1.2 mg/dL Final     Albumin   Date Value Ref Range Status   06/14/2022 4.30 3.50 - 5.20 g/dL Final     Globulin   Date Value Ref Range Status   06/14/2022 3.0 gm/dL Final     Lab Results   Component Value Date    WBC 10.42 06/14/2022    HGB  15.3 06/14/2022    HCT 46.1 06/14/2022    MCV 94.7 06/14/2022     06/14/2022     Lab Results   Component Value Date    NEUTROABS 6.77 06/14/2022    IRON 54 06/14/2022    IRON 44 03/18/2022    IRON 74 11/10/2021    TIBC 350 06/14/2022    TIBC 407 03/18/2022    TIBC 346 11/10/2021    LABIRON 15 (L) 06/14/2022    LABIRON 11 (L) 03/18/2022    LABIRON 21 11/10/2021    FERRITIN 259.90 (H) 06/14/2022    FERRITIN 30.86 03/18/2022    FERRITIN 146.20 11/10/2021    AMKQSQLH53 1,494 (H) 06/14/2022    YARUBSZO40 926 03/18/2022    EVZQQOOW39 546 11/10/2021    FOLATE >20.00 06/14/2022    FOLATE >20.00 03/18/2022    FOLATE 8.43 11/10/2021     No results found for: , LABCA2, AFPTM, HCGQUANT, , CHROMGRNA, 9DDWS11BAS, CEA, REFLABREPO      PATHOLOGY:  * Cannot find OR log *         RADIOLOGY DATA :  CT Abdomen Pelvis With Contrast    Result Date: 6/15/2022  Stable post surgical change of the left upper kidney with stable postsurgical scarring and no evidence of recurrence or residual neoplastic process is noted. No abnormal adenopathy in the abdomen or pelvis. EXAM: CT LUNG SCREENING ORDERING PROVIDER: MELANIE HUERTA CLINICAL HISTORY: 46 pack-year history of smoking COMPARISON: 7/7/2021 TECHNIQUE: Unenhanced low dose chest CT screening protocol performed and reformatted in the sagittal and coronal planes. This examination was performed according to our departmental dose optimization program which includes automated exposure control, adjustment of the MA and kV according to patient size, and/or use of iterative reconstruction technique. FINDINGS: LUNGS AND PLEURA: Mild centrilobular emphysematous change. Old granulomatous disease. Stable reticular scarring in the left lingular. A focus of groundglass opacity in the left lower lobe posteriorly measuring 1.4 x 2.4 cm in cross-section concerning for possible COVID 19 infection. New focus of reticular scarring in the left lower lobe. No consolidation. No mass. No suspicious  pulmonary nodule. No pleural effusion. No significant pleural plaques. No pneumothorax. No endotracheal or endobronchial lesion. HEART: Normal size. No pericardial effusion. Presence of coronary calcifications. MEDIASTINUM AND [place]: No significant adenopathy. AORTA AND GREAT VESSELS: No aneurysm. No dissection. EXTRATHORACIC SOFT TISSUES: No axillary adenopathy. No mass. Unremarkable supraclavicular soft tissues. UPPER ABDOMEN: Unremarkable. MUSCULOSKELETAL:  No acute fracture. No dislocation. No suspicious lytic or sclerotic lesion. IMPRESSION: No suspicious pulmonary nodule. Mild centrilobular emphysematous change. Old granulomatous disease. Stable reticular scarring in the left lingular. A focus of groundglass opacity in the left lower lobe posteriorly measuring 1.4 x 2.4 cm in cross-section concerning for possible COVID 19 infection. New focus of reticular scarring in the left lower lobe. Presence of coronary calcifications. LUNG RADS:  Category 1: Negative Electronically signed by:  Jose Chong MD  6/15/2022 7:08 AM CDT Workstation: [id number]    CT Chest Low Dose Cancer Screening WO    Result Date: 6/15/2022  Stable post surgical change of the left upper kidney with stable postsurgical scarring and no evidence of recurrence or residual neoplastic process is noted. No abnormal adenopathy in the abdomen or pelvis. EXAM: CT LUNG SCREENING ORDERING PROVIDER: MELANIE HUERTA CLINICAL HISTORY: 46 pack-year history of smoking COMPARISON: 7/7/2021 TECHNIQUE: Unenhanced low dose chest CT screening protocol performed and reformatted in the sagittal and coronal planes. This examination was performed according to our departmental dose optimization program which includes automated exposure control, adjustment of the MA and kV according to patient size, and/or use of iterative reconstruction technique. FINDINGS: LUNGS AND PLEURA: Mild centrilobular emphysematous change. Old granulomatous disease. Stable reticular scarring in the  left lingular. A focus of groundglass opacity in the left lower lobe posteriorly measuring 1.4 x 2.4 cm in cross-section concerning for possible COVID 19 infection. New focus of reticular scarring in the left lower lobe. No consolidation. No mass. No suspicious pulmonary nodule. No pleural effusion. No significant pleural plaques. No pneumothorax. No endotracheal or endobronchial lesion. HEART: Normal size. No pericardial effusion. Presence of coronary calcifications. MEDIASTINUM AND [place]: No significant adenopathy. AORTA AND GREAT VESSELS: No aneurysm. No dissection. EXTRATHORACIC SOFT TISSUES: No axillary adenopathy. No mass. Unremarkable supraclavicular soft tissues. UPPER ABDOMEN: Unremarkable. MUSCULOSKELETAL:  No acute fracture. No dislocation. No suspicious lytic or sclerotic lesion. IMPRESSION: No suspicious pulmonary nodule. Mild centrilobular emphysematous change. Old granulomatous disease. Stable reticular scarring in the left lingular. A focus of groundglass opacity in the left lower lobe posteriorly measuring 1.4 x 2.4 cm in cross-section concerning for possible COVID 19 infection. New focus of reticular scarring in the left lower lobe. Presence of coronary calcifications. LUNG RADS:  Category 1: Negative Electronically signed by:  Jose Chong MD  6/15/2022 7:08 AM CDT Workstation: [id number]          Assessment & Plan     1.  Clear cell cancer of left kidney, stage I, [id number]  - Had a partial nephrectomy done on November 16, 2021 at [place]  - Recent surveillance CT of abdomen and pelvis with contrast on June 14, 2022 does not show any evidence of recurrence.  - We will continue with clinical surveillance for now  - Patient will return to clinic in 3 months with repeat CBC, CMP, iron studies, ferritin, B12 and folate to be done on that day.  - Next  surveillance CT of abdomen and pelvis with contrast will be done around June 2023    2.  Screening for lung cancer  - Patient is 46-pack-year smoking  history.  - Recent low-dose CT of chest without contrast on June 14, 2022 does not show any suspicious pulmonary nodule.  There is groundglass infiltrate in the right lower lobe which could be related to her recent pneumonia that she had 3 to 4 weeks ago.  - Next surveillance low-dose CT of chest without contrast will be done in June 2023    3.  Iron deficiency anemia  - Due to iron deficiency and inability to tolerate iron by mouth patient received Venofer that completed in April 2022  - Hemoglobin is 15.3.  Iron studies shows elevated ferritin at 230 with iron saturation of 15%.  No need for any intravenous iron replacement at present  - Recommend decreasing B12 and folic acid 3 times a week until next clinic visit    4.  Folate deficiency:  - Recommend decreasing folic acid to 3 times a week    5. Advance Care Planning: For now patient remains full code and is able to make decisions.  Patient has health care surrogate mentioned on chart.    6.  Prescriptions: Patient has enough prescription for B12 and folic acid             PHQ-9 Total Score: 1   -Patient is not homicidal or suicidal.  No acute intervention required.    Ga Guillorylene Reji reports a pain score of 4.  Given her pain assessment as noted, treatment options were discussed and the following options were decided upon as a follow-up plan to address the patient's pain: continuation of current treatment plan for pain.         Po Park MD  6/17/2022  12:35 CDT        Part of this note may be an electronic transcription/translation of spoken language to printed text using the Dragon Dictation System.          CC:

## 2022-06-20 RX ORDER — CYCLOBENZAPRINE HCL 10 MG
10 TABLET ORAL 3 TIMES DAILY PRN
Qty: 90 TABLET | Refills: 5 | Status: SHIPPED | OUTPATIENT
Start: 2022-06-20 | End: 2023-01-03

## 2022-06-20 RX ORDER — HYDROXYZINE PAMOATE 25 MG/1
25 CAPSULE ORAL NIGHTLY
Qty: 90 CAPSULE | Refills: 4 | Status: SHIPPED | OUTPATIENT
Start: 2022-06-20

## 2022-07-04 DIAGNOSIS — J44.9 CHRONIC OBSTRUCTIVE PULMONARY DISEASE, UNSPECIFIED COPD TYPE: ICD-10-CM

## 2022-07-05 RX ORDER — TIOTROPIUM BROMIDE AND OLODATEROL 3.124; 2.736 UG/1; UG/1
1 SPRAY, METERED RESPIRATORY (INHALATION)
Qty: 3 EACH | Refills: 1 | Status: SHIPPED | OUTPATIENT
Start: 2022-07-05 | End: 2022-07-12

## 2022-07-05 RX ORDER — BUDESONIDE AND FORMOTEROL FUMARATE DIHYDRATE 160; 4.5 UG/1; UG/1
AEROSOL RESPIRATORY (INHALATION)
Qty: 10.2 G | Refills: 1 | OUTPATIENT
Start: 2022-07-05

## 2022-07-05 NOTE — TELEPHONE ENCOUNTER
The original prescription was discontinued on 4/15/2022 by Nikunj Lipscomb APRN for the following reason: Alternate therapy. Renewing this prescription may not be appropriate.    Requested med [place] 04/15/2022, New script Stiolto sent 04/15/2022 to [facility]  New Script sent to her mail order pharmacy for current inhaler

## 2022-07-12 DIAGNOSIS — J44.9 CHRONIC OBSTRUCTIVE PULMONARY DISEASE, UNSPECIFIED COPD TYPE: ICD-10-CM

## 2022-07-12 DIAGNOSIS — Z91.09 ENVIRONMENTAL ALLERGIES: ICD-10-CM

## 2022-07-12 RX ORDER — LEVOCETIRIZINE DIHYDROCHLORIDE 5 MG/1
5 TABLET, FILM COATED ORAL EVERY EVENING
Qty: 30 TABLET | Refills: 2 | Status: SHIPPED | OUTPATIENT
Start: 2022-07-12 | End: 2022-10-05

## 2022-07-12 RX ORDER — TIOTROPIUM BROMIDE AND OLODATEROL 3.124; 2.736 UG/1; UG/1
SPRAY, METERED RESPIRATORY (INHALATION)
Qty: 4 G | Refills: 1 | Status: SHIPPED | OUTPATIENT
Start: 2022-07-12

## 2022-07-12 RX ORDER — MONTELUKAST SODIUM 10 MG/1
TABLET ORAL
Qty: 30 TABLET | Refills: 2 | Status: SHIPPED | OUTPATIENT
Start: 2022-07-12 | End: 2022-10-05

## 2022-08-10 RX ORDER — LAMOTRIGINE 200 MG/1
TABLET ORAL
Qty: 90 TABLET | Refills: 1 | Status: SHIPPED | OUTPATIENT
Start: 2022-08-10 | End: 2023-02-01

## 2022-09-30 ENCOUNTER — OFFICE VISIT (OUTPATIENT)
Dept: ONCOLOGY | Facility: CLINIC | Age: 62
End: 2022-09-30

## 2022-09-30 ENCOUNTER — LAB (OUTPATIENT)
Dept: ONCOLOGY | Facility: HOSPITAL | Age: 62
End: 2022-09-30

## 2022-09-30 VITALS
OXYGEN SATURATION: 97 % | HEART RATE: 91 BPM | BODY MASS INDEX: 26.93 KG/M2 | DIASTOLIC BLOOD PRESSURE: 91 MMHG | WEIGHT: 152 LBS | SYSTOLIC BLOOD PRESSURE: 173 MMHG | TEMPERATURE: 97.1 F

## 2022-09-30 DIAGNOSIS — D72.828 OTHER ELEVATED WHITE BLOOD CELL (WBC) COUNT: ICD-10-CM

## 2022-09-30 DIAGNOSIS — C64.2 MALIGNANT NEOPLASM OF LEFT KIDNEY: ICD-10-CM

## 2022-09-30 DIAGNOSIS — E53.8 FOLATE DEFICIENCY: Chronic | ICD-10-CM

## 2022-09-30 DIAGNOSIS — Z12.2 ENCOUNTER FOR SCREENING FOR LUNG CANCER: ICD-10-CM

## 2022-09-30 DIAGNOSIS — E61.1 IRON DEFICIENCY: ICD-10-CM

## 2022-09-30 DIAGNOSIS — D75.1 ERYTHROCYTOSIS: ICD-10-CM

## 2022-09-30 DIAGNOSIS — C64.2 MALIGNANT NEOPLASM OF LEFT KIDNEY: Primary | Chronic | ICD-10-CM

## 2022-09-30 DIAGNOSIS — D50.0 IRON DEFICIENCY ANEMIA DUE TO CHRONIC BLOOD LOSS: Chronic | ICD-10-CM

## 2022-09-30 DIAGNOSIS — T45.4X5A ADVERSE EFFECT OF IRON, INITIAL ENCOUNTER: ICD-10-CM

## 2022-09-30 DIAGNOSIS — Z12.2 ENCOUNTER FOR SCREENING FOR LUNG CANCER: Chronic | ICD-10-CM

## 2022-09-30 DIAGNOSIS — T45.4X5A ADVERSE EFFECT OF IRON, INITIAL ENCOUNTER: Chronic | ICD-10-CM

## 2022-09-30 DIAGNOSIS — E53.8 FOLATE DEFICIENCY: ICD-10-CM

## 2022-09-30 LAB
ALBUMIN SERPL-MCNC: 4.7 G/DL (ref 3.5–5.2)
ALBUMIN/GLOB SERPL: 1.6 G/DL
ALP SERPL-CCNC: 107 U/L (ref 39–117)
ALT SERPL W P-5'-P-CCNC: 10 U/L (ref 1–33)
ANION GAP SERPL CALCULATED.3IONS-SCNC: 9 MMOL/L (ref 5–15)
AST SERPL-CCNC: 15 U/L (ref 1–32)
BASOPHILS # BLD AUTO: 0.1 10*3/MM3 (ref 0–0.2)
BASOPHILS NFR BLD AUTO: 0.8 % (ref 0–1.5)
BILIRUB SERPL-MCNC: 0.2 MG/DL (ref 0–1.2)
BUN SERPL-MCNC: 13 MG/DL (ref 8–23)
BUN/CREAT SERPL: 15.9 (ref 7–25)
CALCIUM SPEC-SCNC: 9.7 MG/DL (ref 8.6–10.5)
CHLORIDE SERPL-SCNC: 107 MMOL/L (ref 98–107)
CO2 SERPL-SCNC: 27 MMOL/L (ref 22–29)
CREAT SERPL-MCNC: 0.82 MG/DL (ref 0.57–1)
DEPRECATED RDW RBC AUTO: 47.6 FL (ref 37–54)
EGFRCR SERPLBLD CKD-EPI 2021: 82 ML/MIN/1.73
EOSINOPHIL # BLD AUTO: 0.15 10*3/MM3 (ref 0–0.4)
EOSINOPHIL NFR BLD AUTO: 1.2 % (ref 0.3–6.2)
ERYTHROCYTE [DISTWIDTH] IN BLOOD BY AUTOMATED COUNT: 13.6 % (ref 12.3–15.4)
FERRITIN SERPL-MCNC: 126.7 NG/ML (ref 13–150)
FOLATE SERPL-MCNC: 15.6 NG/ML (ref 4.78–24.2)
GLOBULIN UR ELPH-MCNC: 2.9 GM/DL
GLUCOSE SERPL-MCNC: 98 MG/DL (ref 65–99)
HCT VFR BLD AUTO: 46.8 % (ref 34–46.6)
HGB BLD-MCNC: 15.1 G/DL (ref 12–15.9)
IMM GRANULOCYTES # BLD AUTO: 0.14 10*3/MM3 (ref 0–0.05)
IMM GRANULOCYTES NFR BLD AUTO: 1.1 % (ref 0–0.5)
IRON 24H UR-MRATE: 40 MCG/DL (ref 37–145)
IRON SATN MFR SERPL: 11 % (ref 20–50)
LYMPHOCYTES # BLD AUTO: 2.88 10*3/MM3 (ref 0.7–3.1)
LYMPHOCYTES NFR BLD AUTO: 22.2 % (ref 19.6–45.3)
MCH RBC QN AUTO: 30.6 PG (ref 26.6–33)
MCHC RBC AUTO-ENTMCNC: 32.3 G/DL (ref 31.5–35.7)
MCV RBC AUTO: 94.9 FL (ref 79–97)
MONOCYTES # BLD AUTO: 0.82 10*3/MM3 (ref 0.1–0.9)
MONOCYTES NFR BLD AUTO: 6.3 % (ref 5–12)
NEUTROPHILS NFR BLD AUTO: 68.4 % (ref 42.7–76)
NEUTROPHILS NFR BLD AUTO: 8.89 10*3/MM3 (ref 1.7–7)
NRBC BLD AUTO-RTO: 0 /100 WBC (ref 0–0.2)
PLATELET # BLD AUTO: 319 10*3/MM3 (ref 140–450)
PMV BLD AUTO: 9 FL (ref 6–12)
POTASSIUM SERPL-SCNC: 4.5 MMOL/L (ref 3.5–5.2)
PROT SERPL-MCNC: 7.6 G/DL (ref 6–8.5)
RBC # BLD AUTO: 4.93 10*6/MM3 (ref 3.77–5.28)
SODIUM SERPL-SCNC: 143 MMOL/L (ref 136–145)
TIBC SERPL-MCNC: 378 MCG/DL (ref 298–536)
TRANSFERRIN SERPL-MCNC: 254 MG/DL (ref 200–360)
VIT B12 BLD-MCNC: 988 PG/ML (ref 211–946)
WBC NRBC COR # BLD: 12.98 10*3/MM3 (ref 3.4–10.8)

## 2022-09-30 PROCEDURE — G0463 HOSPITAL OUTPT CLINIC VISIT: HCPCS | Performed by: INTERNAL MEDICINE

## 2022-09-30 PROCEDURE — 82607 VITAMIN B-12: CPT

## 2022-09-30 PROCEDURE — 82746 ASSAY OF FOLIC ACID SERUM: CPT

## 2022-09-30 PROCEDURE — 82728 ASSAY OF FERRITIN: CPT

## 2022-09-30 PROCEDURE — 80053 COMPREHEN METABOLIC PANEL: CPT

## 2022-09-30 PROCEDURE — 85025 COMPLETE CBC W/AUTO DIFF WBC: CPT

## 2022-09-30 PROCEDURE — 99214 OFFICE O/P EST MOD 30 MIN: CPT | Performed by: INTERNAL MEDICINE

## 2022-09-30 PROCEDURE — 83540 ASSAY OF IRON: CPT

## 2022-09-30 PROCEDURE — 84466 ASSAY OF TRANSFERRIN: CPT

## 2022-09-30 RX ORDER — BUPRENORPHINE 20 UG/H
1 PATCH TRANSDERMAL
COMMUNITY
Start: 2022-06-15

## 2022-10-03 ENCOUNTER — TELEPHONE (OUTPATIENT)
Dept: ONCOLOGY | Facility: HOSPITAL | Age: 62
End: 2022-10-03

## 2022-10-03 NOTE — TELEPHONE ENCOUNTER
Called and spoke with pt regarding lab results and medication instructions as per Dr. Park, v/u obtained.

## 2022-10-05 DIAGNOSIS — Z91.09 ENVIRONMENTAL ALLERGIES: ICD-10-CM

## 2022-10-05 RX ORDER — MONTELUKAST SODIUM 10 MG/1
TABLET ORAL
Qty: 30 TABLET | Refills: 0 | Status: SHIPPED | OUTPATIENT
Start: 2022-10-05 | End: 2022-11-10

## 2022-10-05 RX ORDER — LEVOCETIRIZINE DIHYDROCHLORIDE 5 MG/1
5 TABLET, FILM COATED ORAL EVERY EVENING
Qty: 30 TABLET | Refills: 0 | Status: SHIPPED | OUTPATIENT
Start: 2022-10-05 | End: 2022-11-10

## 2022-10-07 ENCOUNTER — OFFICE VISIT (OUTPATIENT)
Dept: FAMILY MEDICINE CLINIC | Facility: CLINIC | Age: 62
End: 2022-10-07

## 2022-10-07 VITALS
HEART RATE: 78 BPM | HEIGHT: 63 IN | TEMPERATURE: 96.9 F | BODY MASS INDEX: 25.21 KG/M2 | RESPIRATION RATE: 18 BRPM | SYSTOLIC BLOOD PRESSURE: 150 MMHG | OXYGEN SATURATION: 96 % | DIASTOLIC BLOOD PRESSURE: 90 MMHG | WEIGHT: 142.3 LBS

## 2022-10-07 DIAGNOSIS — E78.2 MIXED HYPERLIPIDEMIA: ICD-10-CM

## 2022-10-07 DIAGNOSIS — R42 DIZZINESS: ICD-10-CM

## 2022-10-07 DIAGNOSIS — D50.0 IRON DEFICIENCY ANEMIA DUE TO CHRONIC BLOOD LOSS: ICD-10-CM

## 2022-10-07 DIAGNOSIS — H10.13 ALLERGIC CONJUNCTIVITIS OF BOTH EYES: ICD-10-CM

## 2022-10-07 DIAGNOSIS — K21.9 GASTROESOPHAGEAL REFLUX DISEASE WITHOUT ESOPHAGITIS: ICD-10-CM

## 2022-10-07 DIAGNOSIS — I10 PRIMARY HYPERTENSION: Primary | ICD-10-CM

## 2022-10-07 DIAGNOSIS — J44.9 CHRONIC OBSTRUCTIVE PULMONARY DISEASE, UNSPECIFIED COPD TYPE: ICD-10-CM

## 2022-10-07 PROCEDURE — 99214 OFFICE O/P EST MOD 30 MIN: CPT | Performed by: NURSE PRACTITIONER

## 2022-10-07 RX ORDER — DEXLANSOPRAZOLE 60 MG/1
60 CAPSULE, DELAYED RELEASE ORAL DAILY
Qty: 30 CAPSULE | Refills: 0 | Status: SHIPPED | OUTPATIENT
Start: 2022-10-07 | End: 2022-10-11 | Stop reason: SDUPTHER

## 2022-10-07 RX ORDER — NEDOCROMIL SODIUM 20 MG/ML
1 SOLUTION/ DROPS OPHTHALMIC 2 TIMES DAILY PRN
Qty: 5 ML | Refills: 2 | Status: SHIPPED | OUTPATIENT
Start: 2022-10-07

## 2022-10-07 NOTE — PROGRESS NOTES
Subjective   Ga Mendoza is a 61 y.o. female.     History of Present Illness  CC: Hypertension, hyperlipidemia, chronic anemia, COPD, dizziness, gerd, allergic conjunctivitis.   Hypertension  This is a chronic problem. The current episode started more than 1 year ago. The problem has been waxing and waning since onset. Associated symptoms include malaise/fatigue and shortness of breath (chronic, stable. continues to smoke). Pertinent negatives include no chest pain, headaches or palpitations. Risk factors for coronary artery disease include family history, dyslipidemia, smoking/tobacco exposure, sedentary lifestyle and post-menopausal state. Current antihypertension treatment includes ACE inhibitors and beta blockers. The current treatment provides significant improvement. Compliance problems include exercise and diet.    Hyperlipidemia  This is a chronic problem. The current episode started more than 1 year ago. Recent lipid tests were reviewed and are variable. Factors aggravating her hyperlipidemia include fatty foods. Associated symptoms include shortness of breath (chronic, stable. continues to smoke). Pertinent negatives include no chest pain, focal sensory loss, focal weakness, leg pain or myalgias. Current antihyperlipidemic treatment includes statins and herbal therapy. The current treatment provides moderate improvement of lipids. Compliance problems include adherence to exercise and adherence to diet.  Risk factors for coronary artery disease include family history, dyslipidemia, hypertension, a sedentary lifestyle and post-menopausal.   Anemia  Presents for follow-up visit. Symptoms include malaise/fatigue. There has been no abdominal pain, anorexia, bruising/bleeding easily, confusion, fever, leg swelling, light-headedness, pallor, palpitations, paresthesias, pica or weight loss. Signs of blood loss that are not present include hematemesis, hematochezia, melena, menorrhagia and vaginal  bleeding. There are no compliance problems.  Compliance with medications is %.   COPD  She complains of shortness of breath (chronic, stable. continues to smoke). There is no cough or wheezing. This is a chronic problem. The current episode started more than 1 year ago. The problem occurs intermittently. The problem has been waxing and waning. Associated symptoms include dyspnea on exertion, heartburn and malaise/fatigue. Pertinent negatives include no appetite change, chest pain, fever, headaches, myalgias, orthopnea, sore throat, trouble swallowing or weight loss. Her symptoms are aggravated by URI, climbing stairs, change in weather, minimal activity and exposure to smoke. Her symptoms are alleviated by rest and beta-agonist. She reports moderate improvement on treatment. Risk factors for lung disease include smoking/tobacco exposure. Her past medical history is significant for COPD.   Dizziness  This is a chronic problem. The current episode started more than 1 year ago. The problem occurs intermittently. The problem has been waxing and waning. Pertinent negatives include no abdominal pain, anorexia, arthralgias, chest pain, chills, congestion, coughing, fatigue, fever, headaches, myalgias, nausea, rash, sore throat or vomiting. The symptoms are aggravated by exertion. She has tried rest for the symptoms. The treatment provided moderate relief.   Heartburn  She complains of heartburn. She reports no abdominal pain, no chest pain, no coughing, no nausea, no sore throat or no wheezing. This is a chronic problem. The current episode started more than 1 year ago. The problem occurs frequently. The problem has been unchanged. The heartburn duration is several minutes. The heartburn is of moderate intensity. The heartburn does not wake her from sleep. The heartburn does not limit her activity. The heartburn doesn't change with position. The symptoms are aggravated by certain foods and smoking. Pertinent  negatives include no anemia, fatigue, melena, muscle weakness, orthopnea or weight loss. Risk factors include smoking/tobacco exposure and lack of exercise. She has tried a histamine-2 antagonist and a PPI for the symptoms. The treatment provided moderate relief.        The following portions of the patient's history were reviewed and updated as appropriate: allergies, current medications, past family history, past medical history, past social history, past surgical history and problem list.    Review of Systems   Constitutional: Positive for malaise/fatigue. Negative for activity change, appetite change, chills, fatigue, fever, unexpected weight gain and unexpected weight loss.   HENT: Negative for congestion, sore throat, trouble swallowing and voice change.    Eyes: Negative.    Respiratory: Positive for shortness of breath (chronic, stable. continues to smoke). Negative for cough, chest tightness and wheezing.    Cardiovascular: Positive for dyspnea on exertion. Negative for chest pain, palpitations and leg swelling.   Gastrointestinal: Negative for abdominal pain, anorexia, constipation, diarrhea, hematemesis, hematochezia, melena, nausea and vomiting.   Endocrine: Negative.  Negative for cold intolerance, heat intolerance, polydipsia, polyphagia and polyuria.   Genitourinary: Negative for dysuria, menorrhagia and vaginal bleeding.   Musculoskeletal: Negative for arthralgias, myalgias and muscle weakness.   Skin: Negative for pallor and rash.   Allergic/Immunologic: Negative.    Neurological: Positive for dizziness (chronic, stable). Negative for focal weakness, light-headedness, paresthesias and confusion.   Hematological: Negative.  Does not bruise/bleed easily.   Psychiatric/Behavioral: Negative.        Objective   Physical Exam  Vitals and nursing note reviewed.   Constitutional:       General: She is not in acute distress.     Appearance: Normal appearance. She is well-developed and normal weight. She is  not ill-appearing, toxic-appearing or diaphoretic.   HENT:      Head: Normocephalic and atraumatic.      Right Ear: Tympanic membrane, ear canal and external ear normal. There is no impacted cerumen.      Left Ear: Tympanic membrane, ear canal and external ear normal. There is no impacted cerumen.   Eyes:      Extraocular Movements: Extraocular movements intact.      Conjunctiva/sclera: Conjunctivae normal.      Pupils: Pupils are equal, round, and reactive to light.   Neck:      Vascular: No carotid bruit.   Cardiovascular:      Rate and Rhythm: Normal rate and regular rhythm.      Heart sounds: Normal heart sounds.   Pulmonary:      Effort: Pulmonary effort is normal. No respiratory distress.      Breath sounds: Normal breath sounds. No stridor. No wheezing, rhonchi or rales.   Musculoskeletal:         General: No tenderness. Normal range of motion.      Cervical back: Normal range of motion.   Skin:     General: Skin is warm and dry.      Coloration: Skin is not pale.      Findings: No erythema or rash.   Neurological:      General: No focal deficit present.      Mental Status: She is alert and oriented to person, place, and time. Mental status is at baseline.      Cranial Nerves: No cranial nerve deficit.      Motor: No weakness.      Gait: Gait normal.   Psychiatric:         Mood and Affect: Mood normal.         Behavior: Behavior normal.         Thought Content: Thought content normal.         Judgment: Judgment normal.           Assessment & Plan   Diagnoses and all orders for this visit:    1. Primary hypertension (Primary)   -Controlled.  Continue lisinopril and Lopressor as prescribed.  We will continue to monitor.    2. Mixed hyperlipidemia  -     Lipid Panel; Future, will call with results.  Continue low-fat diet, Crestor as prescribed.  We will continue to monitor.    3. Chronic obstructive pulmonary disease, unspecified COPD type (HCC)   -Stable with no improvement.  Continues to smoke.  Not ready to  quit at this time.  Continue inhalers as prescribed.  We will continue to monitor.    4. Iron deficiency anemia due to chronic blood loss   -Managed by hematology.  We will continue to monitor.    5. Dizziness  -     US Carotid Bilateral; Future, will call with results.  No bruit noted on exam.  Normal neurologic Estuardo today.  Will call with results.    6. Gastroesophageal reflux disease without esophagitis  -     dexlansoprazole (Dexilant) 60 MG capsule; Take 1 capsule by mouth Daily for 30 days.  Dispense: 30 capsule; Refill: 0    7. Allergic conjunctivitis of both eyes  -     nedocromil (Alocril) 2 % ophthalmic solution; Administer 1 drop to both eyes 2 (Two) Times a Day As Needed for Allergies.  Dispense: 5 mL; Refill: 2      8.  Follow-up in 6 months or sooner for any acute needs.            This document has been electronically signed by ROSEMARY Mccabe on October 7, 2022 12:56 CDT

## 2022-10-11 ENCOUNTER — HOSPITAL ENCOUNTER (OUTPATIENT)
Dept: ULTRASOUND IMAGING | Facility: HOSPITAL | Age: 62
Discharge: HOME | End: 2022-10-11
Admitting: NURSE PRACTITIONER

## 2022-10-11 DIAGNOSIS — R42 DIZZINESS: ICD-10-CM

## 2022-10-11 DIAGNOSIS — K21.9 GASTROESOPHAGEAL REFLUX DISEASE WITHOUT ESOPHAGITIS: ICD-10-CM

## 2022-10-11 PROCEDURE — 93880 EXTRACRANIAL BILAT STUDY: CPT

## 2022-10-11 RX ORDER — DEXLANSOPRAZOLE 60 MG/1
60 CAPSULE, DELAYED RELEASE ORAL DAILY
Qty: 30 CAPSULE | Refills: 0 | Status: SHIPPED | OUTPATIENT
Start: 2022-10-11 | End: 2022-11-10

## 2022-10-11 RX ORDER — CROMOLYN SODIUM 40 MG/ML
1 SOLUTION/ DROPS OPHTHALMIC 4 TIMES DAILY
Qty: 30 ML | Refills: 12 | Status: SHIPPED | OUTPATIENT
Start: 2022-10-11

## 2022-10-13 ENCOUNTER — TELEPHONE (OUTPATIENT)
Dept: FAMILY MEDICINE CLINIC | Facility: CLINIC | Age: 62
End: 2022-10-13

## 2022-10-13 NOTE — TELEPHONE ENCOUNTER
[facility] called and said that the Alocril Ophthalmic solution is on back order. I offered to send a note to the provider on call asking if something else could be sent and he said that was not necessary. Chart does show Dr. Rangel sent in a different ophthalmic solution on 10/11/22.  Thanks!

## 2022-10-17 ENCOUNTER — TELEPHONE (OUTPATIENT)
Dept: FAMILY MEDICINE CLINIC | Facility: CLINIC | Age: 62
End: 2022-10-17

## 2022-10-17 NOTE — TELEPHONE ENCOUNTER
Ms. Mendoza called stating that her insurance [employer] notified her that the eye drops that Mikhail prescribed are not covered and she was wanting to know if he will send something else in that would be covered. She is also wanting to get her ultrasound results. Please call back @ [phone]

## 2022-10-18 ENCOUNTER — TELEPHONE (OUTPATIENT)
Dept: FAMILY MEDICINE CLINIC | Facility: CLINIC | Age: 62
End: 2022-10-18

## 2022-10-18 NOTE — TELEPHONE ENCOUNTER
Patient returned call to verify pharmacy.  Patient request eye drop prescription to be sent to mail order pharmacy.  Patient was informed that the prescription was sent on 10/11/22 to mail order pharmacy.

## 2022-10-18 NOTE — TELEPHONE ENCOUNTER
Patient was called; no answer.  A message was left to let her know an eye drop was prescribed on 10/11/22 and sent to mail order pharmacy.  Request for patient to return call if she would like the prescription sent to local pharmacy and which one she preferred.

## 2022-11-03 RX ORDER — AMITRIPTYLINE HYDROCHLORIDE 75 MG/1
TABLET, FILM COATED ORAL
Qty: 90 TABLET | Refills: 2 | Status: SHIPPED | OUTPATIENT
Start: 2022-11-03

## 2022-11-03 RX ORDER — TOPIRAMATE 50 MG/1
TABLET, FILM COATED ORAL
Qty: 90 TABLET | Refills: 2 | Status: SHIPPED | OUTPATIENT
Start: 2022-11-03

## 2022-11-10 DIAGNOSIS — Z91.09 ENVIRONMENTAL ALLERGIES: ICD-10-CM

## 2022-11-10 RX ORDER — LEVOCETIRIZINE DIHYDROCHLORIDE 5 MG/1
TABLET, FILM COATED ORAL
Qty: 30 TABLET | Refills: 0 | Status: SHIPPED | OUTPATIENT
Start: 2022-11-10 | End: 2022-12-05

## 2022-11-10 RX ORDER — MONTELUKAST SODIUM 10 MG/1
TABLET ORAL
Qty: 30 TABLET | Refills: 0 | Status: SHIPPED | OUTPATIENT
Start: 2022-11-10 | End: 2022-12-05

## 2022-12-05 DIAGNOSIS — Z91.09 ENVIRONMENTAL ALLERGIES: ICD-10-CM

## 2022-12-05 RX ORDER — MONTELUKAST SODIUM 10 MG/1
TABLET ORAL
Qty: 30 TABLET | Refills: 0 | Status: SHIPPED | OUTPATIENT
Start: 2022-12-05 | End: 2023-02-03

## 2022-12-05 RX ORDER — LEVOCETIRIZINE DIHYDROCHLORIDE 5 MG/1
TABLET, FILM COATED ORAL
Qty: 30 TABLET | Refills: 0 | Status: SHIPPED | OUTPATIENT
Start: 2022-12-05 | End: 2023-02-03

## 2022-12-30 ENCOUNTER — LAB (OUTPATIENT)
Dept: ONCOLOGY | Facility: HOSPITAL | Age: 62
End: 2022-12-30
Payer: COMMERCIAL

## 2022-12-30 ENCOUNTER — OFFICE VISIT (OUTPATIENT)
Dept: ONCOLOGY | Facility: CLINIC | Age: 62
End: 2022-12-30
Payer: COMMERCIAL

## 2022-12-30 VITALS
DIASTOLIC BLOOD PRESSURE: 97 MMHG | OXYGEN SATURATION: 94 % | TEMPERATURE: 98.1 F | HEART RATE: 88 BPM | BODY MASS INDEX: 27.28 KG/M2 | WEIGHT: 154 LBS | SYSTOLIC BLOOD PRESSURE: 164 MMHG

## 2022-12-30 DIAGNOSIS — T45.4X5A ADVERSE EFFECT OF IRON, INITIAL ENCOUNTER: ICD-10-CM

## 2022-12-30 DIAGNOSIS — D50.0 IRON DEFICIENCY ANEMIA DUE TO CHRONIC BLOOD LOSS: ICD-10-CM

## 2022-12-30 DIAGNOSIS — Z12.2 ENCOUNTER FOR SCREENING FOR LUNG CANCER: ICD-10-CM

## 2022-12-30 DIAGNOSIS — E53.8 FOLATE DEFICIENCY: ICD-10-CM

## 2022-12-30 DIAGNOSIS — C64.2 MALIGNANT NEOPLASM OF LEFT KIDNEY: Primary | Chronic | ICD-10-CM

## 2022-12-30 DIAGNOSIS — D50.0 IRON DEFICIENCY ANEMIA DUE TO CHRONIC BLOOD LOSS: Chronic | ICD-10-CM

## 2022-12-30 DIAGNOSIS — C64.2 MALIGNANT NEOPLASM OF LEFT KIDNEY: ICD-10-CM

## 2022-12-30 DIAGNOSIS — E53.8 FOLATE DEFICIENCY: Chronic | ICD-10-CM

## 2022-12-30 DIAGNOSIS — T45.4X5A ADVERSE EFFECT OF IRON, INITIAL ENCOUNTER: Chronic | ICD-10-CM

## 2022-12-30 DIAGNOSIS — Z12.2 ENCOUNTER FOR SCREENING FOR LUNG CANCER: Chronic | ICD-10-CM

## 2022-12-30 LAB
ALBUMIN SERPL-MCNC: 4.3 G/DL (ref 3.5–5.2)
ALBUMIN/GLOB SERPL: 1.4 G/DL
ALP SERPL-CCNC: 121 U/L (ref 39–117)
ALT SERPL W P-5'-P-CCNC: 13 U/L (ref 1–33)
ANION GAP SERPL CALCULATED.3IONS-SCNC: 10 MMOL/L (ref 5–15)
AST SERPL-CCNC: 18 U/L (ref 1–32)
BASOPHILS # BLD AUTO: 0.09 10*3/MM3 (ref 0–0.2)
BASOPHILS NFR BLD AUTO: 0.9 % (ref 0–1.5)
BILIRUB SERPL-MCNC: 0.3 MG/DL (ref 0–1.2)
BUN SERPL-MCNC: 14 MG/DL (ref 8–23)
BUN/CREAT SERPL: 17.5 (ref 7–25)
CALCIUM SPEC-SCNC: 9.8 MG/DL (ref 8.6–10.5)
CHLORIDE SERPL-SCNC: 105 MMOL/L (ref 98–107)
CO2 SERPL-SCNC: 26 MMOL/L (ref 22–29)
CREAT SERPL-MCNC: 0.8 MG/DL (ref 0.57–1)
DEPRECATED RDW RBC AUTO: 45 FL (ref 37–54)
EGFRCR SERPLBLD CKD-EPI 2021: 83.9 ML/MIN/1.73
EOSINOPHIL # BLD AUTO: 0.16 10*3/MM3 (ref 0–0.4)
EOSINOPHIL NFR BLD AUTO: 1.5 % (ref 0.3–6.2)
ERYTHROCYTE [DISTWIDTH] IN BLOOD BY AUTOMATED COUNT: 13.6 % (ref 12.3–15.4)
FERRITIN SERPL-MCNC: 48.58 NG/ML (ref 13–150)
FOLATE SERPL-MCNC: 14.2 NG/ML (ref 4.78–24.2)
GLOBULIN UR ELPH-MCNC: 3 GM/DL
GLUCOSE SERPL-MCNC: 102 MG/DL (ref 65–99)
HCT VFR BLD AUTO: 44.8 % (ref 34–46.6)
HGB BLD-MCNC: 14.4 G/DL (ref 12–15.9)
IMM GRANULOCYTES # BLD AUTO: 0.06 10*3/MM3 (ref 0–0.05)
IMM GRANULOCYTES NFR BLD AUTO: 0.6 % (ref 0–0.5)
IRON 24H UR-MRATE: 100 MCG/DL (ref 37–145)
IRON SATN MFR SERPL: 23 % (ref 20–50)
LYMPHOCYTES # BLD AUTO: 2.47 10*3/MM3 (ref 0.7–3.1)
LYMPHOCYTES NFR BLD AUTO: 23.9 % (ref 19.6–45.3)
MCH RBC QN AUTO: 29.1 PG (ref 26.6–33)
MCHC RBC AUTO-ENTMCNC: 32.1 G/DL (ref 31.5–35.7)
MCV RBC AUTO: 90.7 FL (ref 79–97)
MONOCYTES # BLD AUTO: 0.67 10*3/MM3 (ref 0.1–0.9)
MONOCYTES NFR BLD AUTO: 6.5 % (ref 5–12)
NEUTROPHILS NFR BLD AUTO: 6.9 10*3/MM3 (ref 1.7–7)
NEUTROPHILS NFR BLD AUTO: 66.6 % (ref 42.7–76)
NRBC BLD AUTO-RTO: 0 /100 WBC (ref 0–0.2)
PLATELET # BLD AUTO: 335 10*3/MM3 (ref 140–450)
PMV BLD AUTO: 9.2 FL (ref 6–12)
POTASSIUM SERPL-SCNC: 4.3 MMOL/L (ref 3.5–5.2)
PROT SERPL-MCNC: 7.3 G/DL (ref 6–8.5)
RBC # BLD AUTO: 4.94 10*6/MM3 (ref 3.77–5.28)
SODIUM SERPL-SCNC: 141 MMOL/L (ref 136–145)
TIBC SERPL-MCNC: 431 MCG/DL (ref 298–536)
TRANSFERRIN SERPL-MCNC: 289 MG/DL (ref 200–360)
VIT B12 BLD-MCNC: 702 PG/ML (ref 211–946)
WBC NRBC COR # BLD: 10.35 10*3/MM3 (ref 3.4–10.8)

## 2022-12-30 PROCEDURE — 85025 COMPLETE CBC W/AUTO DIFF WBC: CPT

## 2022-12-30 PROCEDURE — 82746 ASSAY OF FOLIC ACID SERUM: CPT

## 2022-12-30 PROCEDURE — 99214 OFFICE O/P EST MOD 30 MIN: CPT | Performed by: INTERNAL MEDICINE

## 2022-12-30 PROCEDURE — 80053 COMPREHEN METABOLIC PANEL: CPT

## 2022-12-30 PROCEDURE — 83540 ASSAY OF IRON: CPT

## 2022-12-30 PROCEDURE — 84466 ASSAY OF TRANSFERRIN: CPT

## 2022-12-30 PROCEDURE — G0463 HOSPITAL OUTPT CLINIC VISIT: HCPCS | Performed by: INTERNAL MEDICINE

## 2022-12-30 PROCEDURE — 82728 ASSAY OF FERRITIN: CPT

## 2022-12-30 PROCEDURE — 82607 VITAMIN B-12: CPT

## 2022-12-30 RX ORDER — BUPRENORPHINE 20 UG/H
1 PATCH TRANSDERMAL
COMMUNITY
Start: 2022-11-15 | End: 2022-12-30

## 2022-12-30 NOTE — PROGRESS NOTES
DATE OF VISIT: 12/30/2022      REASON FOR VISIT: Iron deficiency anemia, screening for lung cancer, stage I left kidney cancer, fatigue      HISTORY OF PRESENT ILLNESS:   62-year-old female with medical problems significant for hypertension, dyslipidemia, anxiety, bipolar disorder who was initially seen in consultation on June 27, 2021 for evaluation of iron deficiency, folate deficiency.  Patient was subsequently found to have stage I kidney cancer for which she had a surgery done at [place] in November 2021.  Patient is here for follow-up appointment today.  Complains of fatigue.  Complains of chronic arthralgia and neuropathy.  Denies any new lymph node enlargement.  Denies any bleeding.                  Past Medical History, Past Surgical History, Social History, Family History have been reviewed and are without significant changes except as mentioned.    Review of Systems   A comprehensive 14 point review of systems was performed and was negative except as mentioned in HPI.    Medications:  The current medication list was reviewed in the EMR    ALLERGIES:  No Known Allergies    Objective      Vitals:    12/30/22 1319   BP: 164/97   Pulse: 88   Temp: 98.1 °F (36.7 °C)   TempSrc: Temporal   SpO2: 94%   Weight: 69.9 kg (154 lb)   PainSc:   5   PainLoc: Foot  Comment: bilateral neuropathy     Current Status 4/8/2022   ECOG score 0       Physical Exam  Pulmonary:      Breath sounds: Normal breath sounds.   Neurological:      Mental Status: She is alert and oriented to person, place, and time.           RECENT LABS:  Glucose   Date Value Ref Range Status   12/30/2022 102 (H) 65 - 99 mg/dL Final     Sodium   Date Value Ref Range Status   12/30/2022 141 136 - 145 mmol/L Final   12/30/2021 144 136 - 145 mmol/L Final     Potassium   Date Value Ref Range Status   12/30/2022 4.3 3.5 - 5.2 mmol/L Final   12/30/2021 4.7 3.3 - 4.8 mmol/L Final     Comment:     Plasma reference ranges are shown, please note that serum  reference ranges are higher than plasma.     CO2   Date Value Ref Range Status   12/30/2022 26.0 22.0 - 29.0 mmol/L Final     Total CO2   Date Value Ref Range Status   12/30/2021 26 23 - 31 mmol/L Final     Chloride   Date Value Ref Range Status   12/30/2022 105 98 - 107 mmol/L Final   12/30/2021 105 98 - 107 mmol/L Final     Anion Gap   Date Value Ref Range Status   12/30/2022 10.0 5.0 - 15.0 mmol/L Final   12/30/2021 13  Final     Comment:     This test was performed at: [facility],CLIA# [id number],Ruddy Roberson MD, Medical Director,[address] [place] [address]     Creatinine   Date Value Ref Range Status   12/30/2022 0.80 0.57 - 1.00 mg/dL Final   12/30/2021 0.78 0.57 - 1.11 mg/dL Final     BUN   Date Value Ref Range Status   12/30/2022 14 8 - 23 mg/dL Final   12/30/2021 10 8 - 26 mg/dL Final     BUN/Creatinine Ratio   Date Value Ref Range Status   12/30/2022 17.5 7.0 - 25.0 Final     Calcium   Date Value Ref Range Status   12/30/2022 9.8 8.6 - 10.5 mg/dL Final   12/30/2021 10.1 8.4 - 10.5 mg/dL Final     eGFR Non  Amer   Date Value Ref Range Status   02/02/2022 71 >60 mL/min/1.73 Final     Alkaline Phosphatase   Date Value Ref Range Status   12/30/2022 121 (H) 39 - 117 U/L Final     Total Protein   Date Value Ref Range Status   12/30/2022 7.3 6.0 - 8.5 g/dL Final     ALT (SGPT)   Date Value Ref Range Status   12/30/2022 13 1 - 33 U/L Final     AST (SGOT)   Date Value Ref Range Status   12/30/2022 18 1 - 32 U/L Final     Total Bilirubin   Date Value Ref Range Status   12/30/2022 0.3 0.0 - 1.2 mg/dL Final     Albumin   Date Value Ref Range Status   12/30/2022 4.3 3.5 - 5.2 g/dL Final     Globulin   Date Value Ref Range Status   12/30/2022 3.0 gm/dL Final     Lab Results   Component Value Date    WBC 10.35 12/30/2022    HGB 14.4 12/30/2022    HCT 44.8 12/30/2022    MCV 90.7 12/30/2022     12/30/2022     Lab Results   Component Value Date    NEUTROABS 6.90 12/30/2022     IRON 100 12/30/2022    IRON 40 09/30/2022    IRON 54 06/14/2022    TIBC 431 12/30/2022    TIBC 378 09/30/2022    TIBC 350 06/14/2022    LABIRON 23 12/30/2022    LABIRON 11 (L) 09/30/2022    LABIRON 15 (L) 06/14/2022    FERRITIN 126.70 09/30/2022    FERRITIN 259.90 (H) 06/14/2022    FERRITIN 30.86 03/18/2022    [id number] 988 (H) 09/30/2022    EUQYEORN71 1,494 (H) 06/14/2022    [id number] 926 03/18/2022    FOLATE 15.60 09/30/2022    FOLATE >20.00 06/14/2022    FOLATE >20.00 03/18/2022     No results found for: , LABCA2, AFPTM, HCGQUANT, , CHROMGRNA, [id number], CEA, REFLABREPO      PATHOLOGY:  * Cannot find OR log *         RADIOLOGY DATA :  No radiology results for the last 7 days        Assessment & Plan     1.  Clear-cell cancer of left kidney, stage I, [id number]  - Had a partial nephrectomy done on November 16, 2021 at [place].  - Recent surveillance CT of abdomen and pelvis with contrast on June 14, 2022 did not show any evidence of recurrence.  - We will continue with clinical surveillance.  - Patient will return to clinic in 3 months with repeat CBC, CMP, iron studies, ferritin, B12 and folate.  Neck surveillance CT of abdomen and pelvis with contrast will be done around June 2023    2.  Screening for lung cancer:  - Patient is 46-pack-year smoking history.  -Low-dose CT of chest without contrast done on June 14, 2022 did not show any suspicious pulmonary nodule  - Neck surveillance low-dose CT of chest will be done around June 2023    3.  Iron deficiency:  - Due to iron deficiency and inability to tolerate iron by mouth patient received Venofer in April 2022.  - Anemia work-up done today shows hemoglobin is 14.4.  Iron studies from earlier today are adequate.  No need for any intravenous iron replacement.  - Currently on B12 and folic acid 3 times a week    4.  Folate deficiency:  - Remains on folic acid 3 times a week    5.  Leukocytosis:  - White blood cell count is 10.35  - Likely reactive due  to smoking  - We will monitor with CBC    6.  Erythrocytosis  - Secondary to smoking.  - Hematocrit today is 44.8.  No need for any phlebotomy at present.    7.  Health maintenance: Unfortunately patient continues to smoke.  Has been counseled about smoking cessation    8. Advance Care Planning: For now patient remains full code and is able to make decisions.  Patient has health care surrogate mentioned on chart.                 PHQ-9 Total Score: 2   -Patient is not homicidal or suicidal.  No acute intervention required.    Ga Dubois Reji reports a pain score of 5.  Given her pain assessment as noted, treatment options were discussed and the following options were decided upon as a follow-up plan to address the patient's pain: continuation of current treatment plan for pain.         Po Park MD  12/30/2022  13:28 CST        Part of this note may be an electronic transcription/translation of spoken language to printed text using the Dragon Dictation System.          CC:           0 0 0

## 2023-01-03 ENCOUNTER — TELEPHONE (OUTPATIENT)
Dept: ONCOLOGY | Facility: CLINIC | Age: 63
End: 2023-01-03
Payer: COMMERCIAL

## 2023-01-03 RX ORDER — CYCLOBENZAPRINE HCL 10 MG
TABLET ORAL
Qty: 90 TABLET | Refills: 5 | Status: SHIPPED | OUTPATIENT
Start: 2023-01-03

## 2023-01-03 NOTE — TELEPHONE ENCOUNTER
----- Message from Po Park MD sent at 1/1/2023  1:39 PM CST -----  Please let patient know, her iron studies are adequate.  No need for any intravenous iron replacement at present.  Recommend continuing with B12 and folic acid 3 times a week.  Thank you

## 2023-02-01 RX ORDER — LAMOTRIGINE 200 MG/1
TABLET ORAL
Qty: 90 TABLET | Refills: 1 | Status: SHIPPED | OUTPATIENT
Start: 2023-02-01

## 2023-02-03 DIAGNOSIS — Z91.09 ENVIRONMENTAL ALLERGIES: ICD-10-CM

## 2023-02-03 RX ORDER — LEVOCETIRIZINE DIHYDROCHLORIDE 5 MG/1
TABLET, FILM COATED ORAL
Qty: 30 TABLET | Refills: 0 | Status: SHIPPED | OUTPATIENT
Start: 2023-02-03 | End: 2023-03-06

## 2023-02-03 RX ORDER — MONTELUKAST SODIUM 10 MG/1
TABLET ORAL
Qty: 30 TABLET | Refills: 0 | Status: SHIPPED | OUTPATIENT
Start: 2023-02-03 | End: 2023-03-06

## 2023-02-27 RX ORDER — ROPINIROLE 0.25 MG/1
TABLET, FILM COATED ORAL
Qty: 90 TABLET | Refills: 3 | Status: SHIPPED | OUTPATIENT
Start: 2023-02-27

## 2023-03-06 DIAGNOSIS — Z91.09 ENVIRONMENTAL ALLERGIES: ICD-10-CM

## 2023-03-06 RX ORDER — LEVOCETIRIZINE DIHYDROCHLORIDE 5 MG/1
TABLET, FILM COATED ORAL
Qty: 30 TABLET | Refills: 0 | Status: SHIPPED | OUTPATIENT
Start: 2023-03-06 | End: 2023-04-03

## 2023-03-06 RX ORDER — MONTELUKAST SODIUM 10 MG/1
TABLET ORAL
Qty: 30 TABLET | Refills: 0 | Status: SHIPPED | OUTPATIENT
Start: 2023-03-06 | End: 2023-04-03

## 2023-03-31 ENCOUNTER — OFFICE VISIT (OUTPATIENT)
Dept: ONCOLOGY | Facility: CLINIC | Age: 63
End: 2023-03-31
Payer: COMMERCIAL

## 2023-03-31 ENCOUNTER — LAB (OUTPATIENT)
Dept: ONCOLOGY | Facility: HOSPITAL | Age: 63
End: 2023-03-31
Payer: COMMERCIAL

## 2023-03-31 VITALS
BODY MASS INDEX: 27.99 KG/M2 | WEIGHT: 158 LBS | OXYGEN SATURATION: 94 % | DIASTOLIC BLOOD PRESSURE: 84 MMHG | RESPIRATION RATE: 18 BRPM | SYSTOLIC BLOOD PRESSURE: 147 MMHG | HEART RATE: 85 BPM

## 2023-03-31 DIAGNOSIS — T45.4X5A ADVERSE EFFECT OF IRON, INITIAL ENCOUNTER: ICD-10-CM

## 2023-03-31 DIAGNOSIS — Z12.2 ENCOUNTER FOR SCREENING FOR LUNG CANCER: Chronic | ICD-10-CM

## 2023-03-31 DIAGNOSIS — C64.2 MALIGNANT NEOPLASM OF LEFT KIDNEY: ICD-10-CM

## 2023-03-31 DIAGNOSIS — E53.8 FOLATE DEFICIENCY: ICD-10-CM

## 2023-03-31 DIAGNOSIS — T45.4X5A ADVERSE EFFECT OF IRON, INITIAL ENCOUNTER: Chronic | ICD-10-CM

## 2023-03-31 DIAGNOSIS — D50.0 IRON DEFICIENCY ANEMIA DUE TO CHRONIC BLOOD LOSS: ICD-10-CM

## 2023-03-31 DIAGNOSIS — Z12.2 ENCOUNTER FOR SCREENING FOR LUNG CANCER: ICD-10-CM

## 2023-03-31 DIAGNOSIS — D50.0 IRON DEFICIENCY ANEMIA DUE TO CHRONIC BLOOD LOSS: Chronic | ICD-10-CM

## 2023-03-31 DIAGNOSIS — C64.2 MALIGNANT NEOPLASM OF LEFT KIDNEY: Primary | Chronic | ICD-10-CM

## 2023-03-31 DIAGNOSIS — E53.8 FOLATE DEFICIENCY: Chronic | ICD-10-CM

## 2023-03-31 LAB
ALBUMIN SERPL-MCNC: 4.6 G/DL (ref 3.5–5.2)
ALBUMIN/GLOB SERPL: 1.5 G/DL
ALP SERPL-CCNC: 132 U/L (ref 39–117)
ALT SERPL W P-5'-P-CCNC: 14 U/L (ref 1–33)
ANION GAP SERPL CALCULATED.3IONS-SCNC: 10 MMOL/L (ref 5–15)
AST SERPL-CCNC: 19 U/L (ref 1–32)
BASOPHILS # BLD AUTO: 0.12 10*3/MM3 (ref 0–0.2)
BASOPHILS NFR BLD AUTO: 1 % (ref 0–1.5)
BILIRUB SERPL-MCNC: 0.4 MG/DL (ref 0–1.2)
BUN SERPL-MCNC: 9 MG/DL (ref 8–23)
BUN/CREAT SERPL: 12.3 (ref 7–25)
CALCIUM SPEC-SCNC: 9.9 MG/DL (ref 8.6–10.5)
CHLORIDE SERPL-SCNC: 102 MMOL/L (ref 98–107)
CO2 SERPL-SCNC: 27 MMOL/L (ref 22–29)
CREAT SERPL-MCNC: 0.73 MG/DL (ref 0.57–1)
DEPRECATED RDW RBC AUTO: 47.3 FL (ref 37–54)
EGFRCR SERPLBLD CKD-EPI 2021: 93.7 ML/MIN/1.73
EOSINOPHIL # BLD AUTO: 0.14 10*3/MM3 (ref 0–0.4)
EOSINOPHIL NFR BLD AUTO: 1.2 % (ref 0.3–6.2)
ERYTHROCYTE [DISTWIDTH] IN BLOOD BY AUTOMATED COUNT: 14.2 % (ref 12.3–15.4)
FERRITIN SERPL-MCNC: 48.68 NG/ML (ref 13–150)
FOLATE SERPL-MCNC: 12 NG/ML (ref 4.78–24.2)
GLOBULIN UR ELPH-MCNC: 3 GM/DL
GLUCOSE SERPL-MCNC: 99 MG/DL (ref 65–99)
HCT VFR BLD AUTO: 45.1 % (ref 34–46.6)
HGB BLD-MCNC: 14.6 G/DL (ref 12–15.9)
IMM GRANULOCYTES # BLD AUTO: 0.07 10*3/MM3 (ref 0–0.05)
IMM GRANULOCYTES NFR BLD AUTO: 0.6 % (ref 0–0.5)
IRON 24H UR-MRATE: 90 MCG/DL (ref 37–145)
IRON SATN MFR SERPL: 20 % (ref 20–50)
LYMPHOCYTES # BLD AUTO: 2.82 10*3/MM3 (ref 0.7–3.1)
LYMPHOCYTES NFR BLD AUTO: 24.3 % (ref 19.6–45.3)
MCH RBC QN AUTO: 29.7 PG (ref 26.6–33)
MCHC RBC AUTO-ENTMCNC: 32.4 G/DL (ref 31.5–35.7)
MCV RBC AUTO: 91.7 FL (ref 79–97)
MONOCYTES # BLD AUTO: 0.66 10*3/MM3 (ref 0.1–0.9)
MONOCYTES NFR BLD AUTO: 5.7 % (ref 5–12)
NEUTROPHILS NFR BLD AUTO: 67.2 % (ref 42.7–76)
NEUTROPHILS NFR BLD AUTO: 7.78 10*3/MM3 (ref 1.7–7)
NRBC BLD AUTO-RTO: 0 /100 WBC (ref 0–0.2)
PLATELET # BLD AUTO: 295 10*3/MM3 (ref 140–450)
PMV BLD AUTO: 9.9 FL (ref 6–12)
POTASSIUM SERPL-SCNC: 4 MMOL/L (ref 3.5–5.2)
PROT SERPL-MCNC: 7.6 G/DL (ref 6–8.5)
RBC # BLD AUTO: 4.92 10*6/MM3 (ref 3.77–5.28)
SODIUM SERPL-SCNC: 139 MMOL/L (ref 136–145)
TIBC SERPL-MCNC: 450 MCG/DL (ref 298–536)
TRANSFERRIN SERPL-MCNC: 302 MG/DL (ref 200–360)
VIT B12 BLD-MCNC: 710 PG/ML (ref 211–946)
WBC NRBC COR # BLD: 11.59 10*3/MM3 (ref 3.4–10.8)

## 2023-03-31 PROCEDURE — 99214 OFFICE O/P EST MOD 30 MIN: CPT | Performed by: INTERNAL MEDICINE

## 2023-03-31 PROCEDURE — G0463 HOSPITAL OUTPT CLINIC VISIT: HCPCS | Performed by: INTERNAL MEDICINE

## 2023-03-31 NOTE — PROGRESS NOTES
DATE OF VISIT: 3/31/2023      REASON FOR VISIT: Iron deficiency anemia, screening for lung cancer, stage I left kidney cancer, fatigue      HISTORY OF PRESENT ILLNESS:   62-year-old female with medical problems significant for hypertension, dyslipidemia, anxiety, bipolar disorder who was initially seen in consultation on June 27, 2021 for evaluation of iron deficiency, folate deficiency.  Subsequently patient was found to have stage I left kidney cancer for which she had a partial nephrectomy done at [place] in November 2021.  Patient is here for follow-up appointment today.  Continues to have fatigue.  Complains of chronic arthralgia.  Denies any bleeding.  Denies any new lymph node enlargement.              Past Medical History, Past Surgical History, Social History, Family History have been reviewed and are without significant changes except as mentioned.    Review of Systems   A comprehensive 14 point review of systems was performed and was negative except as mentioned in HPI.    Medications:  The current medication list was reviewed in the EMR    ALLERGIES:  No Known Allergies    Objective      Vitals:    03/31/23 1313   BP: 147/84   Pulse: 85   Resp: 18   SpO2: 94%   Weight: 71.7 kg (158 lb)   PainSc: 0-No pain     Current Status 4/8/2022   ECOG score 0       Physical Exam  Pulmonary:      Breath sounds: Normal breath sounds.   Neurological:      Mental Status: She is alert and oriented to person, place, and time.           RECENT LABS:  Glucose   Date Value Ref Range Status   12/30/2022 102 (H) 65 - 99 mg/dL Final     Sodium   Date Value Ref Range Status   12/30/2022 141 136 - 145 mmol/L Final   12/30/2021 144 136 - 145 mmol/L Final     Potassium   Date Value Ref Range Status   12/30/2022 4.3 3.5 - 5.2 mmol/L Final   12/30/2021 4.7 3.3 - 4.8 mmol/L Final     Comment:     Plasma reference ranges are shown, please note that serum reference ranges are higher than plasma.     CO2   Date Value Ref Range Status    12/30/2022 26.0 22.0 - 29.0 mmol/L Final     Total CO2   Date Value Ref Range Status   12/30/2021 26 23 - 31 mmol/L Final     Chloride   Date Value Ref Range Status   12/30/2022 105 98 - 107 mmol/L Final   12/30/2021 105 98 - 107 mmol/L Final     Anion Gap   Date Value Ref Range Status   12/30/2022 10.0 5.0 - 15.0 mmol/L Final   12/30/2021 13  Final     Comment:     This test was performed at: [facility]# [id number],Ruddy Roberson MD, Medical Director,[address] [place] [address]     Creatinine   Date Value Ref Range Status   12/30/2022 0.80 0.57 - 1.00 mg/dL Final   12/30/2021 0.78 0.57 - 1.11 mg/dL Final     BUN   Date Value Ref Range Status   12/30/2022 14 8 - 23 mg/dL Final   12/30/2021 10 8 - 26 mg/dL Final     BUN/Creatinine Ratio   Date Value Ref Range Status   12/30/2022 17.5 7.0 - 25.0 Final     Calcium   Date Value Ref Range Status   12/30/2022 9.8 8.6 - 10.5 mg/dL Final   12/30/2021 10.1 8.4 - 10.5 mg/dL Final     eGFR Non  Amer   Date Value Ref Range Status   02/02/2022 71 >60 mL/min/1.73 Final     Alkaline Phosphatase   Date Value Ref Range Status   12/30/2022 121 (H) 39 - 117 U/L Final     Total Protein   Date Value Ref Range Status   12/30/2022 7.3 6.0 - 8.5 g/dL Final     ALT (SGPT)   Date Value Ref Range Status   12/30/2022 13 1 - 33 U/L Final     AST (SGOT)   Date Value Ref Range Status   12/30/2022 18 1 - 32 U/L Final     Total Bilirubin   Date Value Ref Range Status   12/30/2022 0.3 0.0 - 1.2 mg/dL Final     Albumin   Date Value Ref Range Status   12/30/2022 4.3 3.5 - 5.2 g/dL Final     Globulin   Date Value Ref Range Status   12/30/2022 3.0 gm/dL Final     Lab Results   Component Value Date    WBC 11.59 (H) 03/31/2023    HGB 14.6 03/31/2023    HCT 45.1 03/31/2023    MCV 91.7 03/31/2023     03/31/2023     Lab Results   Component Value Date    NEUTROABS 7.78 (H) 03/31/2023    IRON 100 12/30/2022    IRON 40 09/30/2022    IRON 54 06/14/2022     TIBC 431 12/30/2022    TIBC 378 09/30/2022    TIBC 350 06/14/2022    LABIRON 23 12/30/2022    LABIRON 11 (L) 09/30/2022    LABIRON 15 (L) 06/14/2022    FERRITIN 48.58 12/30/2022    FERRITIN 126.70 09/30/2022    FERRITIN 259.90 (H) 06/14/2022    [id number] 702 12/30/2022    MHRGKPIB54 988 (H) 09/30/2022    ZUTDNOUN54 1,494 (H) 06/14/2022    FOLATE 14.20 12/30/2022    FOLATE 15.60 09/30/2022    FOLATE >20.00 06/14/2022     No results found for: , LABCA2, AFPTM, HCGQUANT, , CHROMGRNA, [id number], CEA, REFLABREPO      PATHOLOGY:  * Cannot find OR log *         RADIOLOGY DATA :  No radiology results for the last 7 days        Assessment & Plan     1.  Clear-cell cancer of left kidney stage I, [id number]  - Patient had partial nephrectomy on November 16, 2021 2020.  -Continue with clinical surveillance for now  - Patient will return to clinic in 3 months with repeat CBC, CMP, iron studies, ferritin, B12, folate, CT of abdomen and pelvis with contrast to be done prior to that.  - If blood work and CT scan remains normal upon next clinic visit plan is to change clinic appointment to every 4 months which has been discussed with patient today    2.  Screening for lung cancer:  - Patient has cumulative 47-pack-year smoking history  - Low-dose CT of chest without contrast has been ordered for lung cancer screening for June 2023 today.    3.  Iron deficiency:  - Due to inability to tolerate iron by mouth, patient received Venofer in April 2022  - Currently on B12 and folic acid 3 times a week  - Anemia work-up done today shows hemoglobin is 14.6.    4.  Folate deficiency:  - Remains on folic acid 3 times a week    5.  Leukocytosis:  - White blood cell count is 11.56 which is predominantly increase in neutrophil  - Reactive due to smoking  - We will monitor with CBC    6.  Erythrocytosis:  - Reactive due to smoking  - Hematocrit is 45.  No need for any therapeutic phlebotomy at present    7.  Health maintenance: Patient  continues to smoke, has been counseled about smoking cessation    8.  Advance care planning:  - CODE STATUS and resuscitation were discussed with patient today.  Patient remains full code      .                               PHQ-9 Total Score: 0   -Patient is not homicidal or suicidal.  No acute intervention required.    Ga Mendoza reports a pain score of 0.  Given her pain assessment as noted, treatment options were discussed and the following options were decided upon as a follow-up plan to address the patient's pain: continuation of current treatment plan for pain.         Po Park MD  3/31/2023  13:41 CDT        Part of this note may be an electronic transcription/translation of spoken language to printed text using the Dragon Dictation System.          CC:

## 2023-04-01 DIAGNOSIS — Z91.09 ENVIRONMENTAL ALLERGIES: ICD-10-CM

## 2023-04-03 ENCOUNTER — TELEPHONE (OUTPATIENT)
Dept: ONCOLOGY | Facility: HOSPITAL | Age: 63
End: 2023-04-03
Payer: COMMERCIAL

## 2023-04-03 RX ORDER — LEVOCETIRIZINE DIHYDROCHLORIDE 5 MG/1
TABLET, FILM COATED ORAL
Qty: 30 TABLET | Refills: 0 | Status: SHIPPED | OUTPATIENT
Start: 2023-04-03

## 2023-04-03 RX ORDER — MONTELUKAST SODIUM 10 MG/1
TABLET ORAL
Qty: 30 TABLET | Refills: 0 | Status: SHIPPED | OUTPATIENT
Start: 2023-04-03

## 2023-04-03 NOTE — TELEPHONE ENCOUNTER
----- Message from Po Park MD sent at 4/2/2023 10:21 AM CDT -----  Please let patient know, iron level is adequate.  No need for any intravenous iron replacement at present.  Recommend continue with B12 and folic acid 3 times a week.  Thank you

## 2023-04-12 ENCOUNTER — OFFICE VISIT (OUTPATIENT)
Dept: FAMILY MEDICINE CLINIC | Facility: CLINIC | Age: 63
End: 2023-04-12
Payer: COMMERCIAL

## 2023-04-12 VITALS
HEIGHT: 63 IN | SYSTOLIC BLOOD PRESSURE: 142 MMHG | WEIGHT: 156.8 LBS | BODY MASS INDEX: 27.78 KG/M2 | HEART RATE: 78 BPM | DIASTOLIC BLOOD PRESSURE: 84 MMHG | OXYGEN SATURATION: 98 % | TEMPERATURE: 98.7 F

## 2023-04-12 DIAGNOSIS — Z00.00 ANNUAL PHYSICAL EXAM: Primary | ICD-10-CM

## 2023-04-12 DIAGNOSIS — E53.8 FOLATE DEFICIENCY: Chronic | ICD-10-CM

## 2023-04-12 DIAGNOSIS — I10 PRIMARY HYPERTENSION: ICD-10-CM

## 2023-04-12 DIAGNOSIS — D50.0 IRON DEFICIENCY ANEMIA DUE TO CHRONIC BLOOD LOSS: Chronic | ICD-10-CM

## 2023-04-12 DIAGNOSIS — C64.2 MALIGNANT NEOPLASM OF LEFT KIDNEY: Chronic | ICD-10-CM

## 2023-04-12 DIAGNOSIS — J44.9 CHRONIC OBSTRUCTIVE PULMONARY DISEASE, UNSPECIFIED COPD TYPE: ICD-10-CM

## 2023-04-12 RX ORDER — TIOTROPIUM BROMIDE AND OLODATEROL 3.124; 2.736 UG/1; UG/1
1 SPRAY, METERED RESPIRATORY (INHALATION) DAILY
Qty: 4 G | Refills: 11 | Status: SHIPPED | OUTPATIENT
Start: 2023-04-12

## 2023-04-12 RX ORDER — ALBUTEROL SULFATE 90 UG/1
2 AEROSOL, METERED RESPIRATORY (INHALATION) EVERY 4 HOURS PRN
Qty: 18 G | Refills: 5 | Status: SHIPPED | OUTPATIENT
Start: 2023-04-12

## 2023-04-12 NOTE — PROGRESS NOTES
Subjective   Ga Mendoza is a 62 y.o. female.     History of Present Illness  CC: Annual follow-up-hypertension, folate deficiency, iron deficiency anemia, renal cancer, COPD  Hypertension  This is a chronic problem. The current episode started more than 1 year ago. The problem is controlled. Associated symptoms include malaise/fatigue and shortness of breath. Pertinent negatives include no blurred vision, chest pain or palpitations. Risk factors for coronary artery disease include family history, dyslipidemia, sedentary lifestyle and smoking/tobacco exposure. Current antihypertension treatment includes beta blockers and ACE inhibitors. The current treatment provides significant improvement. Compliance problems include exercise and diet.    Anemia  Presents for follow-up visit. Symptoms include malaise/fatigue. There has been no abdominal pain, anorexia, bruising/bleeding easily, confusion, fever, leg swelling, light-headedness, pallor, palpitations, paresthesias, pica or weight loss. Signs of blood loss that are not present include hematemesis, hematochezia, melena and vaginal bleeding. There are no compliance problems.  Compliance with medications is %.   COPD  She complains of shortness of breath. There is no cough or wheezing. This is a chronic problem. The current episode started more than 1 year ago. The problem occurs intermittently. The problem has been waxing and waning. Associated symptoms include malaise/fatigue. Pertinent negatives include no appetite change, chest pain, fever, myalgias, sore throat, trouble swallowing or weight loss. Her symptoms are aggravated by URI, climbing stairs, change in weather and minimal activity. Her symptoms are alleviated by rest and beta-agonist. She reports significant improvement on treatment. Risk factors for lung disease include smoking/tobacco exposure. Her past medical history is significant for COPD.        The following portions of the patient's  history were reviewed and updated as appropriate: allergies, current medications, past family history, past medical history, past social history, past surgical history and problem list.    Review of Systems   Constitutional: Positive for malaise/fatigue. Negative for activity change, appetite change, chills, diaphoresis, fatigue, fever, unexpected weight gain and unexpected weight loss.   HENT: Negative for congestion, sore throat, trouble swallowing and voice change.    Eyes: Negative for blurred vision, double vision, photophobia, pain and visual disturbance.   Respiratory: Positive for shortness of breath. Negative for cough, chest tightness and wheezing.    Cardiovascular: Negative for chest pain, palpitations and leg swelling.   Gastrointestinal: Negative for abdominal distention, abdominal pain, anal bleeding, anorexia, blood in stool, constipation, diarrhea, hematemesis, hematochezia, melena, nausea, vomiting, GERD and indigestion.   Endocrine: Negative for cold intolerance, heat intolerance, polydipsia, polyphagia and polyuria.   Genitourinary: Negative for dysuria, frequency, hematuria, urgency and vaginal bleeding.   Musculoskeletal: Negative for arthralgias and myalgias.   Skin: Negative for pallor and rash.   Allergic/Immunologic: Negative.    Neurological: Negative for dizziness, syncope, weakness, light-headedness, headache, paresthesias and confusion.   Hematological: Negative.  Does not bruise/bleed easily.   Psychiatric/Behavioral: The patient is not nervous/anxious.        Objective   Physical Exam  Vitals and nursing note reviewed.   Constitutional:       General: She is not in acute distress.     Appearance: Normal appearance. She is well-developed and normal weight. She is not ill-appearing, toxic-appearing or diaphoretic.   HENT:      Head: Normocephalic and atraumatic.      Right Ear: External ear normal.      Left Ear: External ear normal.      Nose: Nose normal.   Eyes:       Conjunctiva/sclera: Conjunctivae normal.      Pupils: Pupils are equal, round, and reactive to light.   Neck:      Thyroid: No thyromegaly.      Trachea: No tracheal deviation.   Cardiovascular:      Rate and Rhythm: Normal rate and regular rhythm.      Heart sounds: Normal heart sounds. No murmur heard.    No friction rub. No gallop.   Pulmonary:      Effort: Pulmonary effort is normal. No respiratory distress.      Breath sounds: Normal breath sounds. No stridor. No wheezing, rhonchi or rales.   Abdominal:      General: Bowel sounds are normal. There is no distension.      Palpations: Abdomen is soft. There is no mass.      Tenderness: There is no abdominal tenderness. There is no guarding or rebound.      Hernia: No hernia is present.   Musculoskeletal:         General: No tenderness. Normal range of motion.      Cervical back: Normal range of motion and neck supple.   Lymphadenopathy:      Cervical: No cervical adenopathy.   Skin:     General: Skin is warm and dry.      Coloration: Skin is not pale.      Findings: No erythema or rash.   Neurological:      Mental Status: She is alert and oriented to person, place, and time.      Cranial Nerves: No cranial nerve deficit.      Coordination: Coordination normal.   Psychiatric:         Mood and Affect: Mood normal.         Behavior: Behavior normal.         Thought Content: Thought content normal.         Judgment: Judgment normal.           Assessment & Plan   Diagnoses and all orders for this visit:    1. Annual physical exam (Primary)  -     Lipid Panel; Future  -     TSH; Future  -     Hemoglobin A1c; Future, will call with results.  Patient declines referral for mammogram today.  Instructed patient to continue self monitoring at home.  We will continue to monitor.    2. Primary hypertension   -Controlled.  Continue Lopressor and lisinopril as prescribed.  Continue to monitor.    3. Folate deficiency   -Managed by hematology.  Labs reviewed.  Follow-up with  hematology as scheduled.    4. Iron deficiency anemia due to chronic blood loss   -Managed by hematology and patient receiving iron infusions.  Follow-up as scheduled.    5. Malignant neoplasm of left kidney   -Monitored by oncology.  Follow-up on file.  Follow-up as scheduled.    6. Chronic obstructive pulmonary disease, unspecified COPD type  -     albuterol sulfate  (90 Base) MCG/ACT inhaler; Inhale 2 puffs Every 4 (Four) Hours As Needed for Wheezing.  Dispense: 18 g; Refill: 5  -     tiotropium bromide-olodaterol (Stiolto Respimat) 2.5-2.5 MCG/ACT aerosol solution inhaler; Inhale 1 puff Daily.  Dispense: 4 g; Refill: 11   -Stable.  Lungs clear on exam today.  Continue Stiolto and albuterol as prescribed.  Smoking cessation strongly recommended.  Patient not ready to quit at this time.  We will continue to monitor.    7.  Follow-up in 6 months or sooner for any acute needs.              This document has been electronically signed by ROSEMARY Mccabe on April 12, 2023 13:16 CDT

## 2023-04-20 RX ORDER — LISINOPRIL 40 MG/1
TABLET ORAL
Qty: 90 TABLET | Refills: 3 | Status: SHIPPED | OUTPATIENT
Start: 2023-04-20

## 2023-04-20 RX ORDER — VORTIOXETINE 20 MG/1
TABLET, FILM COATED ORAL
Qty: 90 TABLET | Refills: 3 | Status: SHIPPED | OUTPATIENT
Start: 2023-04-20

## 2023-04-20 RX ORDER — FAMOTIDINE 40 MG/1
TABLET, FILM COATED ORAL
Qty: 90 TABLET | Refills: 3 | Status: SHIPPED | OUTPATIENT
Start: 2023-04-20

## 2023-04-28 DIAGNOSIS — Z91.09 ENVIRONMENTAL ALLERGIES: ICD-10-CM

## 2023-04-28 RX ORDER — MONTELUKAST SODIUM 10 MG/1
TABLET ORAL
Qty: 30 TABLET | Refills: 0 | Status: SHIPPED | OUTPATIENT
Start: 2023-04-28

## 2023-04-28 RX ORDER — LEVOCETIRIZINE DIHYDROCHLORIDE 5 MG/1
TABLET, FILM COATED ORAL
Qty: 30 TABLET | Refills: 0 | Status: SHIPPED | OUTPATIENT
Start: 2023-04-28

## 2023-05-24 DIAGNOSIS — Z91.09 ENVIRONMENTAL ALLERGIES: ICD-10-CM

## 2023-05-24 RX ORDER — LEVOCETIRIZINE DIHYDROCHLORIDE 5 MG/1
TABLET, FILM COATED ORAL
Qty: 30 TABLET | Refills: 0 | Status: SHIPPED | OUTPATIENT
Start: 2023-05-24

## 2023-05-24 RX ORDER — MONTELUKAST SODIUM 10 MG/1
TABLET ORAL
Qty: 30 TABLET | Refills: 0 | Status: SHIPPED | OUTPATIENT
Start: 2023-05-24

## 2023-06-12 RX ORDER — CYCLOBENZAPRINE HCL 10 MG
TABLET ORAL
Qty: 90 TABLET | Refills: 0 | Status: SHIPPED | OUTPATIENT
Start: 2023-06-12

## 2023-06-12 RX ORDER — HYDROXYZINE PAMOATE 25 MG/1
CAPSULE ORAL
Qty: 90 CAPSULE | Refills: 0 | Status: SHIPPED | OUTPATIENT
Start: 2023-06-12

## 2023-06-12 NOTE — TELEPHONE ENCOUNTER
UPCOMING APPTS  With Family Medicine (ROSEMARY Mccabe)  10/17/2023 at 2:00 PM  LAST OFFICE VISIT - THIS DEPT  4/12/2023 Nikunj Lipscomb APRN

## 2023-07-25 DIAGNOSIS — Z91.09 ENVIRONMENTAL ALLERGIES: ICD-10-CM

## 2023-07-25 RX ORDER — LEVOCETIRIZINE DIHYDROCHLORIDE 5 MG/1
TABLET, FILM COATED ORAL
Qty: 30 TABLET | Refills: 0 | Status: SHIPPED | OUTPATIENT
Start: 2023-07-25

## 2023-07-25 RX ORDER — MONTELUKAST SODIUM 10 MG/1
TABLET ORAL
Qty: 30 TABLET | Refills: 0 | Status: SHIPPED | OUTPATIENT
Start: 2023-07-25

## 2023-08-02 RX ORDER — AMITRIPTYLINE HYDROCHLORIDE 75 MG/1
TABLET ORAL
Qty: 90 TABLET | Refills: 2 | Status: SHIPPED | OUTPATIENT
Start: 2023-08-02

## 2023-08-02 RX ORDER — LAMOTRIGINE 200 MG/1
TABLET ORAL
Qty: 90 TABLET | Refills: 1 | Status: SHIPPED | OUTPATIENT
Start: 2023-08-02

## 2023-08-02 RX ORDER — CYCLOBENZAPRINE HCL 10 MG
TABLET ORAL
Qty: 90 TABLET | Refills: 0 | Status: SHIPPED | OUTPATIENT
Start: 2023-08-02

## 2023-08-02 RX ORDER — TOPIRAMATE 50 MG/1
TABLET, FILM COATED ORAL
Qty: 90 TABLET | Refills: 2 | Status: SHIPPED | OUTPATIENT
Start: 2023-08-02

## 2023-08-22 RX ORDER — HYDROXYZINE PAMOATE 25 MG/1
CAPSULE ORAL
Qty: 90 CAPSULE | Refills: 0 | Status: SHIPPED | OUTPATIENT
Start: 2023-08-22

## 2023-08-25 DIAGNOSIS — Z91.09 ENVIRONMENTAL ALLERGIES: ICD-10-CM

## 2023-08-25 RX ORDER — MONTELUKAST SODIUM 10 MG/1
TABLET ORAL
Qty: 30 TABLET | Refills: 0 | Status: SHIPPED | OUTPATIENT
Start: 2023-08-25

## 2023-08-25 RX ORDER — LEVOCETIRIZINE DIHYDROCHLORIDE 5 MG/1
TABLET, FILM COATED ORAL
Qty: 30 TABLET | Refills: 0 | Status: SHIPPED | OUTPATIENT
Start: 2023-08-25

## 2023-08-28 RX ORDER — CYCLOBENZAPRINE HCL 10 MG
TABLET ORAL
Qty: 90 TABLET | Refills: 0 | Status: SHIPPED | OUTPATIENT
Start: 2023-08-28

## 2023-09-15 DIAGNOSIS — Z91.09 ENVIRONMENTAL ALLERGIES: ICD-10-CM

## 2023-09-15 RX ORDER — LEVOCETIRIZINE DIHYDROCHLORIDE 5 MG/1
5 TABLET, FILM COATED ORAL EVERY EVENING
Qty: 30 TABLET | Refills: 2 | Status: SHIPPED | OUTPATIENT
Start: 2023-09-15

## 2023-09-15 RX ORDER — MONTELUKAST SODIUM 10 MG/1
10 TABLET ORAL EVERY EVENING
Qty: 30 TABLET | Refills: 2 | Status: SHIPPED | OUTPATIENT
Start: 2023-09-15

## 2023-09-26 DIAGNOSIS — Z91.09 ENVIRONMENTAL ALLERGIES: ICD-10-CM

## 2023-09-26 RX ORDER — MONTELUKAST SODIUM 10 MG/1
10 TABLET ORAL EVERY EVENING
Qty: 30 TABLET | Refills: 0 | Status: SHIPPED | OUTPATIENT
Start: 2023-09-26 | End: 2023-09-27 | Stop reason: SDUPTHER

## 2023-09-26 RX ORDER — LEVOCETIRIZINE DIHYDROCHLORIDE 5 MG/1
5 TABLET, FILM COATED ORAL EVERY EVENING
Qty: 30 TABLET | Refills: 0 | Status: SHIPPED | OUTPATIENT
Start: 2023-09-26

## 2023-09-27 DIAGNOSIS — Z91.09 ENVIRONMENTAL ALLERGIES: ICD-10-CM

## 2023-09-27 RX ORDER — MONTELUKAST SODIUM 10 MG/1
10 TABLET ORAL EVERY EVENING
Qty: 30 TABLET | Refills: 0 | Status: SHIPPED | OUTPATIENT
Start: 2023-09-27

## 2024-12-09 ENCOUNTER — HOSPITAL ENCOUNTER (OUTPATIENT)
Dept: GENERAL RADIOLOGY | Facility: HOSPITAL | Age: 64
Discharge: HOME | End: 2024-12-09
Admitting: PHYSICAL MEDICINE & REHABILITATION
Payer: COMMERCIAL

## 2024-12-09 ENCOUNTER — TRANSCRIBE ORDERS (OUTPATIENT)
Dept: ADMINISTRATIVE | Facility: HOSPITAL | Age: 64
End: 2024-12-09
Payer: COMMERCIAL

## 2024-12-09 DIAGNOSIS — M47.814 THORACIC SPONDYLOSIS WITHOUT MYELOPATHY: Primary | ICD-10-CM

## 2024-12-09 DIAGNOSIS — M47.814 THORACIC SPONDYLOSIS WITHOUT MYELOPATHY: ICD-10-CM

## 2024-12-09 DIAGNOSIS — M47.816 LUMBAR SPONDYLOSIS: ICD-10-CM

## 2024-12-09 PROCEDURE — 72120 X-RAY BEND ONLY L-S SPINE: CPT

## 2024-12-09 PROCEDURE — 72072 X-RAY EXAM THORAC SPINE 3VWS: CPT

## 2024-12-09 PROCEDURE — 72114 X-RAY EXAM L-S SPINE BENDING: CPT

## (undated) DEVICE — Device: Brand: DISPOSABLE ELECTROSURGICAL SNARE

## (undated) DEVICE — BITEBLOCK ENDO W/STRAP 60F A/ LF DISP

## (undated) DEVICE — CANN SMPL SOFTECH BIFLO ETCO2 A/M 7FT

## (undated) DEVICE — SINGLE-USE BIOPSY FORCEPS: Brand: RADIAL JAW 4

## (undated) DEVICE — TRAP SXN POLYP QUICKCATCH LF

## (undated) DEVICE — PAD GRND REM POLYHESIVE A/ DISP